# Patient Record
Sex: MALE | Race: BLACK OR AFRICAN AMERICAN | Employment: OTHER | URBAN - METROPOLITAN AREA
[De-identification: names, ages, dates, MRNs, and addresses within clinical notes are randomized per-mention and may not be internally consistent; named-entity substitution may affect disease eponyms.]

---

## 2020-10-30 ENCOUNTER — HOSPITAL ENCOUNTER (EMERGENCY)
Age: 60
Discharge: HOME | End: 2020-10-30
Attending: EMERGENCY MEDICINE

## 2020-10-30 ENCOUNTER — APPOINTMENT (OUTPATIENT)
Dept: CT IMAGING | Age: 60
End: 2020-10-30
Attending: EMERGENCY MEDICINE

## 2020-10-30 VITALS
OXYGEN SATURATION: 98 % | DIASTOLIC BLOOD PRESSURE: 98 MMHG | SYSTOLIC BLOOD PRESSURE: 206 MMHG | BODY MASS INDEX: 35.84 KG/M2 | HEIGHT: 66 IN | WEIGHT: 223 LBS | HEART RATE: 74 BPM | RESPIRATION RATE: 18 BRPM | TEMPERATURE: 98 F

## 2020-10-30 DIAGNOSIS — R10.32 ABDOMINAL PAIN, LLQ (LEFT LOWER QUADRANT): Primary | ICD-10-CM

## 2020-10-30 PROCEDURE — 99283 EMERGENCY DEPT VISIT LOW MDM: CPT

## 2020-10-30 PROCEDURE — 74011250636 HC RX REV CODE- 250/636: Performed by: EMERGENCY MEDICINE

## 2020-10-30 PROCEDURE — 74176 CT ABD & PELVIS W/O CONTRAST: CPT

## 2020-10-30 PROCEDURE — 96374 THER/PROPH/DIAG INJ IV PUSH: CPT

## 2020-10-30 RX ORDER — NAPROXEN 500 MG/1
500 TABLET ORAL
Qty: 20 TAB | Refills: 0 | Status: SHIPPED | OUTPATIENT
Start: 2020-10-30 | End: 2021-07-09

## 2020-10-30 RX ORDER — KETOROLAC TROMETHAMINE 30 MG/ML
30 INJECTION, SOLUTION INTRAMUSCULAR; INTRAVENOUS
Status: COMPLETED | OUTPATIENT
Start: 2020-10-30 | End: 2020-10-30

## 2020-10-30 RX ADMIN — KETOROLAC TROMETHAMINE 30 MG: 30 INJECTION, SOLUTION INTRAMUSCULAR at 23:00

## 2020-10-31 NOTE — ED PROVIDER NOTES
EMERGENCY DEPARTMENT HISTORY AND PHYSICAL EXAM      Date: 10/30/2020  Patient Name: Annmarie Echevarria      History of Presenting Illness     Chief Complaint   Patient presents with    Abdominal Pain       History Provided By: Patient    HPI: Annmarie Echevarria, 61 y.o. male with a past medical history significant Kidney stones presents to the ED with cc of left flank pain that wraps around to the left side of his abdomen. Patient says the pain has been going on for about 24 hours without exacerbating relieving factors. He is treated with over-the-counter remedies without complete relief of his symptoms. He describes the pain is sharp mild to moderate in nature. [facility] and subsequent sent to the emergency room. There are no other complaints, changes, or physical findings at this time. PCP: Luis Romero MD        Past History     Past Medical History:  No past medical history on file. Past Surgical History:  No past surgical history on file. Family History:  No family history on file. Social History:  Social History     Tobacco Use    Smoking status: Current Every Day Smoker    Smokeless tobacco: Never Used   Substance Use Topics    Alcohol use: Not on file    Drug use: Not on file       Allergies:  No Known Allergies      Review of Systems     Review of Systems   Constitutional: Negative. Negative for activity change, appetite change, chills, fatigue, fever and unexpected weight change. HENT: Negative. Negative for congestion, hearing loss, rhinorrhea, sneezing and voice change. Eyes: Negative. Negative for pain and visual disturbance. Respiratory: Negative. Negative for apnea, cough, choking, chest tightness and shortness of breath. Cardiovascular: Negative. Negative for chest pain and palpitations. Gastrointestinal: Positive for abdominal pain.  Negative for abdominal distention, blood in stool, diarrhea, nausea and vomiting. Genitourinary: Positive for flank pain. Negative for difficulty urinating, frequency and urgency. No discharge   Musculoskeletal: Negative for arthralgias, back pain, myalgias and neck stiffness. Skin: Negative. Negative for color change and rash. Allergic/Immunologic: Negative for immunocompromised state. Neurological: Negative. Negative for dizziness, seizures, syncope, speech difficulty, weakness, numbness and headaches. Hematological: Negative for adenopathy. Psychiatric/Behavioral: Negative. Negative for agitation, behavioral problems, dysphoric mood and suicidal ideas. The patient is not nervous/anxious. Physical Exam     Physical Exam  Vitals signs and nursing note reviewed. Constitutional:       General: He is not in acute distress. Appearance: He is well-developed. HENT:      Head: Normocephalic and atraumatic. Nose: Nose normal.      Mouth/Throat:      Mouth: Mucous membranes are moist.      Pharynx: Oropharynx is clear. No oropharyngeal exudate. Eyes:      General:         Right eye: No discharge. Left eye: No discharge. Conjunctiva/sclera: Conjunctivae normal.      Pupils: Pupils are equal, round, and reactive to light. Neck:      Musculoskeletal: Normal range of motion and neck supple. Cardiovascular:      Rate and Rhythm: Normal rate and regular rhythm. Chest Wall: PMI is not displaced. No thrill. Heart sounds: Normal heart sounds. No murmur. No friction rub. No gallop. Pulmonary:      Effort: Pulmonary effort is normal. No respiratory distress. Breath sounds: Normal breath sounds. No wheezing or rales. Chest:      Chest wall: No tenderness. Abdominal:      General: Bowel sounds are normal. There is no distension. Palpations: Abdomen is soft. There is no mass. Tenderness: There is no abdominal tenderness. There is no guarding or rebound. Musculoskeletal: Normal range of motion.    Lymphadenopathy: Cervical: No cervical adenopathy. Skin:     General: Skin is warm and dry. Capillary Refill: Capillary refill takes less than 2 seconds. Findings: No erythema or rash. Neurological:      Mental Status: He is alert and oriented to person, place, and time. Cranial Nerves: No cranial nerve deficit. Coordination: Coordination normal.   Psychiatric:         Mood and Affect: Mood normal.         Behavior: Behavior normal.         Lab and Diagnostic Study Results     Labs -   No results found for this or any previous visit (from the past 12 hour(s)). Radiologic Studies -   [unfilled]  CT Results  (Last 48 hours)               10/30/20 2232  CT ABD PELV WO CONT Final result    Impression:  IMPRESSION: No acute abdominopelvic abnormality. Degenerative changes of the   spine. Neurostimulator device noted. Narrative:  HISTORY:   Abd/flank pain     Dose reduction technique: All CT scans at this facility are performed using dose reduction optimization   technique as appropriate on the exam including the following: Automated exposure   control, adjustment of the MA and/or KV according to patient size of use of   iterative reconstructive technique. .       TECHNIQUE: CT of the abdomen and pelvis without contrast   COMPARISON: None   LIMITATIONS: None       CHEST: No acute airspace process or pleural effusion seen at the lung bases. LIVER: Normal.        GALLBLADDER: Normal.        BILIARY TREE: Normal.           PANCREAS: Normal.            SPLEEN: Normal.           ADRENAL GLANDS: Normal.       KIDNEYS/URETERS/BLADDER: Normal.           RETROPERITONEUM/AORTA: Normal.      BOWEL/MESENTERY: Normal.         APPENDIX: The appendix is not identified with certainty; there are no secondary   changes of inflammation in the right lower quadrant to suggest acute   appendicitis.    PERITONEAL CAVITY: Normal.          REPRODUCTIVE ORGANS: Normal.           BONE/TISSUES: No acute abnormality. Degenerative changes of the spine with facet   arthropathy particularly at L3-S1 levels. Grade 1 anterolisthesis of L4 on L5. Areas of endplate changes and osteophytes throughout the spine also noted. .   Subcutaneous neurostimulator with leads in the posterior thecal space entering   at the T12-L1 interspace       OTHER: None. CXR Results  (Last 48 hours)    None          Medical Decision Making and ED Course   - I am the first and primary provider for this patient. - I reviewed the vital signs, available nursing notes, past medical history, past surgical history, family history and social history. Initial assessment performed. The patients presenting problems have been discussed, and they are in agreement with the care plan formulated and outlined with them. I have encouraged them to ask questions as they arise throughout their visit. Vital Signs-Reviewed the patient's vital signs. Patient Vitals for the past 12 hrs:   Temp Pulse Resp BP SpO2   10/30/20 2126 98 °F (36.7 °C) 72 18 (!) 192/100 100 %     Records Reviewed: Nursing Notes    The patient presents with abdominal pain with a differential diagnosis of abdominal pain, diverticulitis, gastroenteritis and renal Colic    ED Course:       ED Course as of Oct 30 2306   Fri Oct 30, 2020   2203 Reviewed the patient's labs from outside facility. Will treat with analgesic IV fluid and CT scan of the abdomen for diagnostic purposes. [CS]   4396 His pain is improved. [CS]      ED Course User Index  [CS] Toby Bazzi MD         Provider Notes (Medical Decision Making):   Patient is a 80-year-old male comes in with some left-sided abdominal pain that is not palpable on exam and has a negative CT. I have reviewed the labs from outside facility which are none remarkable for the patient being toxic at this point time. Will treat with analgesic, follow-up with his PCP.   Is noted that he did not have blood in any of his urine. [facility]           Consultations:       Consultations: -   NONE      Procedures and [address] Michelle  Procedures         HYPERTENSION COUNSELING: Education was provided to the patient today regarding their hypertension. Patient is made aware of their elevated blood pressure and is instructed to follow up this week with their Primary Care for a recheck. Patient is counseled regarding consequences of chronic, uncontrolled hypertension including kidney disease, heart disease, stroke or even death. Patient states their understanding and agrees to follow up this week. Additionally, during their visit, I discussed sodium restriction, maintaining ideal body weight and regular exercise program as physiologic means to achieve blood pressure control. The patient will strive towards this. Disposition     Disposition: Home Nonsteroidals  Condition improved        Remove if not discharged  DISCHARGE PLAN:  1. There are no discharge medications for this patient. 2.   Follow-up Information     Follow up With Specialties Details Why Contact Info    Francisco Gutierrez MD Family Medicine Call in 2 days  [address] [facility]. [address] [place] [address]  [phone]          3. Return to ED if worse   4. Current Discharge Medication List      START taking these medications    Details   naproxen (NAPROSYN) 500 mg tablet Take 1 Tab by mouth every twelve (12) hours as needed for Pain. Qty: 20 Tab, Refills: 0             Diagnosis     Clinical Impression:   1. Abdominal pain, LLQ (left lower quadrant)        Attestations:    Berna Arango MD    Please note that this dictation was completed with [employer], the computer voice recognition software. Quite often unanticipated grammatical, syntax, homophones, and other interpretive errors are inadvertently transcribed by the computer software. Please disregard these errors. Please excuse any errors that have escaped final proofreading. Thank you.

## 2021-07-09 ENCOUNTER — HOSPITAL ENCOUNTER (EMERGENCY)
Age: 61
Discharge: HOME | End: 2021-07-09
Attending: FAMILY MEDICINE
Payer: COMMERCIAL

## 2021-07-09 VITALS
WEIGHT: 233 LBS | BODY MASS INDEX: 36.57 KG/M2 | TEMPERATURE: 98.8 F | HEART RATE: 100 BPM | RESPIRATION RATE: 18 BRPM | HEIGHT: 67 IN | DIASTOLIC BLOOD PRESSURE: 87 MMHG | SYSTOLIC BLOOD PRESSURE: 160 MMHG | OXYGEN SATURATION: 99 %

## 2021-07-09 DIAGNOSIS — L03.114 CELLULITIS OF LEFT HAND: Primary | ICD-10-CM

## 2021-07-09 PROCEDURE — 99282 EMERGENCY DEPT VISIT SF MDM: CPT

## 2021-07-09 RX ORDER — INDOMETHACIN 50 MG/1
50 CAPSULE ORAL 3 TIMES DAILY
Qty: 30 CAPSULE | Refills: 0 | Status: SHIPPED | OUTPATIENT
Start: 2021-07-09 | End: 2021-07-19

## 2021-07-09 RX ORDER — CEPHALEXIN 500 MG/1
500 CAPSULE ORAL 3 TIMES DAILY
Qty: 21 CAPSULE | Refills: 0 | Status: SHIPPED | OUTPATIENT
Start: 2021-07-09 | End: 2021-07-16

## 2021-07-09 RX ORDER — OXYCODONE AND ACETAMINOPHEN 5; 325 MG/1; MG/1
1 TABLET ORAL
Qty: 12 TABLET | Refills: 0 | Status: SHIPPED | OUTPATIENT
Start: 2021-07-09 | End: 2021-07-12

## 2021-07-09 RX ORDER — AMLODIPINE BESYLATE 5 MG/1
5 TABLET ORAL DAILY
COMMUNITY
Start: 2021-06-16 | End: 2021-11-30

## 2021-07-09 RX ORDER — METFORMIN HYDROCHLORIDE 500 MG/1
500 TABLET ORAL 2 TIMES DAILY
COMMUNITY
Start: 2021-06-17 | End: 2021-11-30

## 2021-07-09 NOTE — ED TRIAGE NOTES
Pt states he was working outside a couple of days ago, states he thinks he got bitten by something on the top of his left hand it has been swelling since. Pt c/o stinging on the top of his hand and tightness.

## 2021-07-09 NOTE — ED PROVIDER NOTES
EMERGENCY DEPARTMENT HISTORY AND PHYSICAL EXAM      Date: 7/9/2021  Patient Name: Gagan Kamara    History of Presenting Illness     Chief Complaint   Patient presents with    Hand Swelling       History Provided By: Patient    HPI: Gagan Kamara, 61 y.o. male with a past medical history significant diabetes and hypertension presents to the ED with cc of hand and wrist pain. Patient states this started Wednesday night after working on a shed. He thinks an insect bit him. Over the past 24 hours is gotten more red, warm, swollen, and painful. He has been taken over-the-counter meds without relief. He says the pain to 10 out of 10. There are no other complaints, changes, or physical findings at this time. PCP: Sophie Barry MD    No current facility-administered medications on file prior to encounter. Current Outpatient Medications on File Prior to Encounter   Medication Sig Dispense Refill    amLODIPine (NORVASC) 5 mg tablet Take 5 mg by mouth daily.  metFORMIN (GLUCOPHAGE) 500 mg tablet Take 500 mg by mouth two (2) times a day.  [DISCONTINUED] naproxen (NAPROSYN) 500 mg tablet Take 1 Tab by mouth every twelve (12) hours as needed for Pain. 20 Tab 0       Past History     Past Medical History:  Past Medical History:   Diagnosis Date    Diabetes ([facility] [address]     Hypertension        Past Surgical History:  History reviewed. No pertinent surgical history. Family History:  History reviewed. No pertinent family history. Social History:  Social History     Tobacco Use    Smoking status: Current Every Day Smoker     Packs/day: 0.25    Smokeless tobacco: Never Used   Substance Use Topics    Alcohol use: Not on file    Drug use: Not on file       Allergies:  No Known Allergies      Review of Systems     Review of Systems   Constitutional: Negative for fatigue and fever. HENT: Negative for rhinorrhea and sore throat. Respiratory: Negative for cough and shortness of breath. Cardiovascular: Negative for chest pain and palpitations. Gastrointestinal: Negative for abdominal pain, diarrhea, nausea and vomiting. Genitourinary: Negative for difficulty urinating and dysuria. Musculoskeletal: Positive for joint swelling. Negative for arthralgias and myalgias. Skin: Negative for color change and rash. Neurological: Negative for light-headedness and headaches. Physical Exam     Physical Exam  Vitals and nursing note reviewed. Constitutional:       General: He is awake. He is not in acute distress. Appearance: Normal appearance. He is well-developed and normal weight. He is not ill-appearing, toxic-appearing or diaphoretic. Interventions: Face mask in place. HENT:      Head: Normocephalic and atraumatic. Eyes:      Conjunctiva/sclera: Conjunctivae normal.      Pupils: Pupils are equal, round, and reactive to light. Cardiovascular:      Rate and Rhythm: Normal rate and regular rhythm. Pulses: Normal pulses. Heart sounds: Normal heart sounds. Pulmonary:      Effort: Pulmonary effort is normal.      Breath sounds: Normal breath sounds. Abdominal:      General: Abdomen is flat. Palpations: Abdomen is soft. Tenderness: There is no abdominal tenderness. Musculoskeletal:      Cervical back: Normal range of motion and neck supple. Comments: Left hand is red warm and swollen. He has some tenderness in the wrist as well. He has some decreased range of motion due to pain. Skin:     General: Skin is warm and dry. Neurological:      Mental Status: He is alert and oriented to person, place, and time. GCS: GCS eye subscore is 4. GCS verbal subscore is 5. GCS motor subscore is 6. Psychiatric:         Mood and Affect: Mood and affect normal.         Behavior: Behavior normal. Behavior is cooperative. Thought Content:  Thought content normal.         Lab and Diagnostic Study Results     Labs -   No results found for this or any previous visit (from the past 12 hour(s)). Radiologic Studies -   @lastxrresult@  CT Results  (Last 48 hours)    None        CXR Results  (Last 48 hours)    None            Medical Decision Making   - I am the first provider for this patient. - I reviewed the vital signs, available nursing notes, past medical history, past surgical history, family history and social history. - Initial assessment performed. The patients presenting problems have been discussed, and they are in agreement with the care plan formulated and outlined with them. I have encouraged them to ask questions as they arise throughout their visit. Vital Signs-Reviewed the patient's vital signs. Patient Vitals for the past 12 hrs:   Temp Pulse Resp BP SpO2   07/09/21 1527 98.8 °F (37.1 °C) 100 18 (!) 160/87 99 %       Records Reviewed: Nursing Notes          ED Course:          Provider Notes (Medical Decision Making): MDM       Procedures   Medical Decision Makingedical Decision Making  Performed by: Jaylin Arroyo MD  PROCEDURES:  Procedures       Disposition   Disposition:    Discharged    DISCHARGE PLAN:  1. Current Discharge Medication List      START taking these medications    Details   cephALEXin (Keflex) 500 mg capsule Take 1 Capsule by mouth three (3) times daily for 7 days. Qty: 21 Capsule, Refills: 0      indomethacin (INDOCIN) 50 mg capsule Take 1 Capsule by mouth three (3) times daily for 10 days. With food  Qty: 30 Capsule, Refills: 0      oxyCODONE-acetaminophen (Percocet) 5-325 mg per tablet Take 1 Tablet by mouth every six (6) hours as needed for Pain for up to 3 days. Max Daily Amount: 4 Tablets. Qty: 12 Tablet, Refills: 0    Associated Diagnoses: Cellulitis of left hand         CONTINUE these medications which have NOT CHANGED    Details   amLODIPine (NORVASC) 5 mg tablet Take 5 mg by mouth daily. metFORMIN (GLUCOPHAGE) 500 mg tablet Take 500 mg by mouth two (2) times a day.            2.   Follow-up Information    None       3. Return to ED if worse   4. Current Discharge Medication List      START taking these medications    Details   cephALEXin (Keflex) 500 mg capsule Take 1 Capsule by mouth three (3) times daily for 7 days. Qty: 21 Capsule, Refills: 0  Start date: 7/9/2021, End date: 7/16/2021      indomethacin (INDOCIN) 50 mg capsule Take 1 Capsule by mouth three (3) times daily for 10 days. With food  Qty: 30 Capsule, Refills: 0  Start date: 7/9/2021, End date: 7/19/2021      oxyCODONE-acetaminophen (Percocet) 5-325 mg per tablet Take 1 Tablet by mouth every six (6) hours as needed for Pain for up to 3 days. Max Daily Amount: 4 Tablets. Qty: 12 Tablet, Refills: 0  Start date: 7/9/2021, End date: 7/12/2021    Associated Diagnoses: Cellulitis of left hand               Diagnosis     Clinical Impression:   1. Cellulitis of left hand        Attestations:    Forest Ferreira MD    Please note that this dictation was completed with [employer], the [employer] voice recognition software. Quite often unanticipated grammatical, syntax, homophones, and other interpretive errors are inadvertently transcribed by the computer software. Please disregard these errors. Please excuse any errors that have escaped final proofreading. Thank you.

## 2021-11-30 ENCOUNTER — HOSPITAL ENCOUNTER (EMERGENCY)
Age: 61
Discharge: HOME | End: 2021-11-30
Attending: EMERGENCY MEDICINE

## 2021-11-30 ENCOUNTER — APPOINTMENT (OUTPATIENT)
Dept: CT IMAGING | Age: 61
End: 2021-11-30
Attending: EMERGENCY MEDICINE

## 2021-11-30 VITALS
SYSTOLIC BLOOD PRESSURE: 175 MMHG | HEIGHT: 67 IN | DIASTOLIC BLOOD PRESSURE: 87 MMHG | BODY MASS INDEX: 36.57 KG/M2 | TEMPERATURE: 98.5 F | WEIGHT: 233 LBS | OXYGEN SATURATION: 96 % | RESPIRATION RATE: 18 BRPM | HEART RATE: 72 BPM

## 2021-11-30 DIAGNOSIS — G89.29 CHRONIC ABDOMINAL PAIN: Primary | ICD-10-CM

## 2021-11-30 DIAGNOSIS — R10.9 CHRONIC ABDOMINAL PAIN: Primary | ICD-10-CM

## 2021-11-30 DIAGNOSIS — I10 HYPERTENSION, UNSPECIFIED TYPE: ICD-10-CM

## 2021-11-30 DIAGNOSIS — R73.9 HYPERGLYCEMIA: ICD-10-CM

## 2021-11-30 DIAGNOSIS — D17.5 LIPOMA OF COLON: ICD-10-CM

## 2021-11-30 LAB
ALBUMIN SERPL-MCNC: 3.9 G/DL (ref 3.5–4.7)
ALBUMIN/GLOB SERPL: 0.9 {RATIO}
ALP SERPL-CCNC: 95 U/L (ref 38–126)
ALT SERPL-CCNC: 16 U/L (ref 3–72)
ANION GAP SERPL CALC-SCNC: 8 MMOL/L
APPEARANCE UR: CLEAR
AST SERPL W P-5'-P-CCNC: 21 U/L (ref 17–74)
BACTERIA URNS QL MICRO: ABNORMAL /HPF
BASOPHILS # BLD: 0.1 K/UL (ref 0–0.1)
BASOPHILS NFR BLD: 1 % (ref 0–2)
BILIRUB SERPL-MCNC: 0.2 MG/DL (ref 0.2–1)
BILIRUB UR QL: NEGATIVE
BUN SERPL-MCNC: 12 MG/DL (ref 9–21)
BUN/CREAT SERPL: 17
CA-I BLD-MCNC: 9.6 MG/DL (ref 8.5–10.5)
CHLORIDE SERPL-SCNC: 100 MMOL/L (ref 94–111)
CO2 SERPL-SCNC: 28 MMOL/L (ref 21–33)
COLOR UR: YELLOW
CREAT SERPL-MCNC: 0.7 MG/DL (ref 0.8–1.5)
DIFFERENTIAL METHOD BLD: ABNORMAL
EOSINOPHIL # BLD: 0.1 K/UL (ref 0–0.4)
EOSINOPHIL NFR BLD: 1 % (ref 0–5)
EPITH CASTS URNS QL MICRO: ABNORMAL /LPF (ref 0–20)
ERYTHROCYTE [DISTWIDTH] IN BLOOD BY AUTOMATED COUNT: 15.2 % (ref 11.6–14.5)
GLOBULIN SER CALC-MCNC: 4.3 G/DL
GLUCOSE SERPL-MCNC: 178 MG/DL (ref 70–110)
GLUCOSE UR STRIP.AUTO-MCNC: NEGATIVE MG/DL
HCT VFR BLD AUTO: 46.1 % (ref 36–48)
HGB BLD-MCNC: 14.3 G/DL (ref 13–16)
HGB UR QL STRIP: NEGATIVE
IMM GRANULOCYTES # BLD AUTO: 0 K/UL (ref 0–0.04)
IMM GRANULOCYTES NFR BLD AUTO: 0 % (ref 0–0.5)
KETONES UR QL STRIP.AUTO: NEGATIVE MG/DL
LEUKOCYTE ESTERASE UR QL STRIP.AUTO: NEGATIVE
LIPASE SERPL-CCNC: 31 U/L (ref 10–57)
LYMPHOCYTES # BLD: 2.9 K/UL (ref 0.9–3.6)
LYMPHOCYTES NFR BLD: 28 % (ref 21–52)
MCH RBC QN AUTO: 26.8 PG (ref 24–34)
MCHC RBC AUTO-ENTMCNC: 31 G/DL (ref 31–37)
MCV RBC AUTO: 86.3 FL (ref 78–100)
MONOCYTES # BLD: 0.9 K/UL (ref 0.05–1.2)
MONOCYTES NFR BLD: 9 % (ref 3–10)
MUCOUS THREADS URNS QL MICRO: ABNORMAL /LPF
NEUTS SEG # BLD: 6.6 K/UL (ref 1.8–8)
NEUTS SEG NFR BLD: 61 % (ref 40–73)
NITRITE UR QL STRIP.AUTO: NEGATIVE
NRBC # BLD: 0 K/UL (ref 0–0.01)
NRBC BLD-RTO: 0 PER 100 WBC
PH UR STRIP: 6 [PH] (ref 5–8)
PLATELET # BLD AUTO: 365 K/UL (ref 135–420)
PMV BLD AUTO: 10.1 FL (ref 9.2–11.8)
POTASSIUM SERPL-SCNC: 3.5 MMOL/L (ref 3.2–5.1)
PROT SERPL-MCNC: 8.2 G/DL (ref 6.1–8.4)
PROT UR STRIP-MCNC: 300 MG/DL
RBC # BLD AUTO: 5.34 M/UL (ref 4.35–5.65)
RBC #/AREA URNS HPF: ABNORMAL /HPF (ref 0–2)
SODIUM SERPL-SCNC: 136 MMOL/L (ref 135–145)
SP GR UR REFRACTOMETRY: 1.02 (ref 1–1.03)
UROBILINOGEN UR QL STRIP.AUTO: 1 EU/DL (ref 0.2–1)
WBC # BLD AUTO: 10.5 K/UL (ref 4.6–13.2)
WBC URNS QL MICRO: ABNORMAL /HPF (ref 0–4)

## 2021-11-30 PROCEDURE — 80053 COMPREHEN METABOLIC PANEL: CPT

## 2021-11-30 PROCEDURE — 74011250636 HC RX REV CODE- 250/636: Performed by: EMERGENCY MEDICINE

## 2021-11-30 PROCEDURE — 85025 COMPLETE CBC W/AUTO DIFF WBC: CPT

## 2021-11-30 PROCEDURE — 81001 URINALYSIS AUTO W/SCOPE: CPT

## 2021-11-30 PROCEDURE — 74011000636 HC RX REV CODE- 636: Performed by: EMERGENCY MEDICINE

## 2021-11-30 PROCEDURE — 96375 TX/PRO/DX INJ NEW DRUG ADDON: CPT

## 2021-11-30 PROCEDURE — 96374 THER/PROPH/DIAG INJ IV PUSH: CPT

## 2021-11-30 PROCEDURE — 83690 ASSAY OF LIPASE: CPT

## 2021-11-30 PROCEDURE — 99283 EMERGENCY DEPT VISIT LOW MDM: CPT

## 2021-11-30 PROCEDURE — 74177 CT ABD & PELVIS W/CONTRAST: CPT

## 2021-11-30 RX ORDER — AMLODIPINE BESYLATE 10 MG/1
10 TABLET ORAL DAILY
Qty: 15 TABLET | Refills: 0 | Status: SHIPPED | OUTPATIENT
Start: 2021-11-30

## 2021-11-30 RX ORDER — KETOROLAC TROMETHAMINE 30 MG/ML
30 INJECTION, SOLUTION INTRAMUSCULAR; INTRAVENOUS
Status: COMPLETED | OUTPATIENT
Start: 2021-11-30 | End: 2021-11-30

## 2021-11-30 RX ORDER — LABETALOL HCL 20 MG/4 ML
20 SYRINGE (ML) INTRAVENOUS
Status: DISCONTINUED | OUTPATIENT
Start: 2021-11-30 | End: 2021-11-30 | Stop reason: HOSPADM

## 2021-11-30 RX ORDER — ONDANSETRON 2 MG/ML
4 INJECTION INTRAMUSCULAR; INTRAVENOUS
Status: COMPLETED | OUTPATIENT
Start: 2021-11-30 | End: 2021-11-30

## 2021-11-30 RX ORDER — SODIUM CHLORIDE 0.9 % (FLUSH) 0.9 %
5-10 SYRINGE (ML) INJECTION
Status: COMPLETED | OUTPATIENT
Start: 2021-11-30 | End: 2021-11-30

## 2021-11-30 RX ORDER — OMEPRAZOLE 40 MG/1
40 CAPSULE, DELAYED RELEASE ORAL DAILY
Qty: 20 CAPSULE | Refills: 0 | Status: ON HOLD | OUTPATIENT
Start: 2021-11-30 | End: 2022-10-25

## 2021-11-30 RX ORDER — ONDANSETRON 4 MG/1
4 TABLET, ORALLY DISINTEGRATING ORAL
Qty: 10 TABLET | Refills: 0 | Status: ON HOLD | OUTPATIENT
Start: 2021-11-30 | End: 2022-10-25

## 2021-11-30 RX ADMIN — IOPAMIDOL 100 ML: 755 INJECTION, SOLUTION INTRAVENOUS at 13:45

## 2021-11-30 RX ADMIN — SODIUM CHLORIDE 1000 ML: 9 INJECTION, SOLUTION INTRAVENOUS at 12:43

## 2021-11-30 RX ADMIN — Medication 10 ML: at 13:35

## 2021-11-30 RX ADMIN — ONDANSETRON 4 MG: 2 INJECTION INTRAMUSCULAR; INTRAVENOUS at 12:43

## 2021-11-30 RX ADMIN — KETOROLAC TROMETHAMINE 30 MG: 30 INJECTION, SOLUTION INTRAMUSCULAR at 12:43

## 2021-11-30 NOTE — ED NOTES
Pt states that he has taken himself off of his BP and his type 2 DM medications . His PCP is not aware - states that he has not been eating salt or drinking soda.

## 2021-12-08 NOTE — ED PROVIDER NOTES
EMERGENCY DEPARTMENT HISTORY AND PHYSICAL EXAM      Date: 11/30/2021  Patient Name: Akbar Royal      History of Presenting Illness     Chief Complaint   Patient presents with    Abdominal Pain       History Provided By: Patient    HPI: Akbar Royal, 64 y.o. male with a past medical history significant diabetes and hypertension presents to the ED with cc of abdomen pain. Patient states began about 5 days ago. Is mostly in the left upper quadrant and radiates to the back and suprapubic area. The pain is intermittent, is severe when present, it is sharp, he rates it a 9 out of 10. Pain also associated with painful urination with dark urine. He has no nausea, vomiting or diarrhea. He denies constipation. He states that he has had bouts of this pain in the past, was given no specific diagnosis. He denies alcohol but smokes. [place] any drugs. He admits he has been referred to PCP and also GI but has been unable to see either. He denies any history of pancreatitis, kidney stones, stones, or any surgery to his abdomen. There are no other complaints, changes, or physical findings at this time. PCP: Bro Rodriguez MD    Current Outpatient Medications   Medication Sig Dispense Refill    ondansetron (Zofran ODT) 4 mg disintegrating tablet 1 Tablet by SubLINGual route every eight (8) hours as needed for Nausea or Vomiting. 10 Tablet 0    amLODIPine (NORVASC) 10 mg tablet Take 1 Tablet by mouth daily. 15 Tablet 0    omeprazole (PRILOSEC) 40 mg capsule Take 1 Capsule by mouth daily. 20 Capsule 0       Past History     Past Medical History:  Past Medical History:   Diagnosis Date    Diabetes ([address]     Hypertension        Past Surgical History:  No past surgical history on file. Family History:  History reviewed. No pertinent family history.     Social History:  Social History     Tobacco Use    Smoking status: Current Every Day Smoker     Packs/day: 0.25    Smokeless tobacco: Never Used Substance Use Topics    Alcohol use: Not on file    Drug use: Not on file       Allergies:  No Known Allergies      Review of Systems     Review of Systems   Constitutional: Negative for chills and fever. HENT: Negative for congestion and sore throat. Respiratory: Negative for cough and shortness of breath. Cardiovascular: Negative for chest pain and palpitations. Gastrointestinal: Positive for abdominal pain. Negative for nausea and vomiting. Genitourinary: Positive for dysuria. Negative for flank pain and frequency. Musculoskeletal: Negative for arthralgias, joint swelling and myalgias. Skin: Negative for color change and wound. Neurological: Negative for dizziness, weakness, light-headedness and headaches. Hematological: Negative for adenopathy. Physical Exam     Physical Exam  Vitals and nursing note reviewed. Constitutional:       General: He is not in acute distress. Appearance: He is well-developed. He is obese. He is not diaphoretic. Comments: He appears in no acute distress. HENT:      Head: Normocephalic and atraumatic. No right periorbital erythema or left periorbital erythema. Jaw: No trismus. Right Ear: External ear normal. No drainage or swelling. Tympanic membrane is not perforated, erythematous or bulging. Left Ear: External ear normal. No drainage or swelling. Tympanic membrane is not perforated, erythematous or bulging. Nose: Nose normal. No mucosal edema or rhinorrhea. Right Sinus: No maxillary sinus tenderness or frontal sinus tenderness. Left Sinus: No maxillary sinus tenderness or frontal sinus tenderness. Mouth/Throat:      Mouth: No oral lesions. Dentition: No dental abscesses. Pharynx: Uvula midline. No oropharyngeal exudate, posterior oropharyngeal erythema or uvula swelling. Tonsils: No tonsillar abscesses. Eyes:      General: No scleral icterus. Right eye: No discharge.          Left eye: No discharge. Conjunctiva/sclera: Conjunctivae normal.   Cardiovascular:      Rate and Rhythm: Normal rate and regular rhythm. Heart sounds: Normal heart sounds. No murmur heard. No friction rub. No gallop. Pulmonary:      Effort: Pulmonary effort is normal. No tachypnea, accessory muscle usage or respiratory distress. Breath sounds: Normal breath sounds. No decreased breath sounds, wheezing, rhonchi or rales. Abdominal:      General: Bowel sounds are normal. There is no distension. Palpations: Abdomen is soft. Tenderness: There is no abdominal tenderness. Comments: Abdomen is obese, soft, there is tenderness worse in the left upper quadrant. There is no guarding or rebound. Musculoskeletal:         General: No tenderness. Normal range of motion. Cervical back: Normal range of motion and neck supple. Lymphadenopathy:      Cervical: No cervical adenopathy. Skin:     General: Skin is warm and dry. Neurological:      Mental Status: He is alert and oriented to person, place, and time. Psychiatric:         Judgment: Judgment normal.         Lab and Diagnostic Study Results     Labs -   No results found for this or any previous visit (from the past 12 hour(s)). Radiologic Studies -   [unfilled]  CT Results  (Last 48 hours)    None        CXR Results  (Last 48 hours)    None          Medical Decision Making and ED Course   - I am the first and primary provider for this patient AND AM THE PRIMARY PROVIDER OF RECORD. - I reviewed the vital signs, available nursing notes, past medical history, past surgical history, family history and social history. - Initial assessment performed. The patients presenting problems have been discussed, and the staff are in agreement with the care plan formulated and outlined with them. I have encouraged them to ask questions as they arise throughout their visit. Vital Signs-Reviewed the patient's vital signs.     No data found. Records Reviewed: Nursing Notes, Old Medical Records, Previous Radiology Studies and Previous Laboratory Studies    The patient presents with abdominal pain with a differential diagnosis of AAA, appendicitis, biliary colic, cholecystitis, diverticulitis, GERD, pancreatitis, PUD, renal Colic and UTI    ED Course:   19-year-old male scribing left upper quadrant pain which he has had on and off for several months. Was seen here about a year ago with left lower quadrant pain and evaluation at that time was unremarkable. He was then seen at an outside facility describing approximately 8 months of similar pain. Valuation again at that time was unremarkable. He was diagnosed with hypoglycemia in the ED and put on Metformin but he has been unable to follow-up with PCP. He presents today with similar pain, he has no fever, again CT with no acute findings except for a lipoma  ascending  Colon. Do not think this is related to this pain. He is hypoglycemic again and also hypertensive. His pain resolved in ED. He is describing difficulties in finding a physician. Referred him to our case management, not sure if he can be helped. Also gave him a local PCP referral and GI. Also refilled his BP medications. Since pain is chronic, may need some scoping, also his dysuria seems chronic certainly needs prostate checked. Provider Notes (Medical Decision Making):               Consultations:       Consultations:         Procedures and Critical Care             HYPERTENSION COUNSELING: Education was provided to the patient today regarding their hypertension. Patient is made aware of their elevated blood pressure and is instructed to follow up this week with their Primary Care for a recheck. Patient is counseled regarding consequences of chronic, uncontrolled hypertension including kidney disease, heart disease, stroke or even death. Patient states their understanding and agrees to follow up this week. Additionally, during their visit, I discussed sodium restriction, maintaining ideal body weight and regular exercise program as physiologic means to achieve blood pressure control. The patient will strive towards this. CRITICAL CARE NOTE :  5:13 PM      Disposition     Disposition: Condition stable and improved  DC- Adult Discharges: All of the diagnostic tests were reviewed and questions answered. Diagnosis, care plan and treatment options were discussed. The patient understands the instructions and will follow up as directed. The patients results have been reviewed with them. They have been counseled regarding their diagnosis. The patient verbally convey understanding and agreement of the signs, symptoms, diagnosis, treatment and prognosis and additionally agrees to follow up as recommended with their PCP in 24 - 48 hours. They also agree with the care-plan and convey that all of their questions have been answered. I have also put together some discharge instructions for them that include: 1) educational information regarding their diagnosis, 2) how to care for their diagnosis at home, as well a 3) list of reasons why they would want to return to the ED prior to their follow-up appointment, should their condition change. Diagnosis:   1. Chronic abdominal pain    2. Lipoma of colon    3. Hyperglycemia    4. Hypertension, unspecified type          Disposition:     Follow-up Information     Follow up With Specialties Details Why Contact Robin Fink, [address]   [address] [facility] [address]  [address] [place] [address]  [phone]            Discharge Medication List as of 11/30/2021  4:32 PM      START taking these medications    Details   ondansetron (Zofran ODT) 4 mg disintegrating tablet 1 Tablet by SubLINGual route every eight (8) hours as needed for Nausea or Vomiting., Normal, Disp-10 Tablet, R-0      amLODIPine (NORVASC) 10 mg tablet Take 1 Tablet by mouth daily. , Normal, Disp-15 Tablet, R-0      omeprazole (PRILOSEC) 40 mg capsule Take 1 Capsule by mouth daily. , Normal, Disp-20 Capsule, R-0             Remove if not discharged  DISCHARGE PLAN:  1. Cannot display discharge medications since this patient is not currently admitted. 2.   Follow-up Information     Follow up With Specialties Details Why Contact Robin Hill, [address]   [address]  [address] [address]  [phone]          3. Return to ED if worse   4. Discharge Medication List as of 11/30/2021  4:32 PM      START taking these medications    Details   ondansetron (Zofran ODT) 4 mg disintegrating tablet 1 Tablet by SubLINGual route every eight (8) hours as needed for Nausea or Vomiting., Normal, Disp-10 Tablet, R-0      amLODIPine (NORVASC) 10 mg tablet Take 1 Tablet by mouth daily. , Normal, Disp-15 Tablet, R-0      omeprazole (PRILOSEC) 40 mg capsule Take 1 Capsule by mouth daily. , Normal, Disp-20 Capsule, R-0             Diagnosis     Clinical Impression:   1. Chronic abdominal pain    2. Lipoma of colon    3. Hyperglycemia    4. Hypertension, unspecified type        Attestations:    Kaz Harris MD    Please note that this dictation was completed with [employer], the computer voice recognition software. Quite often unanticipated grammatical, syntax, homophones, and other interpretive errors are inadvertently transcribed by the computer software. Please disregard these errors. Please excuse any errors that have escaped final proofreading. Thank you.

## 2022-10-24 ENCOUNTER — HOSPITAL ENCOUNTER (INPATIENT)
Age: 62
LOS: 5 days | Discharge: ANOTHER HOSPITAL | DRG: 871 | End: 2022-10-29
Attending: EMERGENCY MEDICINE | Admitting: INTERNAL MEDICINE
Payer: MEDICARE

## 2022-10-24 ENCOUNTER — APPOINTMENT (OUTPATIENT)
Dept: CT IMAGING | Age: 62
DRG: 871 | End: 2022-10-24
Attending: EMERGENCY MEDICINE
Payer: MEDICARE

## 2022-10-24 ENCOUNTER — APPOINTMENT (OUTPATIENT)
Dept: GENERAL RADIOLOGY | Age: 62
DRG: 871 | End: 2022-10-24
Attending: EMERGENCY MEDICINE
Payer: MEDICARE

## 2022-10-24 DIAGNOSIS — J96.01 SEPSIS WITH ACUTE HYPOXIC RESPIRATORY FAILURE WITHOUT SEPTIC SHOCK, DUE TO UNSPECIFIED ORGANISM (HCC): ICD-10-CM

## 2022-10-24 DIAGNOSIS — J18.9 PNEUMONIA OF BOTH LUNGS DUE TO INFECTIOUS ORGANISM, UNSPECIFIED PART OF LUNG: Primary | ICD-10-CM

## 2022-10-24 DIAGNOSIS — I16.0 HYPERTENSIVE URGENCY: ICD-10-CM

## 2022-10-24 DIAGNOSIS — J96.01 ACUTE RESPIRATORY FAILURE WITH HYPOXIA (HCC): ICD-10-CM

## 2022-10-24 DIAGNOSIS — R65.20 SEPSIS WITH ACUTE HYPOXIC RESPIRATORY FAILURE WITHOUT SEPTIC SHOCK, DUE TO UNSPECIFIED ORGANISM (HCC): ICD-10-CM

## 2022-10-24 DIAGNOSIS — A41.9 SEPSIS WITH ACUTE HYPOXIC RESPIRATORY FAILURE WITHOUT SEPTIC SHOCK, DUE TO UNSPECIFIED ORGANISM (HCC): ICD-10-CM

## 2022-10-24 PROBLEM — I10 PRIMARY HYPERTENSION: Status: ACTIVE | Noted: 2022-10-24

## 2022-10-24 PROBLEM — E87.6 HYPOKALEMIA: Status: ACTIVE | Noted: 2022-10-24

## 2022-10-24 PROBLEM — J96.90 RESPIRATORY FAILURE (HCC): Status: ACTIVE | Noted: 2022-10-24

## 2022-10-24 PROBLEM — J96.90 RESPIRATORY FAILURE (HCC): Status: RESOLVED | Noted: 2022-10-24 | Resolved: 2022-10-24

## 2022-10-24 LAB
ALBUMIN SERPL-MCNC: 2.3 G/DL (ref 3.4–5)
ALBUMIN/GLOB SERPL: 0.5 {RATIO} (ref 0.8–1.7)
ALP SERPL-CCNC: 141 U/L (ref 45–117)
ALT SERPL-CCNC: 34 U/L (ref 16–61)
ANION GAP SERPL CALC-SCNC: 9 MMOL/L (ref 3–18)
APPEARANCE UR: CLEAR
ARTERIAL PATENCY WRIST A: YES
AST SERPL W P-5'-P-CCNC: 46 U/L (ref 10–38)
BASE EXCESS BLDA CALC-SCNC: 5.5 MMOL/L (ref 0–3)
BASOPHILS # BLD: 0 K/UL (ref 0–0.1)
BASOPHILS NFR BLD: 0 % (ref 0–2)
BDY SITE: ABNORMAL
BILIRUB SERPL-MCNC: 0.6 MG/DL (ref 0.2–1)
BILIRUB UR QL: NEGATIVE
BREATHS.SPONTANEOUS ON VENT: 18
BUN SERPL-MCNC: 8 MG/DL (ref 7–18)
BUN/CREAT SERPL: 9 (ref 12–20)
CA-I BLD-MCNC: 8.8 MG/DL (ref 8.5–10.1)
CHLORIDE SERPL-SCNC: 100 MMOL/L (ref 100–111)
CO2 SERPL-SCNC: 28 MMOL/L (ref 21–32)
COHGB MFR BLD: 0.9 % (ref 1–2)
COLOR UR: YELLOW
COVID-19 RAPID TEST, COVR: NOT DETECTED
CREAT SERPL-MCNC: 0.86 MG/DL (ref 0.6–1.3)
D DIMER PPP FEU-MCNC: 4.49 UG/ML(FEU)
DIFFERENTIAL METHOD BLD: ABNORMAL
EOSINOPHIL # BLD: 0 K/UL (ref 0–0.4)
EOSINOPHIL NFR BLD: 0 % (ref 0–5)
ERYTHROCYTE [DISTWIDTH] IN BLOOD BY AUTOMATED COUNT: 14.9 % (ref 11.6–14.5)
ETHANOL SERPL-MCNC: <3 MG/DL (ref 0–3)
FIO2 ON VENT: 21 %
FLUAV AG NPH QL IA: NEGATIVE
FLUBV AG NOSE QL IA: NEGATIVE
GLOBULIN SER CALC-MCNC: 4.7 G/DL (ref 2–4)
GLUCOSE BLD STRIP.AUTO-MCNC: 188 MG/DL (ref 70–110)
GLUCOSE SERPL-MCNC: 234 MG/DL (ref 74–99)
GLUCOSE UR STRIP.AUTO-MCNC: 100 MG/DL
HCO3 BLDA-SCNC: 30 MMOL/L (ref 22–26)
HCT VFR BLD AUTO: 34.8 % (ref 36–48)
HGB BLD-MCNC: 11 G/DL (ref 13–16)
HGB UR QL STRIP: ABNORMAL
IMM GRANULOCYTES # BLD AUTO: 0 K/UL
IMM GRANULOCYTES NFR BLD AUTO: 0 %
KETONES UR QL STRIP.AUTO: NEGATIVE MG/DL
LACTATE SERPL-SCNC: 1.6 MMOL/L (ref 0.4–2)
LEUKOCYTE ESTERASE UR QL STRIP.AUTO: NEGATIVE
LYMPHOCYTES # BLD: 1.1 K/UL (ref 0.9–3.6)
LYMPHOCYTES NFR BLD: 4 % (ref 21–52)
MAGNESIUM SERPL-MCNC: 1.8 MG/DL (ref 1.6–2.6)
MCH RBC QN AUTO: 25.8 PG (ref 24–34)
MCHC RBC AUTO-ENTMCNC: 31.6 G/DL (ref 31–37)
MCV RBC AUTO: 81.7 FL (ref 78–100)
METHGB MFR BLD: 0.3 % (ref 0–1.4)
MONOCYTES # BLD: 4.8 K/UL (ref 0.05–1.2)
MONOCYTES NFR BLD: 17 % (ref 3–10)
NEUTS BAND NFR BLD MANUAL: 2 % (ref 0–5)
NEUTS SEG # BLD: 22.5 K/UL (ref 1.8–8)
NEUTS SEG NFR BLD: 77 % (ref 40–73)
NITRITE UR QL STRIP.AUTO: NEGATIVE
NRBC # BLD: 0 K/UL (ref 0–0.01)
NRBC BLD-RTO: 0 PER 100 WBC
OXYHGB MFR BLD: 90.5 % (ref 95–99)
PCO2 BLDA: 43 MMHG (ref 35–45)
PERFORMED BY, TECHID: ABNORMAL
PERFORMED BY, TECHID: ABNORMAL
PH BLDA: 7.46 [PH] (ref 7.35–7.45)
PH UR STRIP: 6.5 [PH] (ref 5–8)
PLATELET # BLD AUTO: 281 K/UL (ref 135–420)
PLATELET COMMENTS,PCOM: ABNORMAL
PMV BLD AUTO: 10.3 FL (ref 9.2–11.8)
PO2 BLDA: 56 MMHG (ref 80–100)
POTASSIUM SERPL-SCNC: 3 MMOL/L (ref 3.5–5.5)
PROCALCITONIN SERPL-MCNC: 1.63 NG/ML
PROT SERPL-MCNC: 7 G/DL (ref 6.4–8.2)
PROT UR STRIP-MCNC: >300 MG/DL
RBC # BLD AUTO: 4.26 M/UL (ref 4.35–5.65)
RBC MORPH BLD: ABNORMAL
SAO2 % BLD: 92 % (ref 95–99)
SAO2% DEVICE SAO2% SENSOR NAME: ABNORMAL
SERVICE CMNT-IMP: ABNORMAL
SODIUM SERPL-SCNC: 137 MMOL/L (ref 136–145)
SP GR UR REFRACTOMETRY: >1.03 (ref 1–1.03)
SPECIMEN SITE: ABNORMAL
TROPONIN-HIGH SENSITIVITY: 40 NG/L (ref 0–78)
UROBILINOGEN UR QL STRIP.AUTO: 2 EU/DL (ref 0.2–1)
WBC # BLD AUTO: 28.4 K/UL (ref 4.6–13.2)

## 2022-10-24 PROCEDURE — 71275 CT ANGIOGRAPHY CHEST: CPT

## 2022-10-24 PROCEDURE — 65270000029 HC RM PRIVATE

## 2022-10-24 PROCEDURE — 96365 THER/PROPH/DIAG IV INF INIT: CPT

## 2022-10-24 PROCEDURE — 82803 BLOOD GASES ANY COMBINATION: CPT

## 2022-10-24 PROCEDURE — 87040 BLOOD CULTURE FOR BACTERIA: CPT

## 2022-10-24 PROCEDURE — 99285 EMERGENCY DEPT VISIT HI MDM: CPT

## 2022-10-24 PROCEDURE — 74011250636 HC RX REV CODE- 250/636: Performed by: EMERGENCY MEDICINE

## 2022-10-24 PROCEDURE — 74011000250 HC RX REV CODE- 250: Performed by: NURSE PRACTITIONER

## 2022-10-24 PROCEDURE — 86480 TB TEST CELL IMMUN MEASURE: CPT

## 2022-10-24 PROCEDURE — 74011000258 HC RX REV CODE- 258: Performed by: NURSE PRACTITIONER

## 2022-10-24 PROCEDURE — 87635 SARS-COV-2 COVID-19 AMP PRB: CPT

## 2022-10-24 PROCEDURE — 71045 X-RAY EXAM CHEST 1 VIEW: CPT

## 2022-10-24 PROCEDURE — 82962 GLUCOSE BLOOD TEST: CPT

## 2022-10-24 PROCEDURE — 96366 THER/PROPH/DIAG IV INF ADDON: CPT

## 2022-10-24 PROCEDURE — 81001 URINALYSIS AUTO W/SCOPE: CPT

## 2022-10-24 PROCEDURE — 74011636637 HC RX REV CODE- 636/637: Performed by: NURSE PRACTITIONER

## 2022-10-24 PROCEDURE — 85025 COMPLETE CBC W/AUTO DIFF WBC: CPT

## 2022-10-24 PROCEDURE — 74011000258 HC RX REV CODE- 258: Performed by: EMERGENCY MEDICINE

## 2022-10-24 PROCEDURE — 80053 COMPREHEN METABOLIC PANEL: CPT

## 2022-10-24 PROCEDURE — 83735 ASSAY OF MAGNESIUM: CPT

## 2022-10-24 PROCEDURE — 85379 FIBRIN DEGRADATION QUANT: CPT

## 2022-10-24 PROCEDURE — 96367 TX/PROPH/DG ADDL SEQ IV INF: CPT

## 2022-10-24 PROCEDURE — 80307 DRUG TEST PRSMV CHEM ANLYZR: CPT

## 2022-10-24 PROCEDURE — 84145 PROCALCITONIN (PCT): CPT

## 2022-10-24 PROCEDURE — 83605 ASSAY OF LACTIC ACID: CPT

## 2022-10-24 PROCEDURE — 84484 ASSAY OF TROPONIN QUANT: CPT

## 2022-10-24 PROCEDURE — 74011250637 HC RX REV CODE- 250/637: Performed by: EMERGENCY MEDICINE

## 2022-10-24 PROCEDURE — 87804 INFLUENZA ASSAY W/OPTIC: CPT

## 2022-10-24 PROCEDURE — 74011250637 HC RX REV CODE- 250/637: Performed by: NURSE PRACTITIONER

## 2022-10-24 PROCEDURE — 74011000636 HC RX REV CODE- 636: Performed by: EMERGENCY MEDICINE

## 2022-10-24 PROCEDURE — 36600 WITHDRAWAL OF ARTERIAL BLOOD: CPT

## 2022-10-24 PROCEDURE — 93005 ELECTROCARDIOGRAM TRACING: CPT

## 2022-10-24 PROCEDURE — 82077 ASSAY SPEC XCP UR&BREATH IA: CPT

## 2022-10-24 PROCEDURE — 74011250636 HC RX REV CODE- 250/636: Performed by: NURSE PRACTITIONER

## 2022-10-24 RX ORDER — LEVOFLOXACIN 5 MG/ML
750 INJECTION, SOLUTION INTRAVENOUS ONCE
Status: COMPLETED | OUTPATIENT
Start: 2022-10-24 | End: 2022-10-24

## 2022-10-24 RX ORDER — CLONIDINE HYDROCHLORIDE 0.1 MG/1
0.2 TABLET ORAL
Status: COMPLETED | OUTPATIENT
Start: 2022-10-24 | End: 2022-10-24

## 2022-10-24 RX ORDER — MAGNESIUM SULFATE 100 %
4 CRYSTALS MISCELLANEOUS AS NEEDED
Status: DISCONTINUED | OUTPATIENT
Start: 2022-10-24 | End: 2022-10-29 | Stop reason: HOSPADM

## 2022-10-24 RX ORDER — HYDRALAZINE HYDROCHLORIDE 20 MG/ML
10 INJECTION INTRAMUSCULAR; INTRAVENOUS
Status: DISCONTINUED | OUTPATIENT
Start: 2022-10-24 | End: 2022-10-29 | Stop reason: HOSPADM

## 2022-10-24 RX ORDER — ENOXAPARIN SODIUM 100 MG/ML
40 INJECTION SUBCUTANEOUS DAILY
Status: DISCONTINUED | OUTPATIENT
Start: 2022-10-25 | End: 2022-10-29 | Stop reason: HOSPADM

## 2022-10-24 RX ORDER — AMLODIPINE BESYLATE 5 MG/1
10 TABLET ORAL DAILY
Status: DISCONTINUED | OUTPATIENT
Start: 2022-10-25 | End: 2022-10-29 | Stop reason: HOSPADM

## 2022-10-24 RX ORDER — ACETAMINOPHEN 500 MG
1000 TABLET ORAL
Status: COMPLETED | OUTPATIENT
Start: 2022-10-24 | End: 2022-10-24

## 2022-10-24 RX ORDER — SODIUM CHLORIDE 0.9 % (FLUSH) 0.9 %
5-40 SYRINGE (ML) INJECTION EVERY 8 HOURS
Status: DISCONTINUED | OUTPATIENT
Start: 2022-10-24 | End: 2022-10-29 | Stop reason: HOSPADM

## 2022-10-24 RX ORDER — GUAIFENESIN 600 MG/1
600 TABLET, EXTENDED RELEASE ORAL EVERY 12 HOURS
Status: DISCONTINUED | OUTPATIENT
Start: 2022-10-24 | End: 2022-10-29 | Stop reason: HOSPADM

## 2022-10-24 RX ORDER — ACETAMINOPHEN 325 MG/1
650 TABLET ORAL
Status: DISCONTINUED | OUTPATIENT
Start: 2022-10-24 | End: 2022-10-29 | Stop reason: HOSPADM

## 2022-10-24 RX ORDER — SODIUM CHLORIDE 0.9 % (FLUSH) 0.9 %
5-40 SYRINGE (ML) INJECTION AS NEEDED
Status: DISCONTINUED | OUTPATIENT
Start: 2022-10-24 | End: 2022-10-29 | Stop reason: HOSPADM

## 2022-10-24 RX ORDER — ONDANSETRON 4 MG/1
4 TABLET, ORALLY DISINTEGRATING ORAL
Status: DISCONTINUED | OUTPATIENT
Start: 2022-10-24 | End: 2022-10-29 | Stop reason: HOSPADM

## 2022-10-24 RX ORDER — ACETAMINOPHEN 650 MG/1
650 SUPPOSITORY RECTAL
Status: DISCONTINUED | OUTPATIENT
Start: 2022-10-24 | End: 2022-10-29 | Stop reason: HOSPADM

## 2022-10-24 RX ORDER — ALBUTEROL SULFATE 90 UG/1
2 AEROSOL, METERED RESPIRATORY (INHALATION)
Status: DISCONTINUED | OUTPATIENT
Start: 2022-10-24 | End: 2022-10-29 | Stop reason: HOSPADM

## 2022-10-24 RX ORDER — OMEPRAZOLE 40 MG/1
40 CAPSULE, DELAYED RELEASE ORAL DAILY
Status: DISCONTINUED | OUTPATIENT
Start: 2022-10-25 | End: 2022-10-24

## 2022-10-24 RX ORDER — POLYETHYLENE GLYCOL 3350 17 G/17G
17 POWDER, FOR SOLUTION ORAL DAILY PRN
Status: DISCONTINUED | OUTPATIENT
Start: 2022-10-24 | End: 2022-10-29 | Stop reason: HOSPADM

## 2022-10-24 RX ORDER — POTASSIUM CHLORIDE 750 MG/1
40 TABLET, EXTENDED RELEASE ORAL
Status: COMPLETED | OUTPATIENT
Start: 2022-10-24 | End: 2022-10-24

## 2022-10-24 RX ORDER — INSULIN LISPRO 100 [IU]/ML
INJECTION, SOLUTION INTRAVENOUS; SUBCUTANEOUS
Status: DISCONTINUED | OUTPATIENT
Start: 2022-10-24 | End: 2022-10-29 | Stop reason: HOSPADM

## 2022-10-24 RX ORDER — IBUPROFEN 200 MG
1 TABLET ORAL DAILY
Status: DISCONTINUED | OUTPATIENT
Start: 2022-10-25 | End: 2022-10-24

## 2022-10-24 RX ORDER — LEVOFLOXACIN 5 MG/ML
500 INJECTION, SOLUTION INTRAVENOUS EVERY 24 HOURS
Status: DISCONTINUED | OUTPATIENT
Start: 2022-10-25 | End: 2022-10-24

## 2022-10-24 RX ORDER — ONDANSETRON 2 MG/ML
4 INJECTION INTRAMUSCULAR; INTRAVENOUS
Status: DISCONTINUED | OUTPATIENT
Start: 2022-10-24 | End: 2022-10-29 | Stop reason: HOSPADM

## 2022-10-24 RX ORDER — TAMSULOSIN HYDROCHLORIDE 0.4 MG/1
0.4 CAPSULE ORAL EVERY EVENING
Status: DISCONTINUED | OUTPATIENT
Start: 2022-10-24 | End: 2022-10-29 | Stop reason: HOSPADM

## 2022-10-24 RX ADMIN — IOPAMIDOL 100 ML: 755 INJECTION, SOLUTION INTRAVENOUS at 16:10

## 2022-10-24 RX ADMIN — CLONIDINE HYDROCHLORIDE 0.2 MG: 0.1 TABLET ORAL at 15:57

## 2022-10-24 RX ADMIN — INSULIN LISPRO 2 UNITS: 100 INJECTION, SOLUTION INTRAVENOUS; SUBCUTANEOUS at 21:47

## 2022-10-24 RX ADMIN — SODIUM CHLORIDE, PRESERVATIVE FREE 10 ML: 5 INJECTION INTRAVENOUS at 21:47

## 2022-10-24 RX ADMIN — VANCOMYCIN HYDROCHLORIDE 2000 MG: 1 INJECTION, POWDER, LYOPHILIZED, FOR SOLUTION INTRAVENOUS at 17:01

## 2022-10-24 RX ADMIN — GUAIFENESIN 600 MG: 600 TABLET ORAL at 21:46

## 2022-10-24 RX ADMIN — SODIUM CHLORIDE 500 ML: 9 INJECTION, SOLUTION INTRAVENOUS at 16:20

## 2022-10-24 RX ADMIN — ACETAMINOPHEN 1000 MG: 500 TABLET ORAL at 15:57

## 2022-10-24 RX ADMIN — PIPERACILLIN AND TAZOBACTAM 3.38 G: 3; .375 INJECTION, POWDER, FOR SOLUTION INTRAVENOUS at 16:20

## 2022-10-24 RX ADMIN — LEVOFLOXACIN 750 MG: 5 INJECTION, SOLUTION INTRAVENOUS at 21:01

## 2022-10-24 RX ADMIN — CEFTRIAXONE 1 G: 1 INJECTION, POWDER, FOR SOLUTION INTRAMUSCULAR; INTRAVENOUS at 22:53

## 2022-10-24 RX ADMIN — POTASSIUM CHLORIDE 40 MEQ: 750 TABLET, EXTENDED RELEASE ORAL at 15:57

## 2022-10-24 RX ADMIN — TAMSULOSIN HYDROCHLORIDE 0.4 MG: 0.4 CAPSULE ORAL at 21:46

## 2022-10-24 NOTE — ED TRIAGE NOTES
Pt states he has had a cough x about 2 weeks, states he started with fever about a week ago. Pt has taken multiple OTC meds with no relief. Pt states he is weak and does not have any appetite. Pt states he had COVID about 3 weeks ago, he quarantined 10 days, and felt a little better, but he is feeling bad again.   Pt retested for COVID 4 days ago, it was negative

## 2022-10-24 NOTE — H&P
History and Physical    Subjective:     Soheila Marsh is a 58 y.o. male with a past medical history of Hypertension and BPH, patient presents to this facility with a chief complaint of weakness, fever, chills, cough, and shortness of breath. Patient reports that he was diagnosed with COVID 3 weeks ago, but began to feel better, but about a week and a half ago is when the shortness of breath, cough, fever, and chills started. Patient denies chest pain, palpitations, nausea, vomiting, diarrhea, and abdominal pain. Upon arrival to the ED patient was found to be tachycardic, febrile, with a WBC of 28.4, at that time chest x-ray shown nodular bilateral parenchymal infiltrates  and CT chest shown Interval development of numerous bilateral lung parenchymal nodules, with largest nodule right lung apex maximally 2.8 cm with air bronchograms. Patient reports that he was told about the pulmonary noduleswas present in July of this year, he was supposed to had a follow-up with the PCP, but patient reports that he never received a follow-up appointment. Patient recommended to make sure he follow-up with his PCP to discuss the findings on the CT scan. Hospital medicine consulted for admission. Patient assessed at the bedside, he is alert and oriented x 4, patient is stable on room air, there is no noted distress. Patient agrees for a diagnosis of sepsis likely secondary to pneumonia, treatment to include IV antibiotics. Admit to ICU. Past Medical History:   Diagnosis Date    Diabetes ([facility] [address]     Hypertension       History reviewed. No pertinent surgical history. History reviewed. No pertinent family history. Social History     Tobacco Use    Smoking status: Every Day     Packs/day: 0.25     Types: Cigarettes    Smokeless tobacco: Never   Substance Use Topics    Alcohol use: Not on file       Prior to Admission medications    Medication Sig Start Date End Date Taking?  Authorizing Provider   ondansetron (Zofran ODT) 4 mg disintegrating tablet 1 Tablet by SubLINGual route every eight (8) hours as needed for Nausea or Vomiting. 11/30/21  Yes Bernard Ozuna MD   amLODIPine (NORVASC) 10 mg tablet Take 1 Tablet by mouth daily. 11/30/21  Yes Bernard Ozuna MD   omeprazole (PRILOSEC) 40 mg capsule Take 1 Capsule by mouth daily. 11/30/21  Yes Bernard Ozuna MD     No Known Allergies       REVIEW OF SYSTEMS:       Total of 12 systems reviewed as follows:    Positive = Red  Constitutional: Positive for malaise/fatigue and weakness and fever and chills, and decreased appetite   HENT: Negative for ear pain, headaches, negative for loss of sense of taste and smell   Eyes: Negative for blurred vision and double vision   Skin: Negative for itching, negative for open areas   Cardiovascular: Negative for chest pain, palpitations, negative for swelling   Respiratory: Positive for shortness or breath, cough, and sputum production   Gastrointestinal: Negative for abdominal pain, constipation, nausea, vomiting, and diarrhea   Genitourinary: Negative for dysuria, frequency, and hematuria   Musculoskeletal: Negative for joint pain and myalgias   Neurological: Negative for dizziness, seizures, and headaches   Psychiatric: Negative for depression and anxiousness        Objective:   VITALS:    Visit Vitals  BP (!) 179/87   Pulse 99   Temp 99.1 °F (37.3 °C)   Resp 18   Ht 5' 7\" (1.702 m)   Wt 98.9 kg (218 lb)   SpO2 98%   BMI 34.14 kg/m²       PHYSICAL EXAM:  Positive = Red  Constitutional: Alert and oriented x 3 and no noted acute distress appears to be stated age.    HENT: Atraumatic, nose midline, oropharynx clear ad moist, trachea midline, no supraclavicular   Eyes: Conjunctiva normal and pupils equal   Skin: Dry, intact, warm, and dry   Cardiovascular: Tachycardia, normal heart sounds, no murmurs, pulses palpable, no noted edema   Respiratory: Rhonchi heard throughout, no wheezes, rales, effort normal   Gastrointestinal: Appearance normal, bowel sounds are normal, bowl soft and non-tender   Genitourinary: Deferred   Musculoskeletal: Normal ROM   Neurological: Alert and oriented x 3, awake. No facial droop. No slurred speech. Hand grasps equal. Strength 5/5 in all extremities. Intact sensations   Psychiatric: Affect normal, Answers questions appropriately     __________________________________________________  Care Plan discussed with:    Comments   Patient X    Family      RN     Care Manager                    Consultant:      _______________________________________________________________________  Expected  Disposition:   Home with Family X   HH/PT/OT/RN    SNF/LTC    ESTRELLITA    ________________________________________________________________________    Labs:  Recent Results (from the past 24 hour(s))   CBC WITH AUTOMATED DIFF    Collection Time: 10/24/22  1:55 PM   Result Value Ref Range    WBC 28.4 (H) 4.6 - 13.2 K/uL    RBC 4.26 (L) 4.35 - 5.65 M/uL    HGB 11.0 (L) 13.0 - 16.0 g/dL    HCT 34.8 (L) 36.0 - 48.0 %    MCV 81.7 78.0 - 100.0 FL    MCH 25.8 24.0 - 34.0 PG    MCHC 31.6 31.0 - 37.0 g/dL    RDW 14.9 (H) 11.6 - 14.5 %    PLATELET 789 782 - 004 K/uL    MPV 10.3 9.2 - 11.8 FL    NRBC 0.0 0.0  WBC    ABSOLUTE NRBC 0.00 0.00 - 0.01 K/uL    NEUTROPHILS 77 (H) 40 - 73 %    BAND NEUTROPHILS 2 0 - 5 %    LYMPHOCYTES 4 (L) 21 - 52 %    MONOCYTES 17 (H) 3 - 10 %    EOSINOPHILS 0 0 - 5 %    BASOPHILS 0 0 - 2 %    IMMATURE GRANULOCYTES 0 %    ABS. NEUTROPHILS 22.5 (H) 1.8 - 8.0 K/UL    ABS. LYMPHOCYTES 1.1 0.9 - 3.6 K/UL    ABS. MONOCYTES 4.8 (H) 0.05 - 1.2 K/UL    ABS. EOSINOPHILS 0.0 0.0 - 0.4 K/UL    ABS. BASOPHILS 0.0 0.0 - 0.1 K/UL    ABS. IMM.  GRANS. 0.0 K/UL    DF Manual      PLATELET COMMENTS Large Platelets      RBC COMMENTS Normocytic, Normochromic     LACTIC ACID    Collection Time: 10/24/22  1:55 PM   Result Value Ref Range    Lactic acid 1.6 0.4 - 2.0 mmol/L   D DIMER    Collection Time: 10/24/22  1:55 PM   Result Value Ref Range    D DIMER 4.49 (H) <0.46 ug/ml(FEU)   METABOLIC PANEL, COMPREHENSIVE    Collection Time: 10/24/22  1:55 PM   Result Value Ref Range    Sodium 137 136 - 145 mmol/L    Potassium 3.0 (L) 3.5 - 5.5 mmol/L    Chloride 100 100 - 111 mmol/L    CO2 28 21 - 32 mmol/L    Anion gap 9 3.0 - 18.0 mmol/L    Glucose 234 (H) 74 - 99 mg/dL    BUN 8 7 - 18 mg/dL    Creatinine 0.86 0.60 - 1.30 mg/dL    BUN/Creatinine ratio 9 (L) 12 - 20      eGFR >60 >60 ml/min/1.73m2    Calcium 8.8 8.5 - 10.1 mg/dL    Bilirubin, total 0.6 0.2 - 1.0 mg/dL    AST (SGOT) 46 (H) 10 - 38 U/L    ALT (SGPT) 34 16 - 61 U/L    Alk.  phosphatase 141 (H) 45 - 117 U/L    Protein, total 7.0 6.4 - 8.2 g/dL    Albumin 2.3 (L) 3.4 - 5.0 g/dL    Globulin 4.7 (H) 2.0 - 4.0 g/dL    A-G Ratio 0.5 (L) 0.8 - 1.7     TROPONIN-HIGH SENSITIVITY    Collection Time: 10/24/22  1:55 PM   Result Value Ref Range    Troponin-High Sensitivity 40 0 - 78 ng/L   MAGNESIUM    Collection Time: 10/24/22  1:55 PM   Result Value Ref Range    Magnesium 1.8 1.6 - 2.6 mg/dL   ETHYL ALCOHOL    Collection Time: 10/24/22  1:55 PM   Result Value Ref Range    ALCOHOL(ETHYL),SERUM <3 0 - 3 mg/dL   COVID-19 RAPID TEST    Collection Time: 10/24/22  1:55 PM   Result Value Ref Range    COVID-19 rapid test Not Detected Not Detected     INFLUENZA A & B AG (RAPID TEST)    Collection Time: 10/24/22  1:55 PM   Result Value Ref Range    Influenza A Antigen Negative Negative      Influenza B Antigen Negative Negative     BLOOD GAS, ARTERIAL    Collection Time: 10/24/22  4:22 PM   Result Value Ref Range    pH 7.46 (H) 7.35 - 7.45      PCO2 43 35 - 45 mmHg    PO2 56 (L) 80 - 100 mmHg    O2 SATURATION 92 (L) 95 - 99 %    BICARBONATE 30 (H) 22 - 26 mmol/L    BASE EXCESS 5.5 (H) 0 - 3 mmol/L    O2 METHOD Room air      FIO2 21 %    SPONTANEOUS RATE 18      Sample source Arterial      SITE Left Radial      KLAUS'S TEST YES      Carboxy-Hgb 0.9 (L) 1 - 2 %    Methemoglobin 0.3 0 - 1.4 % Oxyhemoglobin 90.5 (L) 95 - 99 %    Performed by Fahad Romero     Critical value read back Called to [facility] on 10/24/2022 at 16:25        Imaging:  CTA CHEST W OR W WO CONT    Result Date: 10/24/2022  EXAM: CTA Chest CLINICAL INDICATION/HISTORY: Shortness of breath, cough and fever. Possible PE. COMPARISON: Plain films same day, prior CTA chest 8/28/2008 TECHNIQUE: Axial CT imaging from the thoracic inlet through the diaphragm with intravenous contrast utilizing CTA study for pulmonary artery evaluation. Coronal and sagittal MIP reformations were generated at a separate workstation. One or more dose reduction techniques were used on this CT: automated exposure control, adjustment of the mAs and/or kVp according to patient's size, and iterative reconstruction techniques. The specific techniques utilized on this CT exam have been documented in the patient's electronic medical record. Digital imaging and communications and medicine (DICOM) format image data are available to nonaffiliated external healthcare facilities or entities on a secure, media free, reciprocally searchable basis with patient authorization for at least a 12 month period after this study. _______________ FINDINGS: EXAM QUALITY: Overall exam quality is adequate. Pulmonary arterial enhancement is adequate. The breath hold is satisfactory. PULMONARY ARTERIES: No convincing evidence of pulmonary embolism. MEDIASTINUM: Normal heart size. No evidence of right heart strain. Aorta is unremarkable. No pericardial effusion. LYMPH NODES: Interval development of multiple mildly enlarged mediastinal and bilateral hilar lymph nodes including subcarinal lymph node. AIRWAY: Unremarkable. LUNGS: Interval development of numerous bilateral small lung parenchymal nodules, some of which are smoothly marginated but some of which have irregular margins.  The largest of these areas of masslike nodular density is within the anterior right lung apex which has some associated internal air bronchogram but measures 2.8 x 1.8 x 1.5 cm in greatest cephalocaudad, AP, and transverse dimensions. There is also small internally cavitated nodule left midlung. PLEURA: No pleural effusion or pneumothorax. UPPER ABDOMEN: Visualized upper abdomen is unremarkable. . OTHER: No acute or aggressive osseous abnormalities identified. Dorsal cord stimulator in place. _______________     1. No evidence of pulmonary embolism. 2.  Interval development of numerous bilateral lung parenchymal nodules, with largest nodule right lung apex maximally 2.8 cm with air bronchograms. Additional several nodules have irregular margins rather than typical smoothly marginated nodules as seen in metastatic disease. Small cavitated nodule left lung. Differential diagnosis includes metastatic disease as well as infectious/inflammatory etiology including septic emboli and can be correlated clinically. 3. Interval development of mediastinal and bilateral hilar adenopathy, which may either be neoplastic or inflammatory/infectious in etiology as above. XR CHEST PORT    Result Date: 10/24/2022  HISTORY: -Provided with order: Cough -Additional: None Technique : AP PORTABLE CHEST Comparison : 10/06/14 FINDINGS: HEART AND MEDIASTINUM: Borderline cardiopericardial silhouette. LUNGS AND PLEURAL SPACES: Ill-defined opacities in the right more than left mid and lower lung zones, somewhat nodular in appearance. No pleural effusion or pneumothorax. BONY THORAX AND SOFT TISSUES: Spinal stimulator wires again project over the midline lower thorax. Somewhat nodular bilateral parenchymal infiltrates are present, which can reflect infectious/inflammatory etiologies. If there is a history of malignancy, metastatic disease is not excluded.        Assessment & Plan:     Acute respiratory failure with hypoxia (HCC)  -Clinically stable on 2 L NC (continue supplemental O2)  -Covid negative  -CXR:nodular bilateral parenchymal infiltrates are present  -CT Chest: Interval development of numerous bilateral lung parenchymal nodules, with largest nodule right lung apex maximally 2.8 cm with air bronchograms, patient reports that he was told he had lung nodules about 3 months ago, but never had a follow-up, patient advised he need to have his follow-up with his PCP   -keep O2 saturation greater than 92%    Sepsis ([facility] [address]  -SIRS criteria met: patient febrile, tachycardic, and elevated WBC 28.4  -likely secondary pneumonia  -pracalcitonin: ordered  -PRN O2 to keep saturation greater than 92%  -1 IV fluids in the ED  -Broad Spectrum IV antibiotics started in the ED and will continue with Rocephin and Azithromycin  -keep MAP > than 65  -blood cultures pending  -CBC in am     Hypokalemia  -potassium 3.0, replaced with oral replacement  -continue cardiac monitoring  -repeat BMP in the am     Hypertension Urgency  -chronic, uncontrolled, likely due to patient not taking his medications,   -continue Norvasc, PRN Hydralazine for systolic greater than 784  -monitor BP closely    Diabetes Mellitus Type 2  -per patient diet control  -will implement sliding scale and accu-checks prior to meals and bedtime    BPH  -chronic, continue Flomax    Tobacco Abuse  -nicotine patch refused  -smoking cessation education     TOTAL TIME:  45 Minutes    Code Status: Full    Prophylaxis:  Lovenox    Electronically Signed : LAMONTE Lugo 25    Please note that this dictation was completed with [employer], the computer voice recognition software. Quite often unanticipated grammatical, syntax, homophones, and other interpretive errors are inadvertently transcribed by the computer software. Please disregard these errors. Please excuse any errors that have escaped final proofreading. Thank you.

## 2022-10-24 NOTE — Clinical Note
Status[de-identified] INPATIENT [101]   Type of Bed: Intensive Care [6]   Cardiac Monitoring Required?: No   Inpatient Hospitalization Certified Necessary for the Following Reasons: 3.  Patient receiving treatment that can only be provided in an inpatient setting (further clarification in H&P documentation)   Admitting Diagnosis: Pneumonia [797482]   Admitting Diagnosis: Respiratory failure [facility] [[id number]]   Admitting Physician: Evelia Goff [[id number]]   Attending Physician: Evelia Goff [[id number]]   Estimated Length of Stay: 5-7 Midnights   Discharge Plan[de-identified] Home with Office Follow-up

## 2022-10-24 NOTE — ASSESSMENT & PLAN NOTE
-Clinically stable on 2 L NC (continue supplemental O2)  -Covid negative  -CXR:nodular bilateral parenchymal infiltrates are present  -CT Chest:   Interval development of numerous bilateral lung parenchymal nodules, with largest nodule right lung apex maximally 2.8 cm with air bronchograms  -keep O2 saturation greater than 92%

## 2022-10-24 NOTE — ASSESSMENT & PLAN NOTE
-SIRS criteria met: patient febrile, tachycardic, and elevated WBC 28.4  -pracalcitonin: ordered  -PRN O2 to keep saturation greater than 92%  -1 IV fluids in the ED  -Broad Spectrum IV antibiotics started in the ED and will continue Zosyn and Levaquin  -keep MAP > than 65  -blood cultures pending  -CBC in am

## 2022-10-24 NOTE — ED NOTES
Attempted to call report, ICU was unaware they are getting a pt and had not gotten shift change report yet.

## 2022-10-24 NOTE — ED PROVIDER NOTES
EMERGENCY DEPARTMENT HISTORY AND PHYSICAL EXAM      Date: 10/24/2022  Patient Name: Joslyn Solis    History of Presenting Illness     Chief Complaint   Patient presents with    Cough    Fever       History Provided By: Patient    HPI: Joslyn Solis, 58 y.o. male PMHx HTN, DM presents with cough 2 weeks and fever which started 5 days ago. States he tested positive for CoVID 3 weeks ago at which time he had mild URI symptoms. He self quarantined at home and seemed to be doing better. He started coughing persistently 2 weeks ago and cough is productive of whitish phlegm. He has no NVD. No ansmia or ageusia. He has developed progressively worsening SOB in last 5 days. Nothing seems to help but he has used no medications and has not seen his PCP. There are no other complaints, changes, or physical findings at this time.     PCP: None    Current Facility-Administered Medications   Medication Dose Route Frequency Provider Last Rate Last Admin    sodium chloride (NS) flush 5-40 mL  5-40 mL IntraVENous Q8H Idania Chandra ACNP   10 mL at 10/24/22 2147    sodium chloride (NS) flush 5-40 mL  5-40 mL IntraVENous PRN Areli Everpatricia, ACNP        acetaminophen (TYLENOL) tablet 650 mg  650 mg Oral Q6H PRN Areli Reina, ACNP   650 mg at 10/25/22 9917    Or    acetaminophen (TYLENOL) suppository 650 mg  650 mg Rectal Q6H PRN Mekhi Chandra ACNP        polyethylene glycol (MIRALAX) packet 17 g  17 g Oral DAILY PRN Idania Chandra ACNP        ondansetron (ZOFRAN ODT) tablet 4 mg  4 mg Oral Q8H PRN Idania Chandra ACNP        Or    ondansetron (ZOFRAN) injection 4 mg  4 mg IntraVENous Q6H PRN Idania Chandra ACNP        enoxaparin (LOVENOX) injection 40 mg  40 mg SubCUTAneous DAILY Idania Chandra ACNP        amLODIPine (NORVASC) tablet 10 mg  10 mg Oral DAILY Idania Chandra ACNP        tamsulosin (FLOMAX) capsule 0.4 mg  0.4 mg Oral QPM Idania Chandra ACNP   0.4 mg at 10/24/22 2146    hydrALAZINE (APRESOLINE) 20 mg/mL injection 10 mg  10 mg IntraVENous Q6H PRN Judith Lucas, ACNP   10 mg at 10/25/22 0525    guaiFENesin ER (MUCINEX) tablet 600 mg  600 mg Oral Q12H Judith Lucas, ACNP   600 mg at 10/24/22 2146    albuterol (PROVENTIL HFA, VENTOLIN HFA, PROAIR HFA) inhaler 2 Puff  2 Puff Inhalation Q4H PRN [place] santamaria S, ACNP        glucose chewable tablet 16 g  4 Tablet Oral PRN CorazonOsacryl S, ACNP        glucagon (GLUCAGEN) injection 1 mg  1 mg IntraMUSCular PRN [place]Yvonne santamaria S, ACNP        insulin lispro (HUMALOG) injection   SubCUTAneous AC&HS Judith Lucas, ACNP   2 Units at 10/24/22 2147    cefTRIAXone (ROCEPHIN) 1 g in 0.9% sodium chloride (MBP/ADV) 50 mL MBP  1 g IntraVENous Q24H Vasrajeshe Najjar S, ACNP   IV Completed at 10/24/22 2320    azithromycin (ZITHROMAX) 500 mg in 0.9% sodium chloride 250 mL ([id number])  500 mg IntraVENous Q24H Judith Lucas, ACNP   IV Completed at 10/25/22 0702       Past History   Past Medical History:  Past Medical History:   Diagnosis Date    Diabetes ([facility] [address]     Hypertension        Past Surgical History:  History reviewed. No pertinent surgical history. Family History:  History reviewed. No pertinent family history. Social History:  Social History     Tobacco Use    Smoking status: Every Day     Packs/day: 0.25     Types: Cigarettes    Smokeless tobacco: Never       Allergies:  No Known Allergies  Review of Systems   Review of Systems   Constitutional:  Positive for chills, fatigue and fever. HENT: Negative. Respiratory:  Positive for cough and shortness of breath. Cardiovascular: Negative. Gastrointestinal: Negative. Genitourinary: Negative. Neurological: Negative. All other systems reviewed and are negative. Physical Exam   Physical Exam  Vitals and nursing note reviewed. Constitutional:       Appearance: Normal appearance. HENT:      Head: Normocephalic.    Eyes: Extraocular Movements: Extraocular movements intact. Pupils: Pupils are equal, round, and reactive to light. Cardiovascular:      Rate and Rhythm: Regular rhythm. Tachycardia present. Pulses: Normal pulses. Heart sounds: Normal heart sounds. Pulmonary:      Breath sounds: Rhonchi present. Abdominal:      Palpations: Abdomen is soft. Tenderness: There is no abdominal tenderness. Musculoskeletal:         General: Normal range of motion. Cervical back: Normal range of motion and neck supple. Skin:     General: Skin is warm. Neurological:      General: No focal deficit present. Mental Status: He is alert and oriented to person, place, and time.        Lab and Diagnostic Study Results   Labs -     Recent Results (from the past 12 hour(s))   GLUCOSE, POC    Collection Time: 10/24/22  9:01 PM   Result Value Ref Range    Glucose (POC) 188 (H) 70 - 110 mg/dL    Performed by Gilbert Dubon    URINALYSIS W/ RFLX MICROSCOPIC    Collection Time: 10/24/22  9:52 PM   Result Value Ref Range    Color Yellow      Appearance Clear      Specific gravity >1.030 (H) 1.005 - 1.030    pH (UA) 6.5 5.0 - 8.0      Protein >300 (A) Negative mg/dL    Glucose 100 (A) Negative mg/dL    Ketone Negative Negative mg/dL    Bilirubin Negative Negative      Blood Small (A) Negative      Urobilinogen 2.0 (H) 0.2 - 1.0 EU/dL    Nitrites Negative Negative      Leukocyte Esterase Negative Negative     DRUG SCREEN, URINE    Collection Time: 10/24/22  9:52 PM   Result Value Ref Range    AMPHETAMINES Negative Negative      BARBITURATES Negative Negative      BENZODIAZEPINES Negative Negative      COCAINE Negative Negative      METHADONE Negative Negative      OPIATES Positive (A) Negative      OXYCODONE SCREEN Negative Negative      PCP(PHENCYCLIDINE) Negative Negative      PROPOXYPHENE Negative Negative      THC (TH-CANNABINOL) Positive (A) Negative      TRICYCLICS Negative Negative      Fentanyl Negative Negative Drug screen comment       URINE MICROSCOPIC    Collection Time: 10/24/22  9:52 PM   Result Value Ref Range    WBC 0-4 0 - 4 /hpf    RBC 0-5 0 - 2 /hpf    Epithelial cells Few 0 - 20 /lpf    Bacteria Negative (A) None /hpf   METABOLIC PANEL, BASIC    Collection Time: 10/25/22  5:15 AM   Result Value Ref Range    Sodium 138 136 - 145 mmol/L    Potassium 3.3 (L) 3.5 - 5.5 mmol/L    Chloride 102 100 - 111 mmol/L    CO2 29 21 - 32 mmol/L    Anion gap 7 3.0 - 18.0 mmol/L    Glucose 155 (H) 74 - 99 mg/dL    BUN 7 7 - 18 mg/dL    Creatinine 0.82 0.60 - 1.30 mg/dL    BUN/Creatinine ratio 9 (L) 12 - 20      eGFR >60 >60 ml/min/1.73m2    Calcium 9.4 8.5 - 10.1 mg/dL   MAGNESIUM    Collection Time: 10/25/22  5:15 AM   Result Value Ref Range    Magnesium 1.8 1.6 - 2.6 mg/dL   CBC W/O DIFF    Collection Time: 10/25/22  5:15 AM   Result Value Ref Range    WBC 26.7 (H) 4.6 - 13.2 K/uL    RBC 4.17 (L) 4.35 - 5.65 M/uL    HGB 10.8 (L) 13.0 - 16.0 g/dL    HCT 34.1 (L) 36.0 - 48.0 %    MCV 81.8 78.0 - 100.0 FL    MCH 25.9 24.0 - 34.0 PG    MCHC 31.7 31.0 - 37.0 g/dL    RDW 15.0 (H) 11.6 - 14.5 %    PLATELET 198 726 - 705 K/uL    MPV 10.1 9.2 - 11.8 FL    NRBC 0.0 0.0  WBC    ABSOLUTE NRBC 0.00 0.00 - 0.01 K/uL   TROPONIN-HIGH SENSITIVITY    Collection Time: 10/25/22  5:15 AM   Result Value Ref Range    Troponin-High Sensitivity 39 0 - 78 ng/L   EKG, 12 LEAD, SUBSEQUENT    Collection Time: 10/25/22  5:20 AM   Result Value Ref Range    Ventricular Rate 129 BPM    Atrial Rate 129 BPM    P-R Interval 124 ms    QRS Duration 77 ms    Q-T Interval 328 ms    QTC Calculation (Bezet) 481 ms    Calculated P Axis 89 degrees    Calculated R Axis 54 degrees    Calculated T Axis 83 degrees    Diagnosis       Sinus tachycardia  Borderline prolonged QT interval     GLUCOSE, POC    Collection Time: 10/25/22  7:33 AM   Result Value Ref Range    Glucose (POC) 161 (H) 70 - 110 mg/dL    Performed by Livier Patient        Radiologic Studies - [unfilled]  CT Results  (Last 48 hours)                 10/24/22 1617  CTA CHEST W OR W WO CONT Final result    Impression:      1. No evidence of pulmonary embolism. 2.  Interval development of numerous bilateral lung parenchymal nodules, with   largest nodule right lung apex maximally 2.8 cm with air bronchograms. Additional several nodules have irregular margins rather than typical smoothly   marginated nodules as seen in metastatic disease. Small cavitated nodule left   lung. Differential diagnosis includes metastatic disease as well as   infectious/inflammatory etiology including septic emboli and can be correlated   clinically. 3. Interval development of mediastinal and bilateral hilar adenopathy, which may   either be neoplastic or inflammatory/infectious in etiology as above. Narrative:  EXAM: CTA Chest       CLINICAL INDICATION/HISTORY: Shortness of breath, cough and fever. Possible PE.       COMPARISON: Plain films same day, prior CTA chest 8/28/2008       TECHNIQUE: Axial CT imaging from the thoracic inlet through the diaphragm with   intravenous contrast utilizing CTA study for pulmonary artery evaluation. Coronal and sagittal MIP reformations were generated at a separate workstation. One or more dose reduction techniques were used on this CT: automated exposure   control, adjustment of the mAs and/or kVp according to patient's size, and   iterative reconstruction techniques. The specific techniques utilized on this CT   exam have been documented in the patient's electronic medical record. Digital   imaging and communications and medicine (DICOM) format image data are available   to nonaffiliated external healthcare facilities or entities on a secure, media   free, reciprocally searchable basis with patient authorization for at least a 12   month period after this study. _______________       FINDINGS:       EXAM QUALITY: Overall exam quality is adequate. Pulmonary arterial enhancement   is adequate. The breath hold is satisfactory. PULMONARY ARTERIES: No convincing evidence of pulmonary embolism. MEDIASTINUM: Normal heart size. No evidence of right heart strain. Aorta is   unremarkable. No pericardial effusion. LYMPH NODES: Interval development of multiple mildly enlarged mediastinal and   bilateral hilar lymph nodes including subcarinal lymph node. AIRWAY: Unremarkable. LUNGS: Interval development of numerous bilateral small lung parenchymal   nodules, some of which are smoothly marginated but some of which have irregular   margins. The largest of these areas of masslike nodular density is within the   anterior right lung apex which has some associated internal air bronchogram but   measures 2.8 x 1.8 x 1.5 cm in greatest cephalocaudad, AP, and transverse   dimensions. There is also small internally cavitated nodule left midlung. PLEURA: No pleural effusion or pneumothorax. UPPER ABDOMEN: Visualized upper abdomen is unremarkable. .       OTHER: No acute or aggressive osseous abnormalities identified. Dorsal cord   stimulator in place.       _______________                 CXR Results  (Last 48 hours)                 10/24/22 1424  XR CHEST PORT Final result    Impression:      Somewhat nodular bilateral parenchymal infiltrates are present, which can   reflect infectious/inflammatory etiologies. If there is a history of malignancy,   metastatic disease is not excluded. Narrative:  HISTORY:    -Provided with order: Cough   -Additional: None       Technique : AP PORTABLE CHEST       Comparison : 10/06/14        FINDINGS:       HEART AND MEDIASTINUM: Borderline cardiopericardial silhouette. LUNGS AND PLEURAL SPACES: Ill-defined opacities in the right more than left mid   and lower lung zones, somewhat nodular in appearance. No pleural effusion or   pneumothorax.        BONY THORAX AND SOFT TISSUES: Spinal stimulator wires again project over the   midline lower thorax. Medical Decision Making and ED Course   Differential Diagnosis & Medical Decision Making Provider Note:   Post viral nodular pneumonia with cavitation. Considering metastatic lung disease, inflammatory disease, Sarcoidosis and septic emboli amongst others. Will try to get IGRA, ACE and commence broad spectrum antibiotics. PaO2/FiO2 ratio of 262 compatible with early or mild ARDS. Will cover for Staph, commence supplemental O2 due to hypoxia on ABG, increased work of breathing with exertion noted and admit.     - I am the first and primary provider for this patient. I reviewed the vital signs, available nursing notes, past medical history, past surgical history, family history and social history. The patient's presenting problems have been discussed, and the staff are in agreement with the care plan formulated and outlined with them. I have encouraged them to ask questions as they arise throughout their visit. Vital Signs-Reviewed the patient's vital signs. Patient Vitals for the past 12 hrs:   Temp Pulse Resp BP SpO2   10/25/22 0702 98.7 °F (37.1 °C) (!) 121 20 (!) 156/82 100 %   10/25/22 0608 (!) 102.5 °F (39.2 °C) (!) 121 20 (!) 158/65 98 %   10/25/22 0508 (!) 103 °F (39.4 °C) (!) 131 24 (!) 202/150 97 %   10/25/22 0000 -- 94 -- -- --   10/24/22 2300 (!) 96.6 °F (35.9 °C) 96 16 (!) 155/68 100 %   10/24/22 2037 96.9 °F (36.1 °C) 91 20 (!) 177/87 98 %   10/24/22 2004 98.3 °F (36.8 °C) 91 18 (!) 184/90 98 %       EKG interpretation: (Preliminary): EKG Interpreted by me. Shows Sinus tach 114, NSSTT changes    ED Course:        HYPERTENSION COUNSELING: Education was provided to the patient today regarding their hypertension. Patient is made aware of their elevated blood pressure and is instructed to follow up this week with their Primary Care for a recheck.  Patient is counseled regarding consequences of chronic, uncontrolled hypertension including kidney disease, heart disease, stroke or even death. Patient states their understanding and agrees to follow up this week. Additionally, during their visit, I discussed sodium restriction, maintaining ideal body weight and regular exercise program as physiologic means to achieve blood pressure control. The patient will strive towards this. Procedures and Critical Care     Performed by: Parvin Muhammad MD  Procedures      CRITICAL CARE NOTE :    1:57 PM    IMPENDING DETERIORATION -Respiratory, Cardiovascular, and Metabolic  ASSOCIATED RISK FACTORS - Hypoxia, Dysrhythmia, and Metabolic changes  MANAGEMENT- Bedside Assessment  INTERPRETATION -  Xrays, Blood Gases, and ECG  INTERVENTIONS - hemodynamic mngmt and Metobolic interventions  CASE REVIEW - Hospitalist/Intensivist and Nursing  TREATMENT RESPONSE -Improved  PERFORMED BY - Self    NOTES   :  I have spent 90 minutes of critical care time involved in lab review, consultations with specialist, family decision- making, bedside attention and documentation. This time excludes time spent in any separate billed procedures. During this entire length of time I was immediately available to the patient . Parvin Muhammad MD    Disposition   Disposition: Admitted to ICU Medical ICU the case was discussed with the admitting physician Idania/ Lucero Spencer MD    DISCHARGE PLAN:  1. Current Discharge Medication List        CONTINUE these medications which have NOT CHANGED    Details   ondansetron (Zofran ODT) 4 mg disintegrating tablet 1 Tablet by SubLINGual route every eight (8) hours as needed for Nausea or Vomiting. Qty: 10 Tablet, Refills: 0      amLODIPine (NORVASC) 10 mg tablet Take 1 Tablet by mouth daily. Qty: 15 Tablet, Refills: 0      omeprazole (PRILOSEC) 40 mg capsule Take 1 Capsule by mouth daily. Qty: 20 Capsule, Refills: 0           2. Follow-up Information    None       3. Return to ED if worse   4.    Current Discharge Medication List        Remove if admitted/discharged    Diagnosis/Clinical Impression     Clinical Impression:   1. Pneumonia of both lungs due to infectious organism, unspecified part of lung    2. Acute respiratory failure with hypoxia (HCC)    3. Hypertensive urgency    4. Sepsis with acute hypoxic respiratory failure without septic shock, due to unspecified organism [facility])        Attestations: Emily Navarro MD, am the primary clinician of record. Please note that this dictation was completed with [employer], the computer voice recognition software. Quite often unanticipated grammatical, syntax, homophones, and other interpretive errors are inadvertently transcribed by the computer software. Please disregard these errors. Please excuse any errors that have escaped final proofreading. Thank you.

## 2022-10-24 NOTE — ASSESSMENT & PLAN NOTE
-chronic, uncontrolled  -continue Norvasc, PRN Hydralazine for systolic greater than 899  -monitor BP closely

## 2022-10-25 LAB
AMPHET UR QL SCN: NEGATIVE
ANION GAP SERPL CALC-SCNC: 7 MMOL/L (ref 3–18)
ATRIAL RATE: 115 BPM
ATRIAL RATE: 129 BPM
BACTERIA URNS QL MICRO: NEGATIVE /HPF
BARBITURATES UR QL SCN: NEGATIVE
BENZODIAZ UR QL: NEGATIVE
BUN SERPL-MCNC: 7 MG/DL (ref 7–18)
BUN/CREAT SERPL: 9 (ref 12–20)
CA-I BLD-MCNC: 9.4 MG/DL (ref 8.5–10.1)
CALCULATED P AXIS, ECG09: 89 DEGREES
CALCULATED P AXIS, ECG09: 91 DEGREES
CALCULATED R AXIS, ECG10: 54 DEGREES
CALCULATED R AXIS, ECG10: 57 DEGREES
CALCULATED T AXIS, ECG11: 77 DEGREES
CALCULATED T AXIS, ECG11: 83 DEGREES
CANNABINOIDS UR QL SCN: POSITIVE
CHLORIDE SERPL-SCNC: 102 MMOL/L (ref 100–111)
CO2 SERPL-SCNC: 29 MMOL/L (ref 21–32)
COCAINE UR QL SCN: NEGATIVE
CREAT SERPL-MCNC: 0.82 MG/DL (ref 0.6–1.3)
DIAGNOSIS, 93000: NORMAL
DIAGNOSIS, 93000: NORMAL
DRUG SCRN COMMENT,DRGCM: ABNORMAL
EPITH CASTS URNS QL MICRO: ABNORMAL /LPF (ref 0–20)
ERYTHROCYTE [DISTWIDTH] IN BLOOD BY AUTOMATED COUNT: 15 % (ref 11.6–14.5)
FENTANYL UR QL SCN: NEGATIVE
GLUCOSE BLD STRIP.AUTO-MCNC: 161 MG/DL (ref 70–110)
GLUCOSE BLD STRIP.AUTO-MCNC: 163 MG/DL (ref 70–110)
GLUCOSE BLD STRIP.AUTO-MCNC: 201 MG/DL (ref 70–110)
GLUCOSE BLD STRIP.AUTO-MCNC: 249 MG/DL (ref 70–110)
GLUCOSE SERPL-MCNC: 155 MG/DL (ref 74–99)
HCT VFR BLD AUTO: 34.1 % (ref 36–48)
HGB BLD-MCNC: 10.8 G/DL (ref 13–16)
MAGNESIUM SERPL-MCNC: 1.8 MG/DL (ref 1.6–2.6)
MCH RBC QN AUTO: 25.9 PG (ref 24–34)
MCHC RBC AUTO-ENTMCNC: 31.7 G/DL (ref 31–37)
MCV RBC AUTO: 81.8 FL (ref 78–100)
METHADONE UR QL: NEGATIVE
NRBC # BLD: 0 K/UL (ref 0–0.01)
NRBC BLD-RTO: 0 PER 100 WBC
OPIATES UR QL: POSITIVE
OXYCODONE UR QL SCN: NEGATIVE
P-R INTERVAL, ECG05: 110 MS
P-R INTERVAL, ECG05: 124 MS
PCP UR QL: NEGATIVE
PERFORMED BY, TECHID: ABNORMAL
PLATELET # BLD AUTO: 277 K/UL (ref 135–420)
PMV BLD AUTO: 10.1 FL (ref 9.2–11.8)
POTASSIUM SERPL-SCNC: 3.3 MMOL/L (ref 3.5–5.5)
PROPOXYPH UR QL: NEGATIVE
Q-T INTERVAL, ECG07: 328 MS
Q-T INTERVAL, ECG07: 342 MS
QRS DURATION, ECG06: 77 MS
QRS DURATION, ECG06: 81 MS
QTC CALCULATION (BEZET), ECG08: 472 MS
QTC CALCULATION (BEZET), ECG08: 481 MS
RBC # BLD AUTO: 4.17 M/UL (ref 4.35–5.65)
RBC #/AREA URNS HPF: ABNORMAL /HPF (ref 0–2)
SODIUM SERPL-SCNC: 138 MMOL/L (ref 136–145)
TRICYCLICS UR QL: NEGATIVE
TROPONIN-HIGH SENSITIVITY: 39 NG/L (ref 0–78)
VENTRICULAR RATE, ECG03: 114 BPM
VENTRICULAR RATE, ECG03: 129 BPM
WBC # BLD AUTO: 26.7 K/UL (ref 4.6–13.2)
WBC URNS QL MICRO: ABNORMAL /HPF (ref 0–4)

## 2022-10-25 PROCEDURE — 77010033678 HC OXYGEN DAILY

## 2022-10-25 PROCEDURE — 74011000250 HC RX REV CODE- 250: Performed by: NURSE PRACTITIONER

## 2022-10-25 PROCEDURE — 93005 ELECTROCARDIOGRAM TRACING: CPT

## 2022-10-25 PROCEDURE — 74011000258 HC RX REV CODE- 258: Performed by: NURSE PRACTITIONER

## 2022-10-25 PROCEDURE — 84484 ASSAY OF TROPONIN QUANT: CPT

## 2022-10-25 PROCEDURE — 80048 BASIC METABOLIC PNL TOTAL CA: CPT

## 2022-10-25 PROCEDURE — 74011636637 HC RX REV CODE- 636/637: Performed by: NURSE PRACTITIONER

## 2022-10-25 PROCEDURE — 74011250636 HC RX REV CODE- 250/636: Performed by: NURSE PRACTITIONER

## 2022-10-25 PROCEDURE — 65270000029 HC RM PRIVATE

## 2022-10-25 PROCEDURE — 74011250637 HC RX REV CODE- 250/637: Performed by: INTERNAL MEDICINE

## 2022-10-25 PROCEDURE — 82962 GLUCOSE BLOOD TEST: CPT

## 2022-10-25 PROCEDURE — 85027 COMPLETE CBC AUTOMATED: CPT

## 2022-10-25 PROCEDURE — 36415 COLL VENOUS BLD VENIPUNCTURE: CPT

## 2022-10-25 PROCEDURE — 74011250637 HC RX REV CODE- 250/637: Performed by: NURSE PRACTITIONER

## 2022-10-25 PROCEDURE — 94761 N-INVAS EAR/PLS OXIMETRY MLT: CPT

## 2022-10-25 PROCEDURE — 83735 ASSAY OF MAGNESIUM: CPT

## 2022-10-25 RX ORDER — GUAIFENESIN 100 MG/5ML
100 SOLUTION ORAL
Status: DISCONTINUED | OUTPATIENT
Start: 2022-10-25 | End: 2022-10-29 | Stop reason: HOSPADM

## 2022-10-25 RX ADMIN — HYDRALAZINE HYDROCHLORIDE 10 MG: 20 INJECTION INTRAMUSCULAR; INTRAVENOUS at 05:25

## 2022-10-25 RX ADMIN — CEFTRIAXONE 1 G: 1 INJECTION, POWDER, FOR SOLUTION INTRAMUSCULAR; INTRAVENOUS at 21:37

## 2022-10-25 RX ADMIN — ACETAMINOPHEN 650 MG: 325 TABLET ORAL at 05:08

## 2022-10-25 RX ADMIN — SODIUM CHLORIDE 500 ML: 9 INJECTION, SOLUTION INTRAVENOUS at 05:25

## 2022-10-25 RX ADMIN — INSULIN LISPRO 4 UNITS: 100 INJECTION, SOLUTION INTRAVENOUS; SUBCUTANEOUS at 12:54

## 2022-10-25 RX ADMIN — ONDANSETRON 4 MG: 2 INJECTION INTRAMUSCULAR; INTRAVENOUS at 17:28

## 2022-10-25 RX ADMIN — INSULIN LISPRO 4 UNITS: 100 INJECTION, SOLUTION INTRAVENOUS; SUBCUTANEOUS at 21:36

## 2022-10-25 RX ADMIN — AMLODIPINE BESYLATE 10 MG: 5 TABLET ORAL at 09:42

## 2022-10-25 RX ADMIN — SODIUM CHLORIDE, PRESERVATIVE FREE 10 ML: 5 INJECTION INTRAVENOUS at 21:36

## 2022-10-25 RX ADMIN — GUAIFENESIN 600 MG: 600 TABLET ORAL at 21:36

## 2022-10-25 RX ADMIN — ACETAMINOPHEN 650 MG: 325 TABLET ORAL at 16:56

## 2022-10-25 RX ADMIN — AZITHROMYCIN DIHYDRATE 500 MG: 500 INJECTION, POWDER, LYOPHILIZED, FOR SOLUTION INTRAVENOUS at 05:26

## 2022-10-25 RX ADMIN — TAMSULOSIN HYDROCHLORIDE 0.4 MG: 0.4 CAPSULE ORAL at 17:01

## 2022-10-25 RX ADMIN — ENOXAPARIN SODIUM 40 MG: 100 INJECTION SUBCUTANEOUS at 09:42

## 2022-10-25 RX ADMIN — GUAIFENESIN 600 MG: 600 TABLET ORAL at 09:42

## 2022-10-25 RX ADMIN — GUAIFENESIN 100 MG: 100 SOLUTION ORAL at 15:07

## 2022-10-25 NOTE — PROGRESS NOTES
2042 - Pt admitted to room 251 via stretcher and was able to ambulate from stretcher to bed with steady gait. Telemetry box 2e #5 placed on pt and is showing NSR. Pt has some SOB with excretion and coarse/diminished LS present. Oriented to room, call light and bed controls. 2101 - Levaquin given per orders. . HS snack given. 2152 - HS medication given, pt tolerated well. 2U SSI given. Urine specimen collected and sent to lab.     2300 - Levaquin completed, rocephin hung per orders. VSS.     2320 - Rocephin completed, pt resting in bed comfortably, denies any needs at this time. CBWR     0200 - Pt resting comfortably in bed with eyes closed, NAD noted. CBWR     3099-5261 - Pt sitting on side of bed gasping for air and c/o CP. Temp. 103  /150 spo2 89% RA. Placed pt on 2L NC. Notified hospitalist of pt's condition. STAT ekg and trop. Ordered. 500 ml bolus ordered. PRN tylenol, hydralazine, scheduled bolus and scheduled antibiotic given per orders. 3030 - VS obtained. Temp 102. 5. . /65. Pt states he is no longer having CP.     0702 - VS obtained. Temp. 98.7  /82. Bedside shift report given to oncoming nurse. Pt denies any pain at this time.

## 2022-10-25 NOTE — PROGRESS NOTES
HOSPITALIST PROGRESS NOTE  Henry Hollis MD, [address]          Daily Progress Note: 10/25/2022      Subjective:     Patient is alert and oriented x4. Significantly improved breathing and shortness of breath however fever of 102.5 early this a.m. Continues to have mild cough and counseled on continue treatment for pneumonia with new cavitary pulmonary nodules noted on repeat CT. Patient denies any weight loss, hemoptysis or exposure to any family with tuberculosis. However, ED physician has ordered IGRA and ACE inhibitor test for possible sarcoidosis.       Medications reviewed  Current Facility-Administered Medications   Medication Dose Route Frequency    guaiFENesin (ROBITUSSIN) 100 mg/5 mL oral liquid 100 mg  100 mg Oral Q4H PRN    sodium chloride (NS) flush 5-40 mL  5-40 mL IntraVENous Q8H    sodium chloride (NS) flush 5-40 mL  5-40 mL IntraVENous PRN    acetaminophen (TYLENOL) tablet 650 mg  650 mg Oral Q6H PRN    Or    acetaminophen (TYLENOL) suppository 650 mg  650 mg Rectal Q6H PRN    polyethylene glycol (MIRALAX) packet 17 g  17 g Oral DAILY PRN    ondansetron (ZOFRAN ODT) tablet 4 mg  4 mg Oral Q8H PRN    Or    ondansetron (ZOFRAN) injection 4 mg  4 mg IntraVENous Q6H PRN    enoxaparin (LOVENOX) injection 40 mg  40 mg SubCUTAneous DAILY    amLODIPine (NORVASC) tablet 10 mg  10 mg Oral DAILY    tamsulosin (FLOMAX) capsule 0.4 mg  0.4 mg Oral QPM    hydrALAZINE (APRESOLINE) 20 mg/mL injection 10 mg  10 mg IntraVENous Q6H PRN    guaiFENesin ER (MUCINEX) tablet 600 mg  600 mg Oral Q12H    albuterol (PROVENTIL HFA, VENTOLIN HFA, PROAIR HFA) inhaler 2 Puff  2 Puff Inhalation Q4H PRN    glucose chewable tablet 16 g  4 Tablet Oral PRN    glucagon (GLUCAGEN) injection 1 mg  1 mg IntraMUSCular PRN    insulin lispro (HUMALOG) injection   SubCUTAneous AC&HS    cefTRIAXone (ROCEPHIN) 1 g in 0.9% sodium chloride (MBP/ADV) 50 mL MBP  1 g IntraVENous Q24H    azithromycin (ZITHROMAX) 500 mg in 0.9% sodium chloride 250 mL ([id number])  500 mg IntraVENous Q24H       Review of Systems:   A comprehensive review of systems was negative except for that written in the HPI. Objective:   Physical Exam:     Visit Vitals  BP (!) 179/87 (BP 1 Location: Left upper arm, BP Patient Position: At rest)   Pulse (!) 104   Temp 98 °F (36.7 °C)   Resp 20   Ht 5' 7\" (1.702 m)   Wt 95.8 kg (211 lb 1.6 oz)   SpO2 99%   BMI 33.06 kg/m²    O2 Flow Rate (L/min): (S) 1 l/min (titrated by sats) O2 Device: Nasal cannula (1L)  Patient Vitals for the past 8 hrs:   Temp Pulse BP SpO2   10/25/22 1202 98 °F (36.7 °C) (!) 104 (!) 179/87 99 %   10/25/22 1200 -- (!) 112 -- --   10/25/22 0814 -- -- -- 99 %   10/25/22 0800 -- (!) 120 -- --          Temp (24hrs), Av.1 °F (37.3 °C), Min:96.6 °F (35.9 °C), Max:103 °F (39.4 °C)    No intake/output data recorded. 10/23 1901 - 10/25 0700  In: 1214 [P.O.:462; I.V.:800]  Out: 1000 [Urine:1000]    General:  Alert, cooperative, no distress, appears stated age. Lungs:   Diminished breath sounds throughout with mild bibasilar without any rhonchi or wheezing noted. Chest wall:  No tenderness or deformity. Heart:  Regular rate and rhythm, S1, S2 normal, no murmur, click, rub or gallop. Abdomen:   Soft, non-tender. Bowel sounds normal. No masses,  No organomegaly. Extremities: Extremities normal, atraumatic, no cyanosis or edema. Pulses: 2+ and symmetric all extremities.    Skin: Skin color, texture, turgor normal. No rashes or lesions   Neurologic: No gross sensory or motor deficits     Data Review:       Recent Days:  Recent Labs     10/25/22  0515 10/24/22  1355   WBC 26.7* 28.4*   HGB 10.8* 11.0*   HCT 34.1* 34.8*    281     Recent Labs     10/25/22  0515 10/24/22  1355    137   K 3.3* 3.0*    100   CO2 29 28   * 234*   BUN 7 8   CREA 0.82 0.86   CA 9.4 8.8   MG 1.8 1.8   ALB  --  2.3*   TBILI  --  0.6   ALT --  34     Recent Labs     10/24/22  1622   PH 7.46*   PCO2 43   PO2 56*   HCO3 30*   FIO2 21       24 Hour Results:  Recent Results (from the past 24 hour(s))   BLOOD GAS, ARTERIAL    Collection Time: 10/24/22  4:22 PM   Result Value Ref Range    pH 7.46 (H) 7.35 - 7.45      PCO2 43 35 - 45 mmHg    PO2 56 (L) 80 - 100 mmHg    O2 SATURATION 92 (L) 95 - 99 %    BICARBONATE 30 (H) 22 - 26 mmol/L    BASE EXCESS 5.5 (H) 0 - 3 mmol/L    O2 METHOD Room air      FIO2 21 %    SPONTANEOUS RATE 18      Sample source Arterial      SITE Left Radial      KLAUS'S TEST YES      Carboxy-Hgb 0.9 (L) 1 - 2 %    Methemoglobin 0.3 0 - 1.4 %    Oxyhemoglobin 90.5 (L) 95 - 99 %    Performed by Kb Kline     Critical value read back Called to [facility] on 10/24/2022 at 16:25    GLUCOSE, POC    Collection Time: 10/24/22  9:01 PM   Result Value Ref Range    Glucose (POC) 188 (H) 70 - 110 mg/dL    Performed by Judd Hardy    URINALYSIS W/ RFLX MICROSCOPIC    Collection Time: 10/24/22  9:52 PM   Result Value Ref Range    Color Yellow      Appearance Clear      Specific gravity >1.030 (H) 1.005 - 1.030    pH (UA) 6.5 5.0 - 8.0      Protein >300 (A) Negative mg/dL    Glucose 100 (A) Negative mg/dL    Ketone Negative Negative mg/dL    Bilirubin Negative Negative      Blood Small (A) Negative      Urobilinogen 2.0 (H) 0.2 - 1.0 EU/dL    Nitrites Negative Negative      Leukocyte Esterase Negative Negative     DRUG SCREEN, URINE    Collection Time: 10/24/22  9:52 PM   Result Value Ref Range    AMPHETAMINES Negative Negative      BARBITURATES Negative Negative      BENZODIAZEPINES Negative Negative      COCAINE Negative Negative      METHADONE Negative Negative      OPIATES Positive (A) Negative      OXYCODONE SCREEN Negative Negative      PCP(PHENCYCLIDINE) Negative Negative      PROPOXYPHENE Negative Negative      THC (TH-CANNABINOL) Positive (A) Negative      TRICYCLICS Negative Negative      Fentanyl Negative Negative      Drug screen comment URINE MICROSCOPIC    Collection Time: 10/24/22  9:52 PM   Result Value Ref Range    WBC 0-4 0 - 4 /hpf    RBC 0-5 0 - 2 /hpf    Epithelial cells Few 0 - 20 /lpf    Bacteria Negative (A) None /hpf   METABOLIC PANEL, BASIC    Collection Time: 10/25/22  5:15 AM   Result Value Ref Range    Sodium 138 136 - 145 mmol/L    Potassium 3.3 (L) 3.5 - 5.5 mmol/L    Chloride 102 100 - 111 mmol/L    CO2 29 21 - 32 mmol/L    Anion gap 7 3.0 - 18.0 mmol/L    Glucose 155 (H) 74 - 99 mg/dL    BUN 7 7 - 18 mg/dL    Creatinine 0.82 0.60 - 1.30 mg/dL    BUN/Creatinine ratio 9 (L) 12 - 20      eGFR >60 >60 ml/min/1.73m2    Calcium 9.4 8.5 - 10.1 mg/dL   MAGNESIUM    Collection Time: 10/25/22  5:15 AM   Result Value Ref Range    Magnesium 1.8 1.6 - 2.6 mg/dL   CBC W/O DIFF    Collection Time: 10/25/22  5:15 AM   Result Value Ref Range    WBC 26.7 (H) 4.6 - 13.2 K/uL    RBC 4.17 (L) 4.35 - 5.65 M/uL    HGB 10.8 (L) 13.0 - 16.0 g/dL    HCT 34.1 (L) 36.0 - 48.0 %    MCV 81.8 78.0 - 100.0 FL    MCH 25.9 24.0 - 34.0 PG    MCHC 31.7 31.0 - 37.0 g/dL    RDW 15.0 (H) 11.6 - 14.5 %    PLATELET 068 570 - 727 K/uL    MPV 10.1 9.2 - 11.8 FL    NRBC 0.0 0.0  WBC    ABSOLUTE NRBC 0.00 0.00 - 0.01 K/uL   TROPONIN-HIGH SENSITIVITY    Collection Time: 10/25/22  5:15 AM   Result Value Ref Range    Troponin-High Sensitivity 39 0 - 78 ng/L   EKG, 12 LEAD, SUBSEQUENT    Collection Time: 10/25/22  5:20 AM   Result Value Ref Range    Ventricular Rate 129 BPM    Atrial Rate 129 BPM    P-R Interval 124 ms    QRS Duration 77 ms    Q-T Interval 328 ms    QTC Calculation (Bezet) 481 ms    Calculated P Axis 89 degrees    Calculated R Axis 54 degrees    Calculated T Axis 83 degrees    Diagnosis       Sinus tachycardia  Borderline prolonged QT interval    Confirmed by LARA Moss ([id number]) on 10/25/2022 1:15:15 PM     GLUCOSE, POC    Collection Time: 10/25/22  7:33 AM   Result Value Ref Range    Glucose (POC) 161 (H) 70 - 110 mg/dL    Performed by [employer]ley    GLUCOSE, POC    Collection Time: 10/25/22 10:53 AM   Result Value Ref Range    Glucose (POC) 249 (H) 70 - 110 mg/dL    Performed by Carina Hieu    GLUCOSE, POC    Collection Time: 10/25/22  3:50 PM   Result Value Ref Range    Glucose (POC) 163 (H) 70 - 110 mg/dL    Performed by Carina Lima            Assessment/Plan:     Acute respiratory failure with hypoxia ([address]  -Clinically stable on 1 L NC (continue supplemental O2)  -Covid negative  -CXR:nodular bilateral parenchymal infiltrates are present  -CT Chest: Interval development of numerous bilateral lung parenchymal nodules, with largest nodule right lung apex maximally 2.8 cm with air bronchograms, patient reports that he was told he had lung nodules about 3 months ago, but never had a follow-up, patient advised he need to have his follow-up with his PCP   IGRA and ACE inhibitor test ordered by ED physician, for possible sarcoidosis. SIRS/pneumonia  -SIRS criteria met: patient febrile, tachycardic, and elevated WBC 28.4  Secondary pneumonia  Continue with IV Rocephin and azithromycin. Blood cultures pending. Hypokalemia  Improving with oral replacement. Hypertension Urgency  Continue Norvasc, PRN Hydralazine for systolic greater than 887     Diabetes Mellitus Type 2  Per patient diet control  Continue sliding scale and accu-checks prior to meals and bedtime     BPH  Continue Flomax     Tobacco Abuse  Nicotine patch refused  smoking cessation education     Code Status: Full     Prophylaxis:  Lovenox      Care Plan discussed with: Patient/Family and Nurse    Total time spent with patient: 30 minutes. With greater than 50% spent in coordination of care and counseling.     Vernon Garcia MD

## 2022-10-25 NOTE — PROGRESS NOTES
Problem: Falls - Risk of  Goal: *Absence of Falls  Description: Document Kim Dougherty Fall Risk and appropriate interventions in the flowsheet. Outcome: Progressing Towards Goal  Note: Fall Risk Interventions:            Medication Interventions: Teach patient to arise slowly                   Problem: Patient Education: Go to Patient Education Activity  Goal: Patient/Family Education  Outcome: Progressing Towards Goal     Problem:  Activity Intolerance  Goal: *Oxygen saturation during activity within specified parameters  Outcome: Progressing Towards Goal  Goal: *Able to remain out of bed as prescribed  Outcome: Progressing Towards Goal     Problem: Patient Education: Go to Patient Education Activity  Goal: Patient/Family Education  Outcome: Progressing Towards Goal     Problem: Diabetes Maintenance:Admission  Goal: Activity/Safety  Outcome: Progressing Towards Goal  Goal: Diagnostic Tests/Procedures  Outcome: Progressing Towards Goal  Goal: Nutrition  Outcome: Progressing Towards Goal  Goal: Medications  Outcome: Progressing Towards Goal  Goal: Treatments/Interventions/Procedures  Outcome: Progressing Towards Goal     Problem: Diabetes Maintenance:Ongoing  Goal: Activity/Safety  Outcome: Progressing Towards Goal  Goal: Nutrition  Outcome: Progressing Towards Goal  Goal: Medications  Outcome: Progressing Towards Goal  Goal: Treatments/Interventsions/Procedures  Outcome: Progressing Towards Goal  Goal: *Blood Glucose 80 to 180 md/dl  Outcome: Progressing Towards Goal     Problem: Diabetes Maintenance:Discharge Outcomes  Goal: *Describes follow-up/return visits to physicians  Outcome: Progressing Towards Goal  Goal: *Blood glucose at patient's target range or approaching  Outcome: Progressing Towards Goal  Goal: *Aware of nutrition guidelines  Outcome: Progressing Towards Goal  Goal: *Verbalizes information about medication  Description: Verbalizes name, dosage, time, side effects, and number of days to  continue medications.   Outcome: Progressing Towards Goal  Goal: *Describes goals, rules, symptoms, and treatments  Description: Describes blood glucose goals, monitoring, sick day rules,  hypo/hyperglycemia prevention, symptoms, and treatment  Outcome: Progressing Towards Goal  Goal: *Describes available outpatient diabetes resources and support systems  Outcome: Progressing Towards Goal     Problem: Airway Clearance - Ineffective  Goal: *Patent airway  Outcome: Progressing Towards Goal  Goal: *Absence of airway secretions  Outcome: Progressing Towards Goal  Goal: *Able to cough effectively  Outcome: Progressing Towards Goal     Problem: Patient Education: Go to Patient Education Activity  Goal: Patient/Family Education  Outcome: Progressing Towards Goal

## 2022-10-25 NOTE — PROGRESS NOTES
Problem: Falls - Risk of  Goal: *Absence of Falls  Description: Document Sridhar Counts Fall Risk and appropriate interventions in the flowsheet. Outcome: Progressing Towards Goal  Note: Fall Risk Interventions:            Medication Interventions: Teach patient to arise slowly                   Problem:  Activity Intolerance  Goal: *Oxygen saturation during activity within specified parameters  Outcome: Progressing Towards Goal     Problem: Diabetes Maintenance:Admission  Goal: Activity/Safety  Outcome: Progressing Towards Goal  Goal: Nutrition  Outcome: Progressing Towards Goal  Goal: Medications  Outcome: Progressing Towards Goal     Problem: Airway Clearance - Ineffective  Goal: *Able to cough effectively  Outcome: Progressing Towards Goal

## 2022-10-25 NOTE — PROGRESS NOTES
0700- bedside shift report completed and care of the patient, vss, patient states \"I feel much better than before\"    0942- am medications administered     1507- cough syrup administered, placed on precautions for TB due to having quantiferon blood test ordered by ER provided last night, nursing supervisor and Dr. Una Lima aware as well as patient and wife    [id number]- on side of bed SOB, given tylenol and ice water, temp 100.1 orally    1733- zofran administered, saltine crackers provided

## 2022-10-25 NOTE — PROGRESS NOTES
Care Management Interventions  PCP Verified by CM: No  Palliative Care Criteria Met (RRAT>21 & CHF Dx)?: No  Transition of Care Consult (CM Consult): Discharge Planning  Physical Therapy Consult: No  Occupational Therapy Consult: No  Speech Therapy Consult: No  Support Systems: Spouse/Significant Other, Child(brandy)  Confirm Follow Up Transport: Family  The Plan for Transition of Care is Related to the Following Treatment Goals : Patient centered discharge planning to ensure smooth transition to community and PLOF. Discharge Location  Patient Expects to be Discharged to[de-identified] Home      Reason for Admission:   Chart reviewed and patient interviewed. Pt presented to ED with complaints of weakness, fever, chills cough and SOB. Pt had COVID diagnosis 3 weeks prior. Pt found to be tachycardic, febrile, WBCs 28.4, CXR shows infiltrates with nodules present. Hospitalist consulted for admission. RUR Score:    10%                 Plan for utilizing home health:   Denies need        PCP: First and Last name:  None     Name of Practice:    Are you a current patient: Yes/No:    Approximate date of last visit:  None to review, needs new PCP at [place]. Can you participate in a virtual visit with your PCP:                     Current Advanced Directive/Advance Care Plan: Full Code      Healthcare Decision Maker:   Click here to complete [address] including selection of the Healthcare Decision Maker Relationship (ie \"Primary\")                             Transition of Care Plan:    Plan of care includes medication reconciliation to be complete, education will be given with teach back method, and will identify need for post-acute care follow up. Patient centered discharge planning to ensure smooth transition to community and PLOF. Patient lives home with wife and is independent of ADLs. POC is to return home with wife at [place]. CM following for DC needs.

## 2022-10-25 NOTE — ROUTINE PROCESS
TRANSFER - OUT REPORT:    Verbal report given to maylin on Joslyn Solis  being transferred to  for routine progression of care       Report consisted of patients Situation, Background, Assessment and   Recommendations(SBAR). Information from the following report(s) ED Summary was reviewed with the receiving nurse. Lines:   Peripheral IV 10/24/22 Right Forearm (Active)        Opportunity for questions and clarification was provided.       Patient transported with:   Monitor  Registered Nurse

## 2022-10-26 LAB
ANION GAP SERPL CALC-SCNC: 8 MMOL/L (ref 3–18)
BUN SERPL-MCNC: 8 MG/DL (ref 7–18)
BUN/CREAT SERPL: 10 (ref 12–20)
CA-I BLD-MCNC: 9.3 MG/DL (ref 8.5–10.1)
CHLORIDE SERPL-SCNC: 98 MMOL/L (ref 100–111)
CO2 SERPL-SCNC: 27 MMOL/L (ref 21–32)
CREAT SERPL-MCNC: 0.79 MG/DL (ref 0.6–1.3)
ERYTHROCYTE [DISTWIDTH] IN BLOOD BY AUTOMATED COUNT: 15.1 % (ref 11.6–14.5)
GLUCOSE BLD STRIP.AUTO-MCNC: 179 MG/DL (ref 70–110)
GLUCOSE BLD STRIP.AUTO-MCNC: 191 MG/DL (ref 70–110)
GLUCOSE BLD STRIP.AUTO-MCNC: 194 MG/DL (ref 70–110)
GLUCOSE BLD STRIP.AUTO-MCNC: 198 MG/DL (ref 70–110)
GLUCOSE BLD STRIP.AUTO-MCNC: 233 MG/DL (ref 70–110)
GLUCOSE SERPL-MCNC: 150 MG/DL (ref 74–99)
HCT VFR BLD AUTO: 32.3 % (ref 36–48)
HGB BLD-MCNC: 10.3 G/DL (ref 13–16)
MAGNESIUM SERPL-MCNC: 1.8 MG/DL (ref 1.6–2.6)
MCH RBC QN AUTO: 26.1 PG (ref 24–34)
MCHC RBC AUTO-ENTMCNC: 31.9 G/DL (ref 31–37)
MCV RBC AUTO: 82 FL (ref 78–100)
NRBC # BLD: 0 K/UL (ref 0–0.01)
NRBC BLD-RTO: 0 PER 100 WBC
PERFORMED BY, TECHID: ABNORMAL
PLATELET # BLD AUTO: 253 K/UL (ref 135–420)
PMV BLD AUTO: 10.5 FL (ref 9.2–11.8)
POTASSIUM SERPL-SCNC: 4.9 MMOL/L (ref 3.5–5.5)
RBC # BLD AUTO: 3.94 M/UL (ref 4.35–5.65)
SODIUM SERPL-SCNC: 133 MMOL/L (ref 136–145)
WBC # BLD AUTO: 26.1 K/UL (ref 4.6–13.2)

## 2022-10-26 PROCEDURE — 74011250637 HC RX REV CODE- 250/637: Performed by: NURSE PRACTITIONER

## 2022-10-26 PROCEDURE — 36415 COLL VENOUS BLD VENIPUNCTURE: CPT

## 2022-10-26 PROCEDURE — 74011250636 HC RX REV CODE- 250/636: Performed by: NURSE PRACTITIONER

## 2022-10-26 PROCEDURE — 82962 GLUCOSE BLOOD TEST: CPT

## 2022-10-26 PROCEDURE — 85027 COMPLETE CBC AUTOMATED: CPT

## 2022-10-26 PROCEDURE — 74011250637 HC RX REV CODE- 250/637: Performed by: INTERNAL MEDICINE

## 2022-10-26 PROCEDURE — 74011636637 HC RX REV CODE- 636/637: Performed by: NURSE PRACTITIONER

## 2022-10-26 PROCEDURE — 65270000029 HC RM PRIVATE

## 2022-10-26 PROCEDURE — 80048 BASIC METABOLIC PNL TOTAL CA: CPT

## 2022-10-26 PROCEDURE — 94761 N-INVAS EAR/PLS OXIMETRY MLT: CPT

## 2022-10-26 PROCEDURE — 77010033678 HC OXYGEN DAILY

## 2022-10-26 PROCEDURE — 74011000258 HC RX REV CODE- 258: Performed by: NURSE PRACTITIONER

## 2022-10-26 PROCEDURE — 74011000250 HC RX REV CODE- 250: Performed by: NURSE PRACTITIONER

## 2022-10-26 PROCEDURE — 83735 ASSAY OF MAGNESIUM: CPT

## 2022-10-26 RX ORDER — PREDNISONE 10 MG/1
10 TABLET ORAL
Status: DISCONTINUED | OUTPATIENT
Start: 2022-10-27 | End: 2022-10-26

## 2022-10-26 RX ORDER — SODIUM CHLORIDE 9 MG/ML
75 INJECTION, SOLUTION INTRAVENOUS CONTINUOUS
Status: DISCONTINUED | OUTPATIENT
Start: 2022-10-26 | End: 2022-10-29

## 2022-10-26 RX ORDER — CARVEDILOL 6.25 MG/1
6.25 TABLET ORAL 2 TIMES DAILY WITH MEALS
Status: DISCONTINUED | OUTPATIENT
Start: 2022-10-26 | End: 2022-10-29 | Stop reason: HOSPADM

## 2022-10-26 RX ORDER — LISINOPRIL 5 MG/1
10 TABLET ORAL DAILY
Status: DISCONTINUED | OUTPATIENT
Start: 2022-10-26 | End: 2022-10-29 | Stop reason: HOSPADM

## 2022-10-26 RX ADMIN — AMLODIPINE BESYLATE 10 MG: 5 TABLET ORAL at 08:27

## 2022-10-26 RX ADMIN — LISINOPRIL 10 MG: 5 TABLET ORAL at 16:02

## 2022-10-26 RX ADMIN — INSULIN LISPRO 2 UNITS: 100 INJECTION, SOLUTION INTRAVENOUS; SUBCUTANEOUS at 12:10

## 2022-10-26 RX ADMIN — SODIUM CHLORIDE 75 ML/HR: 9 INJECTION, SOLUTION INTRAVENOUS at 14:05

## 2022-10-26 RX ADMIN — INSULIN LISPRO 4 UNITS: 100 INJECTION, SOLUTION INTRAVENOUS; SUBCUTANEOUS at 16:02

## 2022-10-26 RX ADMIN — SODIUM CHLORIDE, PRESERVATIVE FREE 10 ML: 5 INJECTION INTRAVENOUS at 21:32

## 2022-10-26 RX ADMIN — HYDRALAZINE HYDROCHLORIDE 10 MG: 20 INJECTION INTRAMUSCULAR; INTRAVENOUS at 14:06

## 2022-10-26 RX ADMIN — GUAIFENESIN 600 MG: 600 TABLET ORAL at 21:00

## 2022-10-26 RX ADMIN — GUAIFENESIN 100 MG: 100 SOLUTION ORAL at 20:06

## 2022-10-26 RX ADMIN — GUAIFENESIN 100 MG: 100 SOLUTION ORAL at 03:44

## 2022-10-26 RX ADMIN — SODIUM CHLORIDE, PRESERVATIVE FREE 10 ML: 5 INJECTION INTRAVENOUS at 16:07

## 2022-10-26 RX ADMIN — ENOXAPARIN SODIUM 40 MG: 100 INJECTION SUBCUTANEOUS at 08:27

## 2022-10-26 RX ADMIN — GUAIFENESIN 100 MG: 100 SOLUTION ORAL at 16:02

## 2022-10-26 RX ADMIN — PIPERACILLIN AND TAZOBACTAM 4.5 G: 4; .5 INJECTION, POWDER, FOR SOLUTION INTRAVENOUS; PARENTERAL at 16:02

## 2022-10-26 RX ADMIN — AZITHROMYCIN DIHYDRATE 500 MG: 500 INJECTION, POWDER, LYOPHILIZED, FOR SOLUTION INTRAVENOUS at 05:09

## 2022-10-26 RX ADMIN — ACETAMINOPHEN 650 MG: 325 TABLET ORAL at 14:06

## 2022-10-26 RX ADMIN — GUAIFENESIN 100 MG: 100 SOLUTION ORAL at 12:13

## 2022-10-26 RX ADMIN — SODIUM CHLORIDE, PRESERVATIVE FREE 10 ML: 5 INJECTION INTRAVENOUS at 05:09

## 2022-10-26 RX ADMIN — INSULIN LISPRO 2 UNITS: 100 INJECTION, SOLUTION INTRAVENOUS; SUBCUTANEOUS at 08:27

## 2022-10-26 RX ADMIN — ACETAMINOPHEN 650 MG: 325 TABLET ORAL at 20:05

## 2022-10-26 RX ADMIN — PIPERACILLIN AND TAZOBACTAM 3.38 G: 3; .375 INJECTION, POWDER, FOR SOLUTION INTRAVENOUS at 21:31

## 2022-10-26 RX ADMIN — VANCOMYCIN HYDROCHLORIDE 2000 MG: 1 INJECTION, POWDER, LYOPHILIZED, FOR SOLUTION INTRAVENOUS at 16:21

## 2022-10-26 RX ADMIN — ACETAMINOPHEN 650 MG: 325 TABLET ORAL at 04:39

## 2022-10-26 RX ADMIN — TAMSULOSIN HYDROCHLORIDE 0.4 MG: 0.4 CAPSULE ORAL at 18:32

## 2022-10-26 RX ADMIN — INSULIN LISPRO 2 UNITS: 100 INJECTION, SOLUTION INTRAVENOUS; SUBCUTANEOUS at 21:31

## 2022-10-26 RX ADMIN — GUAIFENESIN 600 MG: 600 TABLET ORAL at 08:27

## 2022-10-26 RX ADMIN — CARVEDILOL 6.25 MG: 6.25 TABLET, FILM COATED ORAL at 16:02

## 2022-10-26 NOTE — PROGRESS NOTES
0700- shift report received, care of the patient assumed    0745- breakfast provided, vitals obtained    0831- am medications administered, ice water providied    1213- ssi and prn cough medication administered    1400- patient complaining of chest pain, temp 100.8, bp elevated to 191/81, , hydralazine adminsitered, tylenol administrered, patinet in pleasant spirits, joking with family in room    1445- vitals obtained, improvement noted, still in good spirits, NP hawa on the way to see the patient and the family.     1602- scheduled medication administered, linens changed, patient up in chair    1837- scheduled medication administered

## 2022-10-26 NOTE — PROGRESS NOTES
1900 - Assumed care of pt, shift report given    2000 - VSS. Assessment completed. LS diminished with scattered wheezing. Pt currently on 1L NC. Denies any pain at this time. CBWR    2140 - HS medication and snack given. 4U SSI given for blood glucose 201. Fresh ice water given    2235 - Urinal emptied dark yellow urine. Antibiotic completed, PIV flushed, locked and capped. 0006 - VSS. Pt sitting on side of bed. Spo2 97% RA. Pt denies any pain, discomfort or needs at this time. CBWR     0200 - Rounded on pt, resting with eyes closed, appears to be asleep. NAD noted. CBWR     0443- VS obtained. Oral tempt 101. /87. PRN tylenol given for temp, will recheck BP in 30 minutes. Urinal emptied yellow urine. Weight obtained. Lab in for blood draw. CBWR     0517 - Oral temp 101.8, cool wash cloths applied to head and back of neck. /80, will recheck again before giving prn as it is coming down on it's own. Scheduled antibiotic hung per orders. 9001 - Oral temp 99.5 /72 spo2 95% RA. Pt sitting up on side of bed, snack given per orders. No further needs voiced at this time.  CBWR

## 2022-10-26 NOTE — PROGRESS NOTES
Problem: Falls - Risk of  Goal: *Absence of Falls  Description: Document [place] Fail Fall Risk and appropriate interventions in the flowsheet. Outcome: Progressing Towards Goal  Note: Fall Risk Interventions:            Medication Interventions: Teach patient to arise slowly                   Problem: Patient Education: Go to Patient Education Activity  Goal: Patient/Family Education  Outcome: Progressing Towards Goal     Problem:  Activity Intolerance  Goal: *Oxygen saturation during activity within specified parameters  Outcome: Progressing Towards Goal  Goal: *Able to remain out of bed as prescribed  Outcome: Progressing Towards Goal     Problem: Patient Education: Go to Patient Education Activity  Goal: Patient/Family Education  Outcome: Progressing Towards Goal     Problem: Diabetes Maintenance:Admission  Goal: Activity/Safety  Outcome: Progressing Towards Goal  Goal: Diagnostic Tests/Procedures  Outcome: Progressing Towards Goal  Goal: Nutrition  Outcome: Progressing Towards Goal  Goal: Medications  Outcome: Progressing Towards Goal  Goal: Treatments/Interventions/Procedures  Outcome: Progressing Towards Goal     Problem: Diabetes Maintenance:Ongoing  Goal: Activity/Safety  Outcome: Progressing Towards Goal  Goal: Nutrition  Outcome: Progressing Towards Goal  Goal: Medications  Outcome: Progressing Towards Goal  Goal: Treatments/Interventsions/Procedures  Outcome: Progressing Towards Goal  Goal: *Blood Glucose 80 to 180 md/dl  Outcome: Progressing Towards Goal     Problem: Diabetes Maintenance:Discharge Outcomes  Goal: *Describes follow-up/return visits to physicians  Outcome: Progressing Towards Goal  Goal: *Blood glucose at patient's target range or approaching  Outcome: Progressing Towards Goal  Goal: *Aware of nutrition guidelines  Outcome: Progressing Towards Goal  Goal: *Verbalizes information about medication  Description: Verbalizes name, dosage, time, side effects, and number of days to  continue medications. Outcome: Progressing Towards Goal  Goal: *Describes goals, rules, symptoms, and treatments  Description: Describes blood glucose goals, monitoring, sick day rules,  hypo/hyperglycemia prevention, symptoms, and treatment  Outcome: Progressing Towards Goal  Goal: *Describes available outpatient diabetes resources and support systems  Outcome: Progressing Towards Goal     Problem: Airway Clearance - Ineffective  Goal: *Patent airway  Outcome: Progressing Towards Goal  Goal: *Absence of airway secretions  Outcome: Progressing Towards Goal  Goal: *Able to cough effectively  Outcome: Progressing Towards Goal     Problem: Patient Education: Go to Patient Education Activity  Goal: Patient/Family Education  Outcome: Progressing Towards Goal     Problem: Risk for Spread of Infection  Goal: Prevent transmission of infectious organism to others  Description: Prevent the transmission of infectious organisms to other patients, staff members, and visitors.   Outcome: Progressing Towards Goal     Problem: Patient Education:  Go to Education Activity  Goal: Patient/Family Education  Outcome: Progressing Towards Goal

## 2022-10-26 NOTE — PROGRESS NOTES
Progress Note    Patient: Cipriano Nichols MRN: [id number]  SSN: [id number]    YOB: 1960  Age: 58 y.o. Sex: male      Admit Date: 10/24/2022    LOS: 2 days     Subjective:   -Seen for f/up on sepsis/PNA/Pulmonary lung nodules.  -Noted sitting up in bed, on room air in no acute distress.  -Pt endorsed +ve productive cough with clear phlegm, chest discomfort \"from coughing\" and headaches \"from coughing\". No worsening shortness of breath. No fevers or chills. No nausea, vomit, abdominal pain or discomfort. No other complaints verbalized. States \"I feel a lot better\". Objective report:  Tmax-last 24hr 101F, still tachycardic with wbc 26. Objective:     Vitals:    10/26/22 0758 10/26/22 0812 10/26/22 1145 10/26/22 1200   BP:  (!) 156/77 (!) 157/81    Pulse: (!) 114 (!) 113 (!) 114 (!) 115   Resp:  18 17    Temp:  98.4 °F (36.9 °C) 97.5 °F (36.4 °C)    SpO2:  100% 91%    Weight:       Height:            Intake and Output:  Current Shift: No intake/output data recorded. Last three shifts: 10/24 1901 - 10/26 0700  In: 1912 [P.O.:1662; I.V.:250]  Out: 2225 [Urine:2225]    Physical Exam:   Physical Exam  Vitals and nursing note reviewed. Constitutional:       Appearance: Normal appearance. He is normal weight. Comments: Not ill or toxic appearing, noted sitting up in bed, coughing, on room air. HENT:      Head: Normocephalic and atraumatic. Nose: Nose normal.      Mouth/Throat:      Mouth: Mucous membranes are moist.   Eyes:      Extraocular Movements: Extraocular movements intact. Pupils: Pupils are equal, round, and reactive to light. Cardiovascular:      Rate and Rhythm: Regular rhythm. Tachycardia present. Pulses: Normal pulses. Heart sounds: Normal heart sounds. Pulmonary:      Effort: Pulmonary effort is normal.      Breath sounds: Wheezing present. Comments: Diminished breath sounds with Mild expiratory wheeze. No crackles.    Abdominal:      General: Bowel sounds are normal.      Palpations: Abdomen is soft. Musculoskeletal:         General: Normal range of motion. Cervical back: Normal range of motion and neck supple. Skin:     General: Skin is warm and dry. Capillary Refill: Capillary refill takes less than 2 seconds. Neurological:      General: No focal deficit present. Mental Status: He is alert and oriented to person, place, and time. Mental status is at baseline.    Psychiatric:         Mood and Affect: Mood normal.         Behavior: Behavior normal.        Lab/Data Review:  Recent Results (from the past 24 hour(s))   GLUCOSE, POC    Collection Time: 10/25/22  3:50 PM   Result Value Ref Range    Glucose (POC) 163 (H) 70 - 110 mg/dL    Performed by Symone Cabrera    GLUCOSE, POC    Collection Time: 10/25/22  9:14 PM   Result Value Ref Range    Glucose (POC) 201 (H) 70 - 110 mg/dL    Performed by Britney Morse    METABOLIC PANEL, BASIC    Collection Time: 10/26/22  4:52 AM   Result Value Ref Range    Sodium 133 (L) 136 - 145 mmol/L    Potassium 4.9 3.5 - 5.5 mmol/L    Chloride 98 (L) 100 - 111 mmol/L    CO2 27 21 - 32 mmol/L    Anion gap 8 3.0 - 18.0 mmol/L    Glucose 150 (H) 74 - 99 mg/dL    BUN 8 7 - 18 mg/dL    Creatinine 0.79 0.60 - 1.30 mg/dL    BUN/Creatinine ratio 10 (L) 12 - 20      eGFR >60 >60 ml/min/1.73m2    Calcium 9.3 8.5 - 10.1 mg/dL   MAGNESIUM    Collection Time: 10/26/22  4:52 AM   Result Value Ref Range    Magnesium 1.8 1.6 - 2.6 mg/dL   CBC W/O DIFF    Collection Time: 10/26/22  4:52 AM   Result Value Ref Range    WBC 26.1 (H) 4.6 - 13.2 K/uL    RBC 3.94 (L) 4.35 - 5.65 M/uL    HGB 10.3 (L) 13.0 - 16.0 g/dL    HCT 32.3 (L) 36.0 - 48.0 %    MCV 82.0 78.0 - 100.0 FL    MCH 26.1 24.0 - 34.0 PG    MCHC 31.9 31.0 - 37.0 g/dL    RDW 15.1 (H) 11.6 - 14.5 %    PLATELET 201 139 - 779 K/uL    MPV 10.5 9.2 - 11.8 FL    NRBC 0.0 0.0  WBC    ABSOLUTE NRBC 0.00 0.00 - 0.01 K/uL   GLUCOSE, POC    Collection Time: 10/26/22  7:33 AM   Result Value Ref Range    Glucose (POC) 198 (H) 70 - 110 mg/dL    Performed by [address] [place] [address], POC    Collection Time: 10/26/22  7:46 AM   Result Value Ref Range    Glucose (POC) 191 (H) 70 - 110 mg/dL    Performed by Mayelin Cart    GLUCOSE, POC    Collection Time: 10/26/22 11:16 AM   Result Value Ref Range    Glucose (POC) 194 (H) 70 - 110 mg/dL    Performed by Mayelin Cart          Assessment:     Principal Problem:    Sepsis ([address] (10/24/2022)    Active Problems:    Acute respiratory failure with hypoxia ([address] (10/24/2022)      Hypokalemia (10/24/2022)      Primary hypertension (10/24/2022)      Respiratory failure ([address] (10/24/2022)      Pneumonia (10/24/2022)      Plan:     #1: Acute respiratory failure with hypoxia ([address]  -unclear etiology,   -improved. Now on room air.  -rapid covid test 10/24/22--negative   -ddimer 6.4--Vq scan neg.   -admit CXR:nodular bilateral parenchymal infiltrates are present  -admit CTA Chest 10/24/22-Interval development of numerous bilateral lung parenchymal nodules, with largest nodule right lung apex maximally 2.8 cm with air bronchograms. Additional several nodules have irregular margins rather than typical smoothly marginated nodules as seen in metastatic disease. Small cavitated nodule left lung. Interval development of mediastinal and bilateral hilar adenopathy,   -IGRA and ACE inhibitor test ordered by ED physician, for possible sarcoidosis. TB spot test is pending.    -??sarcoidosis Vs TB vs Lung Ca Vs postobstructive pneumonia.  -call placed to transfer for pulmonology access, spoke with Dr. Augustine Hernandes, states clinical picture seems more like an infectious/inflammatory etiology, states he wouldn't do a bronchoscopy at this time, allow infection to resolve, treat with IV antibiotics, and recommend f/up CT scan in 3-6 months.   -per care everywhere, pt had a recent urogram for microscopic hematuria, on 5/17/22--where he was first noted to have New nodular densities in both lungs, potentially suggestive of metastatic lesions vs. nonspecific postinflammatory/postinfectious changes. #2: Sepsis:  -POA, suspect 2/2 pneumonia  -SIRS criteria met: patient febrile, tachycardic, and elevated WBC 28.4  -lactic was 1.6, procal 1.63 on admit  -Tmax 101 last 24 hrs, still tachycardic, wbc-26--<28.4  -switch antibiotics to vanc and zosyn for a much broader coverage.  -cont O2 supp prn to keep sats> 92%  -cont albuterol prn.  -start [email or web address]--  -follow blood cultures. -CBC, CMP daily    #3: Hypokalemia:  -resolved. #4: Hypertensive urgency:  -chronic issue, improving  -Bps not at goal. Cont norvasc.  -add lisinopril 10mg daily, add carvedilol 6.25mg BID  -Hydralazine prn. #5: Diabetes Mellitus Type 2:  -stable, continue sliding scale and accu-checks prior to meals and bedtime. #6: BPH;  -chronic issue, continue Flomax     #7: Tobacco Use:  -smoking cessation education              Code Status: Full  Prophylaxis:  Lovenox Sq  Total time spent: 35 minutes  Plan of care discussed with patient and nursing staff.           Signed By: Livier Mishra NP     October 26, 2022

## 2022-10-26 NOTE — PROGRESS NOTES
Day #1 of 5  Indication:    Pneumonia (CAP)    Sepsis of Unknown Etiology      Current regimen:  Zosyn 4.5 g x 1 followed by 3.375 g q 8 h (240 min infusion time)  Abx regimen: Prev. On Zithromax & Ceftriaxone, now d/c; Vanc. started  Recent Labs     10/26/22  0452 10/25/22  0515 10/24/22  1355   WBC 26.1* 26.7* 28.4*   CREA 0.79 0.82 0.86   BUN 8 7 8     Est CrCl: 108 ml/min; Temp (24hrs), Av.5 °F (37.5 °C), Min:97.5 °F (36.4 °C), Max:101.8 °F (38.8 °C)    Cultures: No growth after 16 hours     Plan: Per Sheldon Olmos NP:  SIRS criteria met: patient febrile, tachycardic, and elevated WBC 28.4  -lactic was 1.6, procal 1.63 on admit  -Tmax 101 last 24 hrs, still tachycardic, wbc-26--<28.4  -switch antibiotics to vanc and zosyn for a much broader coverage.

## 2022-10-26 NOTE — PROGRESS NOTES
Problem: Falls - Risk of  Goal: *Absence of Falls  Description: Document Rosemary Ramos Fall Risk and appropriate interventions in the flowsheet. Outcome: Progressing Towards Goal  Note: Fall Risk Interventions:            Medication Interventions: Teach patient to arise slowly                   Problem:  Activity Intolerance  Goal: *Oxygen saturation during activity within specified parameters  Outcome: Progressing Towards Goal     Problem: Diabetes Maintenance:Ongoing  Goal: Activity/Safety  Outcome: Progressing Towards Goal  Goal: Nutrition  Outcome: Progressing Towards Goal  Goal: Medications  Outcome: Progressing Towards Goal     Problem: Airway Clearance - Ineffective  Goal: *Patent airway  Outcome: Progressing Towards Goal  Goal: *Able to cough effectively  Outcome: Progressing Towards Goal

## 2022-10-26 NOTE — PROGRESS NOTES
Pharmacy Dosing Services: Vancomycin    Indication: Pneumonia (CAP)    Day of therapy:     Other Antimicrobials (Include dose, start day & day of therapy):  Piperacillin/Tazobactam (Zosyn)  Current Antimicrobial Therapy (168h ago, onward)       Ordered     Start Stop    10/26/22 1513  VANCOMYCIN TROUGH DUE ON 10/28/2022 AT 0400  Other,   ONCE        References:    Lexicomp    10/28/22 0000 10/28/22 1159    10/26/22 1513  vancomycin (VANCOCIN) 1,000 mg in 0.9% sodium chloride 250 mL ([id number])  1,000 mg,   IntraVENous,   EVERY 12 HOURS        References:    Lexicomp    10/27/22 0500 22 0459    10/26/22 1450  piperacillin-tazobactam (ZOSYN) 3.375 g in 0.9% sodium chloride (MBP/ADV) 100 mL MBP  3.375 g,   IntraVENous,   EVERY 8 HOURS        References:    Lexicomp    10/26/22 2200 10/31/22 2159    10/26/22 1450  vancomycin (VANCOCIN) 2,000 mg in 0.9% sodium chloride 500 mL IVPB  2,000 mg,   IntraVENous,   ONCE        References:    Lexicomp    10/26/22 1700 10/27/22 0459    10/26/22 1453  piperacillin-tazobactam (ZOSYN) 4.5 g in 0.9% sodium chloride (MBP/ADV) 100 mL MBP  4.5 g,   IntraVENous,   ONCE        References:    Lexicomp    10/26/22 1600 10/27/22 0359    10/26/22 1513  VANCOMYCIN INFORMATION NOTE 1 Each  1 Each,   Other,   RX DOSING/MONITORING        References:    Lexicomp    10/26/22 1512 --                    Loading dose (date given): 2000 mg  Current Maintenance dose: 1000 mg IV every 12 hours    Goal Vancomycin Level: AUC24 (range): 400-600 mg/L.hr    Vancomycin Level (if drawn): No results for input(s): [place] in the last 72 hours.       Pt Weight Weight: 97.8 kg (215 lb 8 oz)   Temperature Temp: 99.9 °F (37.7 °C)   Temp (72hrs), Av.5 °F (37.5 °C), Min:96.6 °F (35.9 °C), Max:103 °F (39.4 °C)     HR Pulse (Heart Rate): (!) 121     BP BP: (!) 160/69       Recent Labs     10/26/22  0452 10/25/22  0515 10/24/22  1355   CREA 0.79 0.82 0.86   BUN 8 7 8   WBC 26.1* 26.7* 28.4*     Estimated Creatinine Clearance Estimated Creatinine Clearance: 108.1 mL/min (based on SCr of 0.79 mg/dL). Estimated Creatinine Clearance (using IBW): 90.6 mL/min     CAPD, Hemodialysis or Renal Replacement Therapy: N/A  Renal function stable? (unstable defined as SCr increase of 0.5 mg/dL or > 50% increase from baseline, whichever is greater) (Y/N): YES     Significant Cultures:   Results       Procedure Component Value Units Date/Time    QUANTIFERON-TB GOLD PLUS [[id number]] Collected: 10/24/22 1505    Order Status: Sent Specimen: Blood from Serum Updated: 10/25/22 1511    CULTURE, BLOOD [[id number]] Collected: 10/24/22 1420    Order Status: Completed Specimen: Blood Updated: 10/25/22 0725     Special Requests: No Special Requests        Culture result: No growth after 16 hours       CULTURE, BLOOD [[id number]] Collected: 10/24/22 1413    Order Status: Completed Specimen: Blood Updated: 10/25/22 0725     Special Requests: No Special Requests        Culture result: No growth after 16 hours       COVID-19 WITH INFLUENZA A/B [[id number]]     Order Status: Canceled Specimen: Nasopharyngeal     COVID-19 RAPID TEST [[id number]] Collected: 10/24/22 1355    Order Status: Completed Specimen: Nasopharyngeal Updated: 10/24/22 1455     COVID-19 rapid test Not Detected        Comment:   Rapid NAAT:  The specimen is NEGATIVE for SARS-CoV-2, the novel coronavirus associated with COVID-19. Negative results should be treated as presumptive and, if inconsistent with clinical signs and symptoms or necessary for patient management, should be tested with an alternative molecular assay. Negative results do not preclude SARS-CoV-2 infection and should not be used as the sole basis for patient management decisions. This test has been authorized by the FDA under an Emergency Use   Authorization (EUA) for use by authorized laboratories.  Fact sheet for Healthcare Providers: [employer] Fact sheet for Patients: [facility]   Methodology: Isothermal Nucleic Acid Amplification         INFLUENZA A & B AG (RAPID TEST) [[id number]] Collected: 10/24/22 1355    Order Status: Completed Specimen: Nasopharyngeal from Nasal washing Updated: 10/24/22 1500     Influenza A Antigen Negative        Influenza B Antigen Negative                 Regimen assessment: CONSULTED FOR DOSING OF VANCOMYCIN FOR CAP FOR A TOTAL DURATION OF 5 DAYS  Maintenance dose: 1000 mg IV every 12 hours  Next scheduled level: 10/28/22 AT 0400           Pharmacy will follow daily and adjust medications as appropriate for renal function and/or serum levels.     Thank you,  Blair Garcia  Pharmacist  [phone]

## 2022-10-27 LAB
ANION GAP SERPL CALC-SCNC: 10 MMOL/L (ref 3–18)
BASOPHILS # BLD: 0 K/UL (ref 0–0.1)
BASOPHILS NFR BLD: 0 % (ref 0–2)
BUN SERPL-MCNC: 9 MG/DL (ref 7–18)
BUN/CREAT SERPL: 12 (ref 12–20)
CA-I BLD-MCNC: 8.8 MG/DL (ref 8.5–10.1)
CHLORIDE SERPL-SCNC: 102 MMOL/L (ref 100–111)
CO2 SERPL-SCNC: 27 MMOL/L (ref 21–32)
CREAT SERPL-MCNC: 0.75 MG/DL (ref 0.6–1.3)
DIFFERENTIAL METHOD BLD: ABNORMAL
EOSINOPHIL # BLD: 0 K/UL (ref 0–0.4)
EOSINOPHIL NFR BLD: 0 % (ref 0–5)
ERYTHROCYTE [DISTWIDTH] IN BLOOD BY AUTOMATED COUNT: 15.1 % (ref 11.6–14.5)
GLUCOSE BLD STRIP.AUTO-MCNC: 142 MG/DL (ref 70–110)
GLUCOSE BLD STRIP.AUTO-MCNC: 193 MG/DL (ref 70–110)
GLUCOSE BLD STRIP.AUTO-MCNC: 224 MG/DL (ref 70–110)
GLUCOSE BLD STRIP.AUTO-MCNC: 279 MG/DL (ref 70–110)
GLUCOSE SERPL-MCNC: 173 MG/DL (ref 74–99)
HCT VFR BLD AUTO: 27.7 % (ref 36–48)
HGB BLD-MCNC: 9 G/DL (ref 13–16)
IMM GRANULOCYTES # BLD AUTO: 0 K/UL
IMM GRANULOCYTES NFR BLD AUTO: 0 %
LYMPHOCYTES # BLD: 1.9 K/UL (ref 0.9–3.6)
LYMPHOCYTES NFR BLD: 8 % (ref 21–52)
MAGNESIUM SERPL-MCNC: 1.7 MG/DL (ref 1.6–2.6)
MCH RBC QN AUTO: 26.2 PG (ref 24–34)
MCHC RBC AUTO-ENTMCNC: 32.5 G/DL (ref 31–37)
MCV RBC AUTO: 80.5 FL (ref 78–100)
MONOCYTES # BLD: 2.6 K/UL (ref 0.05–1.2)
MONOCYTES NFR BLD: 11 % (ref 3–10)
NEUTS BAND NFR BLD MANUAL: 1 % (ref 0–5)
NEUTS SEG # BLD: 19.2 K/UL (ref 1.8–8)
NEUTS SEG NFR BLD: 80 % (ref 40–73)
NRBC # BLD: 0 K/UL (ref 0–0.01)
NRBC BLD-RTO: 0 PER 100 WBC
PERFORMED BY, TECHID: ABNORMAL
PLATELET # BLD AUTO: 262 K/UL (ref 135–420)
PMV BLD AUTO: 10.1 FL (ref 9.2–11.8)
POTASSIUM SERPL-SCNC: 2.8 MMOL/L (ref 3.5–5.5)
RBC # BLD AUTO: 3.44 M/UL (ref 4.35–5.65)
RBC MORPH BLD: ABNORMAL
SODIUM SERPL-SCNC: 139 MMOL/L (ref 136–145)
WBC # BLD AUTO: 23.7 K/UL (ref 4.6–13.2)

## 2022-10-27 PROCEDURE — 36415 COLL VENOUS BLD VENIPUNCTURE: CPT

## 2022-10-27 PROCEDURE — 83735 ASSAY OF MAGNESIUM: CPT

## 2022-10-27 PROCEDURE — 85025 COMPLETE CBC W/AUTO DIFF WBC: CPT

## 2022-10-27 PROCEDURE — 77010033678 HC OXYGEN DAILY

## 2022-10-27 PROCEDURE — 74011250636 HC RX REV CODE- 250/636: Performed by: NURSE PRACTITIONER

## 2022-10-27 PROCEDURE — 74011636637 HC RX REV CODE- 636/637: Performed by: NURSE PRACTITIONER

## 2022-10-27 PROCEDURE — 74011250637 HC RX REV CODE- 250/637: Performed by: NURSE PRACTITIONER

## 2022-10-27 PROCEDURE — 94761 N-INVAS EAR/PLS OXIMETRY MLT: CPT

## 2022-10-27 PROCEDURE — 74011000250 HC RX REV CODE- 250: Performed by: NURSE PRACTITIONER

## 2022-10-27 PROCEDURE — 87116 MYCOBACTERIA CULTURE: CPT

## 2022-10-27 PROCEDURE — 74011000258 HC RX REV CODE- 258: Performed by: NURSE PRACTITIONER

## 2022-10-27 PROCEDURE — 82962 GLUCOSE BLOOD TEST: CPT

## 2022-10-27 PROCEDURE — 80048 BASIC METABOLIC PNL TOTAL CA: CPT

## 2022-10-27 PROCEDURE — 65270000029 HC RM PRIVATE

## 2022-10-27 RX ORDER — POTASSIUM CHLORIDE 7.45 MG/ML
10 INJECTION INTRAVENOUS
Status: COMPLETED | OUTPATIENT
Start: 2022-10-27 | End: 2022-10-27

## 2022-10-27 RX ORDER — MAGNESIUM SULFATE HEPTAHYDRATE 40 MG/ML
2 INJECTION, SOLUTION INTRAVENOUS ONCE
Status: COMPLETED | OUTPATIENT
Start: 2022-10-27 | End: 2022-10-27

## 2022-10-27 RX ADMIN — GUAIFENESIN 600 MG: 600 TABLET ORAL at 22:06

## 2022-10-27 RX ADMIN — SODIUM CHLORIDE, PRESERVATIVE FREE 10 ML: 5 INJECTION INTRAVENOUS at 15:39

## 2022-10-27 RX ADMIN — INSULIN LISPRO 4 UNITS: 100 INJECTION, SOLUTION INTRAVENOUS; SUBCUTANEOUS at 09:04

## 2022-10-27 RX ADMIN — CARVEDILOL 6.25 MG: 6.25 TABLET, FILM COATED ORAL at 17:43

## 2022-10-27 RX ADMIN — SODIUM CHLORIDE 75 ML/HR: 9 INJECTION, SOLUTION INTRAVENOUS at 04:41

## 2022-10-27 RX ADMIN — ACETAMINOPHEN 650 MG: 325 TABLET ORAL at 04:35

## 2022-10-27 RX ADMIN — CARVEDILOL 6.25 MG: 6.25 TABLET, FILM COATED ORAL at 09:05

## 2022-10-27 RX ADMIN — ENOXAPARIN SODIUM 40 MG: 100 INJECTION SUBCUTANEOUS at 09:05

## 2022-10-27 RX ADMIN — MAGNESIUM SULFATE HEPTAHYDRATE 2 G: 40 INJECTION, SOLUTION INTRAVENOUS at 14:31

## 2022-10-27 RX ADMIN — LISINOPRIL 10 MG: 5 TABLET ORAL at 09:04

## 2022-10-27 RX ADMIN — PIPERACILLIN AND TAZOBACTAM 3.38 G: 3; .375 INJECTION, POWDER, FOR SOLUTION INTRAVENOUS at 14:12

## 2022-10-27 RX ADMIN — TAMSULOSIN HYDROCHLORIDE 0.4 MG: 0.4 CAPSULE ORAL at 17:44

## 2022-10-27 RX ADMIN — INSULIN LISPRO 2 UNITS: 100 INJECTION, SOLUTION INTRAVENOUS; SUBCUTANEOUS at 22:06

## 2022-10-27 RX ADMIN — POTASSIUM CHLORIDE 10 MEQ: 7.46 INJECTION, SOLUTION INTRAVENOUS at 14:13

## 2022-10-27 RX ADMIN — POTASSIUM CHLORIDE 10 MEQ: 7.46 INJECTION, SOLUTION INTRAVENOUS at 14:19

## 2022-10-27 RX ADMIN — AMLODIPINE BESYLATE 10 MG: 5 TABLET ORAL at 09:05

## 2022-10-27 RX ADMIN — INSULIN LISPRO 6 UNITS: 100 INJECTION, SOLUTION INTRAVENOUS; SUBCUTANEOUS at 17:42

## 2022-10-27 RX ADMIN — SODIUM CHLORIDE 75 ML/HR: 9 INJECTION, SOLUTION INTRAVENOUS at 14:30

## 2022-10-27 RX ADMIN — HYDRALAZINE HYDROCHLORIDE 10 MG: 20 INJECTION INTRAMUSCULAR; INTRAVENOUS at 04:41

## 2022-10-27 RX ADMIN — VANCOMYCIN HYDROCHLORIDE 1000 MG: 1 INJECTION, POWDER, LYOPHILIZED, FOR SOLUTION INTRAVENOUS at 04:25

## 2022-10-27 RX ADMIN — POTASSIUM CHLORIDE 10 MEQ: 7.46 INJECTION, SOLUTION INTRAVENOUS at 15:39

## 2022-10-27 RX ADMIN — GUAIFENESIN 600 MG: 600 TABLET ORAL at 09:05

## 2022-10-27 RX ADMIN — PIPERACILLIN AND TAZOBACTAM 3.38 G: 3; .375 INJECTION, POWDER, FOR SOLUTION INTRAVENOUS at 22:06

## 2022-10-27 RX ADMIN — SODIUM CHLORIDE, PRESERVATIVE FREE 10 ML: 5 INJECTION INTRAVENOUS at 22:06

## 2022-10-27 RX ADMIN — VANCOMYCIN HYDROCHLORIDE 1000 MG: 1 INJECTION, POWDER, LYOPHILIZED, FOR SOLUTION INTRAVENOUS at 17:44

## 2022-10-27 RX ADMIN — PIPERACILLIN AND TAZOBACTAM 3.38 G: 3; .375 INJECTION, POWDER, FOR SOLUTION INTRAVENOUS at 06:04

## 2022-10-27 NOTE — PROGRESS NOTES
10/27/22 0708   Critical Result Types   Type of Critical Result Laboratory   Critical Lab Result Types   Critical Lab Value Other  (K+ 2.8)   Notification Information   Notified By (Name) Mary Rockwell   Time of Critical Result Notification 0708   Verbal Readback Provided Yes   Provider Notification   Was Provider Notified Yes   Name of Provider JOE Jimenez NP   Provider Gave Verbal Readback Yes   Time Provider Notified 5310   Date Provider Notified 10/27/22   Were Orders Received No

## 2022-10-27 NOTE — PROGRESS NOTES
Hospitalist Progress Note    Daily Progress Note: 10/27/2022 12:56 PM      Chandu Hernandez                                            MRN: [id number]                                  :1960      Subjective:     Pt examined and seen at bedside. Patient alert and oriented x4, there are no acute signs and symptoms of noted distress. Patient reports that he is feeling better, he denies fever, chills, shortness of breath, but continues to have a productive cough producing a whitish colored sputum. No acute events reported overnight. Labs and Vitals: Continue to await Quantiferon results, added AFB smear and culture. CBC BMP daily  Today's Plan: We will continue broad-spectrum antibiotics, continue with Zosyn and vancomycin, patient WBC improving, but improving slowly. Objective:     Visit Vitals  BP (!) 152/73   Pulse (!) 104   Temp 99.5 °F (37.5 °C)   Resp 20   Ht 5' 7\" (1.702 m)   Wt 97.8 kg (215 lb 8 oz)   SpO2 93%   BMI 33.75 kg/m²    O2 Flow Rate (L/min): (S) 1 l/min (BACK TO 1 LITER PT HAD TAKEN OFF FOR PAST HOUR PER HIM) O2 Device: Nasal cannula (1L)    Temp (24hrs), Av.8 °F (37.7 °C), Min:97.4 °F (36.3 °C), Max:100.8 °F (38.2 °C)      No intake/output data recorded. 10/25 1901 - 10/27 0700  In: 3223 [P.O.:3020; I.V.:1400]  Out: 2775 [Urine:2775]    PHYSICAL EXAM:  Physical Exam  Vitals and nursing note reviewed. Constitutional:       Appearance: Normal appearance. She is normal weight. HENT:      Head: Normocephalic and atraumatic. Mouth/Throat:      Mouth: Mucous membranes are moist.   Eyes:      Extraocular Movements: Extraocular movements intact. Pupils: Pupils are equal, round, and reactive to light. Cardiovascular:      Rate and Rhythm: Normal rate and regular rhythm. Pulses: Normal pulses. Heart sounds: Normal heart sounds. Pulmonary:      Effort: Pulmonary effort is normal.      Breath sounds: Diminished lungs in bilateral bases. .   Abdominal: General: Abdomen is flat. Bowel sounds are normal.      Palpations: Abdomen is soft. Musculoskeletal:      Cervical back: Normal range of motion and neck supple. Skin:     General: Skin is warm and dry. Capillary Refill: Capillary refill takes less than 2 seconds. Neurological:      General: No focal deficit present. Mental Status: She is alert and oriented to person, place, and time.    Psychiatric:         Mood and Affect: Mood normal.     Current Facility-Administered Medications   Medication Dose Route Frequency    magnesium sulfate 2 g/50 ml IVPB (premix or compounded)  2 g IntraVENous ONCE    0.9% sodium chloride infusion  75 mL/hr IntraVENous CONTINUOUS    lisinopriL (PRINIVIL, ZESTRIL) tablet 10 mg  10 mg Oral DAILY    carvediloL (COREG) tablet 6.25 mg  6.25 mg Oral BID WITH MEALS    piperacillin-tazobactam (ZOSYN) 3.375 g in 0.9% sodium chloride (MBP/ADV) 100 mL MBP  3.375 g IntraVENous Q8H    vancomycin (VANCOCIN) 1,000 mg in 0.9% sodium chloride 250 mL ([id number])  1,000 mg IntraVENous Q12H    VANCOMYCIN INFORMATION NOTE 1 Each  1 Each Other Rx Dosing/Monitoring    [START ON 10/28/2022] VANCOMYCIN TROUGH DUE ON 10/28/2022 AT 0400   Other ONCE    guaiFENesin (ROBITUSSIN) 100 mg/5 mL oral liquid 100 mg  100 mg Oral Q4H PRN    sodium chloride (NS) flush 5-40 mL  5-40 mL IntraVENous Q8H    sodium chloride (NS) flush 5-40 mL  5-40 mL IntraVENous PRN    acetaminophen (TYLENOL) tablet 650 mg  650 mg Oral Q6H PRN    Or    acetaminophen (TYLENOL) suppository 650 mg  650 mg Rectal Q6H PRN    polyethylene glycol (MIRALAX) packet 17 g  17 g Oral DAILY PRN    ondansetron (ZOFRAN ODT) tablet 4 mg  4 mg Oral Q8H PRN    Or    ondansetron (ZOFRAN) injection 4 mg  4 mg IntraVENous Q6H PRN    enoxaparin (LOVENOX) injection 40 mg  40 mg SubCUTAneous DAILY    amLODIPine (NORVASC) tablet 10 mg  10 mg Oral DAILY    tamsulosin (FLOMAX) capsule 0.4 mg  0.4 mg Oral QPM    hydrALAZINE (APRESOLINE) 20 mg/mL injection 10 mg  10 mg IntraVENous Q6H PRN    guaiFENesin ER (MUCINEX) tablet 600 mg  600 mg Oral Q12H    albuterol (PROVENTIL HFA, VENTOLIN HFA, PROAIR HFA) inhaler 2 Puff  2 Puff Inhalation Q4H PRN    glucose chewable tablet 16 g  4 Tablet Oral PRN    glucagon (GLUCAGEN) injection 1 mg  1 mg IntraMUSCular PRN    insulin lispro (HUMALOG) injection   SubCUTAneous AC&HS        Assessment/Plan:     Acute respiratory failure with hypoxia (HCC)  -improving, patient stable on 1 LPM via NC  -Covid negative  -CXR:nodular bilateral parenchymal infiltrates are present  -CT Chest: Interval development of numerous bilateral lung parenchymal nodules, with largest nodule right lung apex maximally 2.8 cm with air bronchograms, patient reports that he was told he had lung nodules about 3 months ago, but never had a follow-up, patient advised he need to have his follow-up with his PCP   -keep O2 saturation greater than 92%  - attempted to transfer the patient for pulmonologist coverage for possible bronch, but pulmonologist states this related to inflammatory/infectious process and to continue antibiotics, which were broaden    Sepsis ([facility] [address]  -patient continues to be mildly tachycardic and WBC remains elevated, slightly improved from admission  -pracalcitonin: ordered  -patient continue to receive gentle hydration via IV   -antibiotics was broaden previous day, patient will continue on Vancomycin and Zosyn  -blood cultures no growth in 3 days  -CBC in am    -likely secondary pneumonia  -Quantiferon ordered by the ED provider and is in process, will collect x 3 AFB sputum  - attempted to transfer the patient for pulmonologist coverage for possible bronch, but pulmonologist states this related to inflammatory/infectious process and to continue antibiotics, which were broaden, patient currently on IV Vancomycin and Zosyn    Hypokalemia  -2.8, replaced with IV replacement, continue cardiac monitoring, repeat BMP in the am     Hypertension Urgency  -chronic, blood pressure has improved since starting Coreg and Lisinopril  -monitor BP closely     Diabetes Mellitus Type 2  -per patient diet control  -will implement sliding scale and accu-checks prior to meals and bedtime     BPH  -chronic, continue Flomax     Tobacco Abuse  -nicotine patch refused  -smoking cessation education     DVT Prophylaxis: Lovenox    Code Status: Full     Care Plan discussed with: Patient and nursing    Clinical time 25 minutes with >50% of visit spent in counseling and coordination of care    Signed by: LAMONTE Huerta 10/27/2022

## 2022-10-27 NOTE — PROGRESS NOTES
Problem: Falls - Risk of  Goal: *Absence of Falls  Description: Document Jeannine Ground Fall Risk and appropriate interventions in the flowsheet. Outcome: Progressing Towards Goal  Note: Fall Risk Interventions:            Medication Interventions: Teach patient to arise slowly                   Problem: Airway Clearance - Ineffective  Goal: *Patent airway  Outcome: Progressing Towards Goal  Goal: *Absence of airway secretions  Outcome: Progressing Towards Goal  Goal: *Able to cough effectively  Outcome: Progressing Towards Goal     Problem: Risk for Spread of Infection  Goal: Prevent transmission of infectious organism to others  Description: Prevent the transmission of infectious organisms to other patients, staff members, and visitors. Outcome: Progressing Towards Goal     Problem:  Activity Intolerance  Goal: *Oxygen saturation during activity within specified parameters  Outcome: Not Progressing Towards Goal  Goal: *Able to remain out of bed as prescribed  Outcome: Not Progressing Towards Goal

## 2022-10-27 NOTE — PROGRESS NOTES
Comprehensive Nutrition Assessment    Type and Reason for Visit: Initial    Nutrition Recommendations/Plan:   4CHO choice Low Na diet  Ensure Protein Max TID     Malnutrition Assessment:  Malnutrition Status:  Insufficient data (In isolation TB rule out) (10/27/22 1110)    Context:  Acute illness     Findings of the 6 clinical characteristics of malnutrition:   Energy Intake:  Mild decrease in energy intake (specify) (reduced appetite)  Weight Loss:  No significant weight loss     Body Fat Loss:  Unable to assess,     Muscle Mass Loss:  Unable to assess,    Fluid Accumulation:  Unable to assess,     Strength:  Not performed     Nutrition Assessment:    59 yo male PMH: HTN, BPH, DM    Nutrition Related Findings: Morbidly obese BMI 33.8 at 215 lbs. Pt with symptoms of weakness, fever, chills, cough, SOB. Tested positve for COVID 3 weeks ago but got better, but symptoms recently returned. Dx Sepsis 2/2 PNA started on IV Abx this is hospital day 3 pt is feeling better but still has cough continues on Abx. CXR also showed nodular bilateral parecnchnal infiltrates and CT showed numerous bilateral lung parenchneal Nodules. Pt was told about nodules back in July but never followed up. Currently poor intake 1-50% of meals Recommend add Ensure Protein max TID Wound Type: None  K+ 2.8 has not diarrhea being replaced IV.  Also starting ONS  BG elevated SSI and Diabetic diet    Recent Results (from the past 24 hour(s))   GLUCOSE, POC    Collection Time: 10/26/22  3:34 PM   Result Value Ref Range    Glucose (POC) 233 (H) 70 - 110 mg/dL    Performed by Adriel Acosta    GLUCOSE, POC    Collection Time: 10/26/22  9:20 PM   Result Value Ref Range    Glucose (POC) 179 (H) 70 - 110 mg/dL    Performed by Tolu Wiggins    CBC WITH AUTOMATED DIFF    Collection Time: 10/27/22  5:20 AM   Result Value Ref Range    WBC 23.7 (H) 4.6 - 13.2 K/uL    RBC 3.44 (L) 4.35 - 5.65 M/uL    HGB 9.0 (L) 13.0 - 16.0 g/dL    HCT 27.7 (L) 36.0 - 48.0 %    MCV 80.5 78.0 - 100.0 FL    MCH 26.2 24.0 - 34.0 PG    MCHC 32.5 31.0 - 37.0 g/dL    RDW 15.1 (H) 11.6 - 14.5 %    PLATELET 321 628 - 336 K/uL    MPV 10.1 9.2 - 11.8 FL    NRBC 0.0 0.0  WBC    ABSOLUTE NRBC 0.00 0.00 - 0.01 K/uL    NEUTROPHILS 80 (H) 40 - 73 %    BAND NEUTROPHILS 1 0 - 5 %    LYMPHOCYTES 8 (L) 21 - 52 %    MONOCYTES 11 (H) 3 - 10 %    EOSINOPHILS 0 0 - 5 %    BASOPHILS 0 0 - 2 %    IMMATURE GRANULOCYTES 0 %    ABS. NEUTROPHILS 19.2 (H) 1.8 - 8.0 K/UL    ABS. LYMPHOCYTES 1.9 0.9 - 3.6 K/UL    ABS. MONOCYTES 2.6 (H) 0.05 - 1.2 K/UL    ABS. EOSINOPHILS 0.0 0.0 - 0.4 K/UL    ABS. BASOPHILS 0.0 0.0 - 0.1 K/UL    ABS. IMM. GRANS. 0.0 K/UL    DF AUTOMATED      RBC COMMENTS Normocytic, Normochromic     METABOLIC PANEL, BASIC    Collection Time: 10/27/22  5:20 AM   Result Value Ref Range    Sodium 139 136 - 145 mmol/L    Potassium 2.8 (LL) 3.5 - 5.5 mmol/L    Chloride 102 100 - 111 mmol/L    CO2 27 21 - 32 mmol/L    Anion gap 10 3.0 - 18.0 mmol/L    Glucose 173 (H) 74 - 99 mg/dL    BUN 9 7 - 18 mg/dL    Creatinine 0.75 0.60 - 1.30 mg/dL    BUN/Creatinine ratio 12 12 - 20      eGFR >60 >60 ml/min/1.73m2    Calcium 8.8 8.5 - 10.1 mg/dL   MAGNESIUM    Collection Time: 10/27/22  5:20 AM   Result Value Ref Range    Magnesium 1.7 1.6 - 2.6 mg/dL   GLUCOSE, POC    Collection Time: 10/27/22  7:43 AM   Result Value Ref Range    Glucose (POC) 224 (H) 70 - 110 mg/dL    Performed by George Watson    GLUCOSE, POC    Collection Time: 10/27/22 12:07 PM   Result Value Ref Range    Glucose (POC) 142 (H) 70 - 110 mg/dL    Performed by Freeman Vaughan         Current Nutrition Intake & Therapies:  Average Meal Intake: 26-50%  Average Supplement Intake: None ordered  ADULT DIET Regular; 4 carb choices (60 gm/meal); Low Sodium (2 gm)    Anthropometric Measures:  Height: 5' 7\" (170.2 cm)  Ideal Body Weight (IBW): 148 lbs (67 kg)  Admission Body Weight: 215 lb  Current Body Wt:  97.5 kg (215 lb), 145.3 % IBW.  Bed scale  Current BMI (kg/m2): 33.7        Weight Adjustment: No adjustment                 BMI Category: Obese class 1 (BMI 30.0-34. 9)    Estimated Daily Nutrient Needs:  Energy Requirements Based On: Kcal/kg (20-25 kcal/kg)  Weight Used for Energy Requirements: Admission (98 kg)  Energy (kcal/day): 8150-9904 kcal/day  Weight Used for Protein Requirements: Admission (0.8-1 g/kg)  Protein (g/day): 78-98 g/day  Method Used for Fluid Requirements: 1 ml/kcal  Fluid (ml/day): 2266-1266 mL/day    Nutrition Diagnosis:   Inadequate oral intake related to early satiety as evidenced by intake 26-50%, intake 0-25%    Nutrition Interventions:   Food and/or Nutrient Delivery: Continue current diet, Start oral nutrition supplement  Nutrition Education/Counseling: Education not indicated  Coordination of Nutrition Care: Continue to monitor while inpatient       Goals:     Goals: PO intake 75% or greater       Nutrition Monitoring and Evaluation:      Food/Nutrient Intake Outcomes: Food and nutrient intake, Supplement intake  Physical Signs/Symptoms Outcomes: Biochemical data, Meal time behavior, Weight    Follow up 10/30/2022    Discharge Planning:    Continue current diet    [address]: [phone]

## 2022-10-27 NOTE — PROGRESS NOTES
@1412 Received care of pt from off going nurse. @6048 PM Assessment completed. A&OX4. Resp labored at present. Lung sounds diminished in all lung fields. IS given to pt. Pt encouraged to use. @2005 Temp 100.6. Tylenol 650 mg po given. @0030 Pt resting quietly in bed with eyes closed. @0435 Temp 100.6. Tylenol 650 mg po given. @0441 /135 Hydralazine 10 mg IV given. @0865 Bedside shift report given to oncoming nurse.

## 2022-10-27 NOTE — PROGRESS NOTES
Shift change report received from MARION Grider 19. Assumed care of patient. 1930 - Patient sitting up on side of bed. Vital signs assessed. 2206 - New 22 g PIV placed in left forearm. 2 units SSI administered for POC glucose of 193. HS medications administered. 0000 - Vital signs assessed. Patient c/o seeing people in room. Patient alone in negative pressure room. RN alerted NP to patient complaint and NP went to bedside to speak with patient. 5636 - Vital signs assessed. Antibiotics infusing. New bag continuous fluids infusing. Call light in reach.

## 2022-10-28 LAB
ANION GAP SERPL CALC-SCNC: 8 MMOL/L (ref 3–18)
BASOPHILS # BLD: 0 K/UL (ref 0–0.1)
BASOPHILS NFR BLD: 0 % (ref 0–2)
BUN SERPL-MCNC: 7 MG/DL (ref 7–18)
BUN/CREAT SERPL: 11 (ref 12–20)
CA-I BLD-MCNC: 8.7 MG/DL (ref 8.5–10.1)
CHLORIDE SERPL-SCNC: 103 MMOL/L (ref 100–111)
CO2 SERPL-SCNC: 28 MMOL/L (ref 21–32)
CREAT SERPL-MCNC: 0.66 MG/DL (ref 0.6–1.3)
DATE LAST DOSE: ABNORMAL
DIFFERENTIAL METHOD BLD: ABNORMAL
EOSINOPHIL # BLD: 0 K/UL (ref 0–0.4)
EOSINOPHIL NFR BLD: 0 % (ref 0–5)
ERYTHROCYTE [DISTWIDTH] IN BLOOD BY AUTOMATED COUNT: 15.2 % (ref 11.6–14.5)
GLUCOSE BLD STRIP.AUTO-MCNC: 180 MG/DL (ref 70–110)
GLUCOSE BLD STRIP.AUTO-MCNC: 207 MG/DL (ref 70–110)
GLUCOSE BLD STRIP.AUTO-MCNC: 225 MG/DL (ref 70–110)
GLUCOSE BLD STRIP.AUTO-MCNC: 248 MG/DL (ref 70–110)
GLUCOSE SERPL-MCNC: 149 MG/DL (ref 74–99)
HCT VFR BLD AUTO: 27.5 % (ref 36–48)
HGB BLD-MCNC: 8.7 G/DL (ref 13–16)
IMM GRANULOCYTES # BLD AUTO: 0 K/UL
IMM GRANULOCYTES NFR BLD AUTO: 0 %
LYMPHOCYTES # BLD: 0.8 K/UL (ref 0.9–3.6)
LYMPHOCYTES NFR BLD: 3 % (ref 21–52)
MAGNESIUM SERPL-MCNC: 2.1 MG/DL (ref 1.6–2.6)
MCH RBC QN AUTO: 26 PG (ref 24–34)
MCHC RBC AUTO-ENTMCNC: 31.6 G/DL (ref 31–37)
MCV RBC AUTO: 82.1 FL (ref 78–100)
MONOCYTES # BLD: 1.3 K/UL (ref 0.05–1.2)
MONOCYTES NFR BLD: 5 % (ref 3–10)
NEUTS SEG # BLD: 24.7 K/UL (ref 1.8–8)
NEUTS SEG NFR BLD: 92 % (ref 40–73)
NRBC # BLD: 0 K/UL (ref 0–0.01)
NRBC BLD-RTO: 0 PER 100 WBC
PERFORMED BY, TECHID: ABNORMAL
PLATELET # BLD AUTO: 279 K/UL (ref 135–420)
PMV BLD AUTO: 10 FL (ref 9.2–11.8)
POTASSIUM SERPL-SCNC: 3.1 MMOL/L (ref 3.5–5.5)
RBC # BLD AUTO: 3.35 M/UL (ref 4.35–5.65)
RBC MORPH BLD: ABNORMAL
REPORTED DOSE,DOSE: ABNORMAL UNITS
SODIUM SERPL-SCNC: 139 MMOL/L (ref 136–145)
VANCOMYCIN TROUGH SERPL-MCNC: 6.6 UG/ML (ref 10–20)
WBC # BLD AUTO: 26.8 K/UL (ref 4.6–13.2)
WBC MORPH BLD: ABNORMAL

## 2022-10-28 PROCEDURE — 74011000250 HC RX REV CODE- 250: Performed by: NURSE PRACTITIONER

## 2022-10-28 PROCEDURE — 82962 GLUCOSE BLOOD TEST: CPT

## 2022-10-28 PROCEDURE — 65270000029 HC RM PRIVATE

## 2022-10-28 PROCEDURE — 74011250636 HC RX REV CODE- 250/636: Performed by: NURSE PRACTITIONER

## 2022-10-28 PROCEDURE — 80202 ASSAY OF VANCOMYCIN: CPT

## 2022-10-28 PROCEDURE — 74011000258 HC RX REV CODE- 258: Performed by: NURSE PRACTITIONER

## 2022-10-28 PROCEDURE — 36415 COLL VENOUS BLD VENIPUNCTURE: CPT

## 2022-10-28 PROCEDURE — 74011250636 HC RX REV CODE- 250/636: Performed by: INTERNAL MEDICINE

## 2022-10-28 PROCEDURE — 80048 BASIC METABOLIC PNL TOTAL CA: CPT

## 2022-10-28 PROCEDURE — 74011250637 HC RX REV CODE- 250/637: Performed by: NURSE PRACTITIONER

## 2022-10-28 PROCEDURE — 83735 ASSAY OF MAGNESIUM: CPT

## 2022-10-28 PROCEDURE — 87116 MYCOBACTERIA CULTURE: CPT

## 2022-10-28 PROCEDURE — 77010033678 HC OXYGEN DAILY

## 2022-10-28 PROCEDURE — 85025 COMPLETE CBC W/AUTO DIFF WBC: CPT

## 2022-10-28 PROCEDURE — 74011636637 HC RX REV CODE- 636/637: Performed by: NURSE PRACTITIONER

## 2022-10-28 PROCEDURE — 74011250637 HC RX REV CODE- 250/637: Performed by: INTERNAL MEDICINE

## 2022-10-28 RX ORDER — POTASSIUM CHLORIDE 750 MG/1
40 TABLET, EXTENDED RELEASE ORAL
Status: COMPLETED | OUTPATIENT
Start: 2022-10-28 | End: 2022-10-28

## 2022-10-28 RX ADMIN — GUAIFENESIN 100 MG: 100 SOLUTION ORAL at 21:09

## 2022-10-28 RX ADMIN — PIPERACILLIN AND TAZOBACTAM 3.38 G: 3; .375 INJECTION, POWDER, FOR SOLUTION INTRAVENOUS at 05:09

## 2022-10-28 RX ADMIN — PIPERACILLIN AND TAZOBACTAM 3.38 G: 3; .375 INJECTION, POWDER, FOR SOLUTION INTRAVENOUS at 22:28

## 2022-10-28 RX ADMIN — VANCOMYCIN HYDROCHLORIDE 1000 MG: 1 INJECTION, POWDER, LYOPHILIZED, FOR SOLUTION INTRAVENOUS at 14:18

## 2022-10-28 RX ADMIN — SODIUM CHLORIDE, PRESERVATIVE FREE 10 ML: 5 INJECTION INTRAVENOUS at 05:10

## 2022-10-28 RX ADMIN — PIPERACILLIN AND TAZOBACTAM 3.38 G: 3; .375 INJECTION, POWDER, FOR SOLUTION INTRAVENOUS at 14:18

## 2022-10-28 RX ADMIN — GUAIFENESIN 100 MG: 100 SOLUTION ORAL at 14:18

## 2022-10-28 RX ADMIN — AMLODIPINE BESYLATE 10 MG: 5 TABLET ORAL at 08:19

## 2022-10-28 RX ADMIN — INSULIN LISPRO 4 UNITS: 100 INJECTION, SOLUTION INTRAVENOUS; SUBCUTANEOUS at 17:00

## 2022-10-28 RX ADMIN — CARVEDILOL 6.25 MG: 6.25 TABLET, FILM COATED ORAL at 17:00

## 2022-10-28 RX ADMIN — INSULIN LISPRO 2 UNITS: 100 INJECTION, SOLUTION INTRAVENOUS; SUBCUTANEOUS at 08:19

## 2022-10-28 RX ADMIN — ACETAMINOPHEN 650 MG: 325 TABLET ORAL at 21:55

## 2022-10-28 RX ADMIN — SODIUM CHLORIDE, PRESERVATIVE FREE 10 ML: 5 INJECTION INTRAVENOUS at 14:18

## 2022-10-28 RX ADMIN — VANCOMYCIN HYDROCHLORIDE 1000 MG: 1 INJECTION, POWDER, LYOPHILIZED, FOR SOLUTION INTRAVENOUS at 21:09

## 2022-10-28 RX ADMIN — CARVEDILOL 6.25 MG: 6.25 TABLET, FILM COATED ORAL at 08:19

## 2022-10-28 RX ADMIN — SODIUM CHLORIDE 75 ML/HR: 9 INJECTION, SOLUTION INTRAVENOUS at 05:26

## 2022-10-28 RX ADMIN — SODIUM CHLORIDE, PRESERVATIVE FREE 10 ML: 5 INJECTION INTRAVENOUS at 22:34

## 2022-10-28 RX ADMIN — GUAIFENESIN 600 MG: 600 TABLET ORAL at 21:09

## 2022-10-28 RX ADMIN — SODIUM CHLORIDE 75 ML/HR: 9 INJECTION, SOLUTION INTRAVENOUS at 19:44

## 2022-10-28 RX ADMIN — GUAIFENESIN 100 MG: 100 SOLUTION ORAL at 08:18

## 2022-10-28 RX ADMIN — LISINOPRIL 10 MG: 5 TABLET ORAL at 08:19

## 2022-10-28 RX ADMIN — SODIUM CHLORIDE, PRESERVATIVE FREE 10 ML: 5 INJECTION INTRAVENOUS at 22:28

## 2022-10-28 RX ADMIN — VANCOMYCIN HYDROCHLORIDE 1000 MG: 1 INJECTION, POWDER, LYOPHILIZED, FOR SOLUTION INTRAVENOUS at 05:09

## 2022-10-28 RX ADMIN — ENOXAPARIN SODIUM 40 MG: 100 INJECTION SUBCUTANEOUS at 08:16

## 2022-10-28 RX ADMIN — INSULIN LISPRO 4 UNITS: 100 INJECTION, SOLUTION INTRAVENOUS; SUBCUTANEOUS at 11:33

## 2022-10-28 RX ADMIN — INSULIN LISPRO 4 UNITS: 100 INJECTION, SOLUTION INTRAVENOUS; SUBCUTANEOUS at 21:55

## 2022-10-28 RX ADMIN — GUAIFENESIN 600 MG: 600 TABLET ORAL at 08:19

## 2022-10-28 RX ADMIN — TAMSULOSIN HYDROCHLORIDE 0.4 MG: 0.4 CAPSULE ORAL at 17:01

## 2022-10-28 RX ADMIN — POTASSIUM CHLORIDE 40 MEQ: 750 TABLET, EXTENDED RELEASE ORAL at 21:09

## 2022-10-28 NOTE — ANTIMICROBIAL STEWARDSHIP
Pharmacist Note - Vancomycin Dosing  Therapy day 2  Indication: Pneumonia (CAP)  Current regimen: 1000mg IV every 12 hours    A Trough Level resulted at 6.6 mcg/mL which was obtained 11.5 hrs post-dose. The extrapolated \"true\" trough is approximately 9.2 mcg/mL based on the patient's known kinetics. AUC goal : 400-600 mg/L*hr    Plan: Change to 1000mg IV every 8 hours . Pharmacy will continue to monitor this patient daily for changes in clinical status and renal function.     Expected levels:      NEXT TROUGH 10/29/22 AT 12:00

## 2022-10-28 NOTE — PROGRESS NOTES
1954 - Vital signs assessed. New bag continuous fluids infusing. Patient educated on positioning for maximum oxygen flow in bed (I.e. proning versus supine). 2109 - Scheduled medications administered. 2155 - 4 units SSI administered for POC glucose 248.     2228 - Zosyn infusing. Patient denies needs at this time. Call light in reach. 2323 - vital signs assessed. Patient ambulated independently to bathroom. Patient's spouse in recliner at bedside. 0200 -Patient resting comfortably with call light in reach. 9044 - vital signs assessed    0515 - PRN Tylenol administered for wrist pain.      21 - zoysn infusing

## 2022-10-28 NOTE — PROGRESS NOTES
Problem: Falls - Risk of  Goal: *Absence of Falls  Description: Document Vazquez Vasquez Fall Risk and appropriate interventions in the flowsheet. Outcome: Progressing Towards Goal  Note: Fall Risk Interventions:            Medication Interventions: Teach patient to arise slowly         History of Falls Interventions: Evaluate medications/consider consulting pharmacy         Problem: Patient Education: Go to Patient Education Activity  Goal: Patient/Family Education  Outcome: Progressing Towards Goal     Problem:  Activity Intolerance  Goal: *Oxygen saturation during activity within specified parameters  Outcome: Progressing Towards Goal  Goal: *Able to remain out of bed as prescribed  Outcome: Progressing Towards Goal     Problem: Patient Education: Go to Patient Education Activity  Goal: Patient/Family Education  Outcome: Progressing Towards Goal     Problem: Diabetes Maintenance:Admission  Goal: Activity/Safety  Outcome: Progressing Towards Goal  Goal: Diagnostic Tests/Procedures  Outcome: Progressing Towards Goal  Goal: Nutrition  Outcome: Progressing Towards Goal  Goal: Medications  Outcome: Progressing Towards Goal  Goal: Treatments/Interventions/Procedures  Outcome: Progressing Towards Goal     Problem: Diabetes Maintenance:Ongoing  Goal: Activity/Safety  Outcome: Progressing Towards Goal  Goal: Nutrition  Outcome: Progressing Towards Goal  Goal: Medications  Outcome: Progressing Towards Goal  Goal: Treatments/Interventsions/Procedures  Outcome: Progressing Towards Goal  Goal: *Blood Glucose 80 to 180 md/dl  Outcome: Progressing Towards Goal     Problem: Diabetes Maintenance:Discharge Outcomes  Goal: *Describes follow-up/return visits to physicians  Outcome: Progressing Towards Goal  Goal: *Blood glucose at patient's target range or approaching  Outcome: Progressing Towards Goal  Goal: *Aware of nutrition guidelines  Outcome: Progressing Towards Goal  Goal: *Verbalizes information about medication  Description: Verbalizes name, dosage, time, side effects, and number of days to  continue medications. Outcome: Progressing Towards Goal  Goal: *Describes goals, rules, symptoms, and treatments  Description: Describes blood glucose goals, monitoring, sick day rules,  hypo/hyperglycemia prevention, symptoms, and treatment  Outcome: Progressing Towards Goal  Goal: *Describes available outpatient diabetes resources and support systems  Outcome: Progressing Towards Goal     Problem: Airway Clearance - Ineffective  Goal: *Patent airway  Outcome: Progressing Towards Goal  Goal: *Absence of airway secretions  Outcome: Progressing Towards Goal  Goal: *Able to cough effectively  Outcome: Progressing Towards Goal     Problem: Patient Education: Go to Patient Education Activity  Goal: Patient/Family Education  Outcome: Progressing Towards Goal     Problem: Risk for Spread of Infection  Goal: Prevent transmission of infectious organism to others  Description: Prevent the transmission of infectious organisms to other patients, staff members, and visitors.   Outcome: Progressing Towards Goal     Problem: Patient Education:  Go to Education Activity  Goal: Patient/Family Education  Outcome: Progressing Towards Goal     Problem: Nutrition Deficit  Goal: *Optimize nutritional status  Outcome: Progressing Towards Goal

## 2022-10-28 NOTE — PROGRESS NOTES
0700- bedside shift report completed and care of the patient assumed    0816- am medications administered, ice water provided    1130- washe dup, up in chair, lunch provided    1418- prn cough syrup administered with antibiotics    1700- scheduled medication administered     1830- rounding on patient, family in room no needs at this time

## 2022-10-29 ENCOUNTER — HOSPITAL ENCOUNTER (INPATIENT)
Age: 62
LOS: 4 days | Discharge: HOME HEALTH | DRG: 871 | End: 2022-11-02
Attending: INTERNAL MEDICINE | Admitting: HOSPITALIST
Payer: MEDICARE

## 2022-10-29 VITALS
RESPIRATION RATE: 20 BRPM | OXYGEN SATURATION: 94 % | BODY MASS INDEX: 33.82 KG/M2 | HEIGHT: 67 IN | SYSTOLIC BLOOD PRESSURE: 159 MMHG | TEMPERATURE: 98.4 F | WEIGHT: 215.5 LBS | DIASTOLIC BLOOD PRESSURE: 85 MMHG | HEART RATE: 102 BPM

## 2022-10-29 PROBLEM — J98.4 CAVITARY PNEUMONIA: Status: ACTIVE | Noted: 2022-10-24

## 2022-10-29 PROBLEM — J18.9 CAVITARY PNEUMONIA: Status: ACTIVE | Noted: 2022-10-24

## 2022-10-29 PROBLEM — J18.9 PNEUMONIA: Status: ACTIVE | Noted: 2022-10-29

## 2022-10-29 LAB
ACID FAST STN SPEC: NEGATIVE
ANION GAP SERPL CALC-SCNC: 9 MMOL/L (ref 3–18)
BASOPHILS # BLD: 0 K/UL (ref 0–0.1)
BASOPHILS NFR BLD: 0 % (ref 0–2)
BNP SERPL-MCNC: 914 PG/ML (ref 0–900)
BUN SERPL-MCNC: 8 MG/DL (ref 7–18)
BUN/CREAT SERPL: 12 (ref 12–20)
CA-I BLD-MCNC: 9.1 MG/DL (ref 8.5–10.1)
CHLORIDE SERPL-SCNC: 102 MMOL/L (ref 100–111)
CO2 SERPL-SCNC: 26 MMOL/L (ref 21–32)
CREAT SERPL-MCNC: 0.67 MG/DL (ref 0.6–1.3)
DATE LAST DOSE: NORMAL
DIFFERENTIAL METHOD BLD: ABNORMAL
EOSINOPHIL # BLD: 0 K/UL (ref 0–0.4)
EOSINOPHIL NFR BLD: 0 % (ref 0–5)
ERYTHROCYTE [DISTWIDTH] IN BLOOD BY AUTOMATED COUNT: 15.4 % (ref 11.6–14.5)
GLUCOSE BLD STRIP.AUTO-MCNC: 180 MG/DL (ref 70–110)
GLUCOSE BLD STRIP.AUTO-MCNC: 215 MG/DL (ref 70–110)
GLUCOSE BLD STRIP.AUTO-MCNC: 229 MG/DL (ref 70–110)
GLUCOSE SERPL-MCNC: 145 MG/DL (ref 74–99)
HCT VFR BLD AUTO: 30.4 % (ref 36–48)
HGB BLD-MCNC: 9.4 G/DL (ref 13–16)
IMM GRANULOCYTES # BLD AUTO: 0 K/UL
IMM GRANULOCYTES NFR BLD AUTO: 0 %
LACTATE SERPL-SCNC: 1.3 MMOL/L (ref 0.4–2)
LYMPHOCYTES # BLD: 4 K/UL (ref 0.9–3.6)
LYMPHOCYTES NFR BLD: 15 % (ref 21–52)
MAGNESIUM SERPL-MCNC: 2.1 MG/DL (ref 1.6–2.6)
MCH RBC QN AUTO: 26.3 PG (ref 24–34)
MCHC RBC AUTO-ENTMCNC: 30.9 G/DL (ref 31–37)
MCV RBC AUTO: 85.2 FL (ref 78–100)
MONOCYTES # BLD: 0.5 K/UL (ref 0.05–1.2)
MONOCYTES NFR BLD: 2 % (ref 3–10)
MYCOBACTERIUM SPEC QL CULT: NORMAL
NEUTS SEG # BLD: 21.5 K/UL (ref 1.8–8)
NEUTS SEG NFR BLD: 81 % (ref 40–73)
NRBC # BLD: 0 K/UL (ref 0–0.01)
NRBC BLD-RTO: 0 PER 100 WBC
OTHER CELLS NFR BLD MANUAL: 2 %
PERFORMED BY, TECHID: ABNORMAL
PLATELET # BLD AUTO: 313 K/UL (ref 135–420)
PMV BLD AUTO: 10.2 FL (ref 9.2–11.8)
POTASSIUM SERPL-SCNC: 3.3 MMOL/L (ref 3.5–5.5)
RBC # BLD AUTO: 3.57 M/UL (ref 4.35–5.65)
RBC MORPH BLD: ABNORMAL
REPORTED DOSE,DOSE: NORMAL UNITS
SODIUM SERPL-SCNC: 137 MMOL/L (ref 136–145)
SPECIMEN PREPARATION: NORMAL
SPECIMEN SOURCE: NORMAL
VANCOMYCIN TROUGH SERPL-MCNC: 10.1 UG/ML (ref 10–20)
WBC # BLD AUTO: 26.5 K/UL (ref 4.6–13.2)

## 2022-10-29 PROCEDURE — 74011636637 HC RX REV CODE- 636/637: Performed by: NURSE PRACTITIONER

## 2022-10-29 PROCEDURE — 80048 BASIC METABOLIC PNL TOTAL CA: CPT

## 2022-10-29 PROCEDURE — 94640 AIRWAY INHALATION TREATMENT: CPT

## 2022-10-29 PROCEDURE — 74011000250 HC RX REV CODE- 250: Performed by: NURSE PRACTITIONER

## 2022-10-29 PROCEDURE — 74011250637 HC RX REV CODE- 250/637: Performed by: NURSE PRACTITIONER

## 2022-10-29 PROCEDURE — 80202 ASSAY OF VANCOMYCIN: CPT

## 2022-10-29 PROCEDURE — 94669 MECHANICAL CHEST WALL OSCILL: CPT

## 2022-10-29 PROCEDURE — 85025 COMPLETE CBC W/AUTO DIFF WBC: CPT

## 2022-10-29 PROCEDURE — 74011000258 HC RX REV CODE- 258: Performed by: NURSE PRACTITIONER

## 2022-10-29 PROCEDURE — 83880 ASSAY OF NATRIURETIC PEPTIDE: CPT

## 2022-10-29 PROCEDURE — 83735 ASSAY OF MAGNESIUM: CPT

## 2022-10-29 PROCEDURE — 83605 ASSAY OF LACTIC ACID: CPT

## 2022-10-29 PROCEDURE — 77010033678 HC OXYGEN DAILY

## 2022-10-29 PROCEDURE — 74011250636 HC RX REV CODE- 250/636: Performed by: INTERNAL MEDICINE

## 2022-10-29 PROCEDURE — 36415 COLL VENOUS BLD VENIPUNCTURE: CPT

## 2022-10-29 PROCEDURE — 94761 N-INVAS EAR/PLS OXIMETRY MLT: CPT

## 2022-10-29 PROCEDURE — 74011250636 HC RX REV CODE- 250/636: Performed by: NURSE PRACTITIONER

## 2022-10-29 PROCEDURE — 82962 GLUCOSE BLOOD TEST: CPT

## 2022-10-29 PROCEDURE — 65270000029 HC RM PRIVATE

## 2022-10-29 RX ORDER — SODIUM CHLORIDE 0.9 % (FLUSH) 0.9 %
5-40 SYRINGE (ML) INJECTION AS NEEDED
Status: DISCONTINUED | OUTPATIENT
Start: 2022-10-29 | End: 2022-11-02 | Stop reason: HOSPADM

## 2022-10-29 RX ORDER — AMLODIPINE BESYLATE 5 MG/1
10 TABLET ORAL DAILY
Status: DISCONTINUED | OUTPATIENT
Start: 2022-10-30 | End: 2022-11-02 | Stop reason: HOSPADM

## 2022-10-29 RX ORDER — DEXTROSE MONOHYDRATE 100 MG/ML
0-250 INJECTION, SOLUTION INTRAVENOUS AS NEEDED
Status: DISCONTINUED | OUTPATIENT
Start: 2022-10-29 | End: 2022-11-02 | Stop reason: HOSPADM

## 2022-10-29 RX ORDER — MAGNESIUM SULFATE 100 %
4 CRYSTALS MISCELLANEOUS AS NEEDED
Status: DISCONTINUED | OUTPATIENT
Start: 2022-10-29 | End: 2022-11-02 | Stop reason: HOSPADM

## 2022-10-29 RX ORDER — LISINOPRIL 10 MG/1
10 TABLET ORAL DAILY
Status: DISCONTINUED | OUTPATIENT
Start: 2022-10-30 | End: 2022-10-31

## 2022-10-29 RX ORDER — CARVEDILOL 3.12 MG/1
6.25 TABLET ORAL 2 TIMES DAILY WITH MEALS
Status: DISCONTINUED | OUTPATIENT
Start: 2022-10-30 | End: 2022-11-02 | Stop reason: HOSPADM

## 2022-10-29 RX ORDER — ACETAMINOPHEN 325 MG/1
650 TABLET ORAL
Status: DISCONTINUED | OUTPATIENT
Start: 2022-10-29 | End: 2022-11-02 | Stop reason: HOSPADM

## 2022-10-29 RX ORDER — SODIUM CHLORIDE 0.9 % (FLUSH) 0.9 %
5-40 SYRINGE (ML) INJECTION EVERY 8 HOURS
Status: DISCONTINUED | OUTPATIENT
Start: 2022-10-29 | End: 2022-11-02 | Stop reason: HOSPADM

## 2022-10-29 RX ORDER — IPRATROPIUM BROMIDE AND ALBUTEROL SULFATE 2.5; .5 MG/3ML; MG/3ML
3 SOLUTION RESPIRATORY (INHALATION)
Status: DISCONTINUED | OUTPATIENT
Start: 2022-10-29 | End: 2022-10-29

## 2022-10-29 RX ORDER — GUAIFENESIN 600 MG/1
600 TABLET, EXTENDED RELEASE ORAL EVERY 12 HOURS
Status: DISCONTINUED | OUTPATIENT
Start: 2022-10-30 | End: 2022-11-02 | Stop reason: HOSPADM

## 2022-10-29 RX ORDER — ALBUTEROL SULFATE 0.83 MG/ML
2.5 SOLUTION RESPIRATORY (INHALATION)
Status: DISCONTINUED | OUTPATIENT
Start: 2022-10-29 | End: 2022-10-29 | Stop reason: HOSPADM

## 2022-10-29 RX ORDER — INSULIN LISPRO 100 [IU]/ML
INJECTION, SOLUTION INTRAVENOUS; SUBCUTANEOUS
Status: DISCONTINUED | OUTPATIENT
Start: 2022-10-30 | End: 2022-10-30

## 2022-10-29 RX ORDER — ENOXAPARIN SODIUM 100 MG/ML
40 INJECTION SUBCUTANEOUS DAILY
Status: DISCONTINUED | OUTPATIENT
Start: 2022-10-30 | End: 2022-10-30

## 2022-10-29 RX ORDER — ALBUTEROL SULFATE 0.83 MG/ML
2.5 SOLUTION RESPIRATORY (INHALATION)
Status: DISCONTINUED | OUTPATIENT
Start: 2022-10-30 | End: 2022-10-30

## 2022-10-29 RX ORDER — ACETYLCYSTEINE 200 MG/ML
200 SOLUTION ORAL; RESPIRATORY (INHALATION)
Status: DISCONTINUED | OUTPATIENT
Start: 2022-10-30 | End: 2022-10-30

## 2022-10-29 RX ORDER — TAMSULOSIN HYDROCHLORIDE 0.4 MG/1
0.4 CAPSULE ORAL EVERY EVENING
Status: DISCONTINUED | OUTPATIENT
Start: 2022-10-30 | End: 2022-11-02 | Stop reason: HOSPADM

## 2022-10-29 RX ORDER — ACETYLCYSTEINE 200 MG/ML
200 SOLUTION ORAL; RESPIRATORY (INHALATION)
Status: DISCONTINUED | OUTPATIENT
Start: 2022-10-29 | End: 2022-10-29 | Stop reason: HOSPADM

## 2022-10-29 RX ORDER — BUDESONIDE AND FORMOTEROL FUMARATE DIHYDRATE 160; 4.5 UG/1; UG/1
2 AEROSOL RESPIRATORY (INHALATION)
Status: DISCONTINUED | OUTPATIENT
Start: 2022-10-30 | End: 2022-11-02 | Stop reason: HOSPADM

## 2022-10-29 RX ADMIN — ALBUTEROL SULFATE 2.5 MG: 2.5 SOLUTION RESPIRATORY (INHALATION) at 15:51

## 2022-10-29 RX ADMIN — ENOXAPARIN SODIUM 40 MG: 100 INJECTION SUBCUTANEOUS at 09:11

## 2022-10-29 RX ADMIN — GUAIFENESIN 600 MG: 600 TABLET ORAL at 09:11

## 2022-10-29 RX ADMIN — SODIUM CHLORIDE 75 ML/HR: 9 INJECTION, SOLUTION INTRAVENOUS at 09:12

## 2022-10-29 RX ADMIN — INSULIN LISPRO 2 UNITS: 100 INJECTION, SOLUTION INTRAVENOUS; SUBCUTANEOUS at 11:30

## 2022-10-29 RX ADMIN — VANCOMYCIN HYDROCHLORIDE 1000 MG: 1 INJECTION, POWDER, LYOPHILIZED, FOR SOLUTION INTRAVENOUS at 05:15

## 2022-10-29 RX ADMIN — TAMSULOSIN HYDROCHLORIDE 0.4 MG: 0.4 CAPSULE ORAL at 17:47

## 2022-10-29 RX ADMIN — INSULIN LISPRO 4 UNITS: 100 INJECTION, SOLUTION INTRAVENOUS; SUBCUTANEOUS at 09:09

## 2022-10-29 RX ADMIN — ACETAMINOPHEN 650 MG: 325 TABLET ORAL at 05:15

## 2022-10-29 RX ADMIN — PIPERACILLIN AND TAZOBACTAM 3.38 G: 3; .375 INJECTION, POWDER, FOR SOLUTION INTRAVENOUS at 06:42

## 2022-10-29 RX ADMIN — LISINOPRIL 10 MG: 5 TABLET ORAL at 09:11

## 2022-10-29 RX ADMIN — CARVEDILOL 6.25 MG: 6.25 TABLET, FILM COATED ORAL at 16:49

## 2022-10-29 RX ADMIN — INSULIN LISPRO 4 UNITS: 100 INJECTION, SOLUTION INTRAVENOUS; SUBCUTANEOUS at 16:50

## 2022-10-29 RX ADMIN — ACETYLCYSTEINE 200 MG: 200 SOLUTION ORAL; RESPIRATORY (INHALATION) at 15:52

## 2022-10-29 RX ADMIN — SODIUM CHLORIDE, PRESERVATIVE FREE 10 ML: 5 INJECTION INTRAVENOUS at 14:02

## 2022-10-29 RX ADMIN — AMLODIPINE BESYLATE 10 MG: 5 TABLET ORAL at 09:11

## 2022-10-29 RX ADMIN — SODIUM CHLORIDE, PRESERVATIVE FREE 10 ML: 5 INJECTION INTRAVENOUS at 05:37

## 2022-10-29 RX ADMIN — CARVEDILOL 6.25 MG: 6.25 TABLET, FILM COATED ORAL at 09:11

## 2022-10-29 RX ADMIN — PIPERACILLIN AND TAZOBACTAM 3.38 G: 3; .375 INJECTION, POWDER, FOR SOLUTION INTRAVENOUS at 14:39

## 2022-10-29 RX ADMIN — VANCOMYCIN HYDROCHLORIDE 1000 MG: 1 INJECTION, POWDER, LYOPHILIZED, FOR SOLUTION INTRAVENOUS at 13:51

## 2022-10-29 NOTE — PROGRESS NOTES
Problem: Falls - Risk of  Goal: *Absence of Falls  Description: Document Tena Nap Fall Risk and appropriate interventions in the flowsheet.   Outcome: Resolved/Met  Note: Fall Risk Interventions:            Medication Interventions: Evaluate medications/consider consulting pharmacy, Teach patient to arise slowly         History of Falls Interventions: Evaluate medications/consider consulting pharmacy

## 2022-10-29 NOTE — PROGRESS NOTES
Hospitalist Progress Note    Daily Progress Note: 10/28/2022 11:08 PM      Nick Perry                                            MRN: [id number]                                  :1960      Subjective:     Pt examined and seen at bedside. Patient is alert and oriented x 4, there are no signs and symptoms of distress. Patient stable on 2 L via nasal cannula, he reports that his coughing and shortness of breath has improved. Discussed with the patient and his wife at the bedside that patient continue to require antibiotic coverage, his WBC had started to improve, but now has increased. We continue to await the results of the TB spot test and AFB sputums. Hospital team is planning to reach out to the transfer center tomorrow to revisit transferring the patient to a facility with pulmonologist coverage. Overnight  patient experience visual and auditory hallucination, patient denies alcohol use, but endorses daily marijuana use. Labs and Vitals: BMP and CBC in the am      Objective:     Visit Vitals  BP (!) 171/81 (BP 1 Location: Right upper arm, BP Patient Position: Semi fowlers)   Pulse (!) 101   Temp 98 °F (36.7 °C)   Resp 20   Ht 5' 7\" (1.702 m)   Wt 97.8 kg (215 lb 8 oz)   SpO2 92%   BMI 33.75 kg/m²    O2 Flow Rate (L/min): 2 l/min O2 Device: Nasal cannula    Temp (24hrs), Av.8 °F (36.6 °C), Min:97 °F (36.1 °C), Max:98.5 °F (36.9 °C)      No intake/output data recorded. 10/27 0701 - 10/28 1900  In: 9600 [P.O.:1100; I.V.:1625]  Out: 400 [Urine:400]    PHYSICAL EXAM:  Physical Exam  Vitals and nursing note reviewed. Constitutional:       Appearance: Normal appearance. She is normal weight. HENT:      Head: Normocephalic and atraumatic. Mouth/Throat:      Mouth: Mucous membranes are moist.   Eyes:      Extraocular Movements: Extraocular movements intact. Pupils: Pupils are equal, round, and reactive to light.    Cardiovascular:      Rate and Rhythm: Normal rate and regular rhythm. Pulses: Normal pulses. Heart sounds: Normal heart sounds. Pulmonary:      Effort: Pulmonary effort is normal.      Breath sounds: Normal breath sounds. Abdominal:      General: Abdomen is flat. Bowel sounds are normal.      Palpations: Abdomen is soft. Musculoskeletal:      Cervical back: Normal range of motion and neck supple. Skin:     General: Skin is warm and dry. Capillary Refill: Capillary refill takes less than 2 seconds. Neurological:      General: No focal deficit present. Mental Status: She is alert and oriented to person, place, and time.    Psychiatric:         Mood and Affect: Mood normal.     Current Facility-Administered Medications   Medication Dose Route Frequency    vancomycin (VANCOCIN) 1,000 mg in 0.9% sodium chloride 250 mL ([id number])  1,000 mg IntraVENous Q8H    0.9% sodium chloride infusion  75 mL/hr IntraVENous CONTINUOUS    lisinopriL (PRINIVIL, ZESTRIL) tablet 10 mg  10 mg Oral DAILY    carvediloL (COREG) tablet 6.25 mg  6.25 mg Oral BID WITH MEALS    piperacillin-tazobactam (ZOSYN) 3.375 g in 0.9% sodium chloride (MBP/ADV) 100 mL MBP  3.375 g IntraVENous Q8H    VANCOMYCIN INFORMATION NOTE 1 Each  1 Each Other Rx Dosing/Monitoring    guaiFENesin (ROBITUSSIN) 100 mg/5 mL oral liquid 100 mg  100 mg Oral Q4H PRN    sodium chloride (NS) flush 5-40 mL  5-40 mL IntraVENous Q8H    sodium chloride (NS) flush 5-40 mL  5-40 mL IntraVENous PRN    acetaminophen (TYLENOL) tablet 650 mg  650 mg Oral Q6H PRN    Or    acetaminophen (TYLENOL) suppository 650 mg  650 mg Rectal Q6H PRN    polyethylene glycol (MIRALAX) packet 17 g  17 g Oral DAILY PRN    ondansetron (ZOFRAN ODT) tablet 4 mg  4 mg Oral Q8H PRN    Or    ondansetron (ZOFRAN) injection 4 mg  4 mg IntraVENous Q6H PRN    enoxaparin (LOVENOX) injection 40 mg  40 mg SubCUTAneous DAILY    amLODIPine (NORVASC) tablet 10 mg  10 mg Oral DAILY    tamsulosin (FLOMAX) capsule 0.4 mg  0.4 mg Oral QPM    hydrALAZINE (APRESOLINE) 20 mg/mL injection 10 mg  10 mg IntraVENous Q6H PRN    guaiFENesin ER (MUCINEX) tablet 600 mg  600 mg Oral Q12H    albuterol (PROVENTIL HFA, VENTOLIN HFA, PROAIR HFA) inhaler 2 Puff  2 Puff Inhalation Q4H PRN    glucose chewable tablet 16 g  4 Tablet Oral PRN    glucagon (GLUCAGEN) injection 1 mg  1 mg IntraMUSCular PRN    insulin lispro (HUMALOG) injection   SubCUTAneous AC&HS        Assessment/Plan:     Acute respiratory failure with hypoxia (HCC)  -improving, patient stable on 1 LPM via NC  -Covid negative  -CXR:nodular bilateral parenchymal infiltrates are present  -CT Chest: Interval development of numerous bilateral lung parenchymal nodules, with largest nodule right lung apex maximally 2.8 cm with air bronchograms, patient reports that he was told he had lung nodules about 3 months ago, but never had a follow-up, patient advised he need to have his follow-up with his PCP   -keep O2 saturation greater than 92%  - attempted to transfer the patient for pulmonologist coverage for possible bronch, but pulmonologist states this related to inflammatory/infectious process and to continue antibiotics, which were broaden     Sepsis ([facility] [address]  -patient continues to be mildly tachycardic WBC was improving, but is starting to increase  -pracalcitonin: 1.63  -patient continue to receive gentle hydration via IV   -antibiotics was broaden previous day, patient will continue on Vancomycin and Zosyn  -blood cultures no growth in 4 days  -CBC in am    -likely secondary pneumonia  -Quantiferon ordered by the ED provider and is in process, AFB pending  - attempted to transfer the patient for pulmonologist coverage 2 days ago, patient remains the same despite broaden antibiotic coverage, spoke with attending and the plan is to reach out to the transfer center tomorrow and revisit transferring to facility with pulmonologist   -continue IV Vancomycin per pharmacy and Zosyn     Hypokalemia  -3.1, improving,oral replacement provided,   -repeat BMP in the am     Hypertension Urgency  -chronic, blood pressure has improved since starting Coreg and Lisinopril  -monitor BP closely     Diabetes Mellitus Type 2  -per patient diet control  -will implement sliding scale and accu-checks prior to meals and bedtime     BPH  -chronic, continue Flomax     Tobacco Abuse  -nicotine patch refused  -smoking cessation education       DVT Prophylaxis: Lovenox    Code Status: Full    Care Plan discussed with: Patient, spouse, and attending provider    Clinical time 25 minutes with >50% of visit spent in counseling and coordination of care    Signed by: DOC Ellis-BC 10/28/2022

## 2022-10-29 NOTE — PROGRESS NOTES
Progress Note    Patient: Min Mcbride MRN: [id number]  SSN: [id number]    YOB: 1960  Age: 58 y.o. Sex: male      Admit Date: 10/24/2022    LOS: 5 days     Subjective:   -Seen for f/up on sepsis/PNA/Pulmonary lung nodules. -pt states \"I feel better\". Endorsed cough, +ve sputum, No chest pain, worsening sob, fever, chills, n/v/d. No weakness or body aches. No dysuria. Objective:     Vitals:    10/29/22 0800 10/29/22 0930 10/29/22 1141 10/29/22 1200   BP:  (!) 156/96 (!) 151/85    Pulse: (!) 103 (!) 103 94 96   Resp:  20 18    Temp:  98 °F (36.7 °C) 97.3 °F (36.3 °C)    SpO2:  91% 94%    Weight:       Height:            Intake and Output:  Current Shift: No intake/output data recorded. Last three shifts: 10/27 1901 - 10/29 0700  In: 500 [P.O.:500]  Out: 1250 [Urine:1250]    Physical Exam:   Physical Exam  Vitals and nursing note reviewed. Constitutional:       Appearance: Normal appearance. He is normal weight. Comments: Not ill or toxic appearing, noted sitting up in bed, on room air. HENT:      Head: Normocephalic and atraumatic. Nose: Nose normal.      Mouth/Throat:      Mouth: Mucous membranes are moist.   Eyes:      Extraocular Movements: Extraocular movements intact. Pupils: Pupils are equal, round, and reactive to light. Cardiovascular:      Rate and Rhythm: Regular rhythm. Tachycardia present. Pulses: Normal pulses. Heart sounds: Normal heart sounds. Pulmonary:      Effort: Pulmonary effort is normal.      Breath sounds: No wheezing. Comments: Very diminished breath sounds in bilateral upper and lower lobes, No crackles. No wheezes. Abdominal:      General: Bowel sounds are normal.      Palpations: Abdomen is soft. Musculoskeletal:         General: Normal range of motion. Cervical back: Normal range of motion and neck supple. Skin:     General: Skin is warm and dry.       Capillary Refill: Capillary refill takes less than 2 seconds. Neurological:      General: No focal deficit present. Mental Status: He is alert and oriented to person, place, and time. Mental status is at baseline. Psychiatric:         Mood and Affect: Mood normal.         Behavior: Behavior normal.        Lab/Data Review:  Recent Results (from the past 24 hour(s))   GLUCOSE, POC    Collection Time: 10/28/22  4:10 PM   Result Value Ref Range    Glucose (POC) 207 (H) 70 - 110 mg/dL    Performed by Christen Aburto    GLUCOSE, POC    Collection Time: 10/28/22  9:14 PM   Result Value Ref Range    Glucose (POC) 248 (H) 70 - 110 mg/dL    Performed by Jakob Art    MAGNESIUM    Collection Time: 10/29/22  4:20 AM   Result Value Ref Range    Magnesium 2.1 1.6 - 2.6 mg/dL   CBC WITH AUTOMATED DIFF    Collection Time: 10/29/22  4:20 AM   Result Value Ref Range    WBC 26.5 (H) 4.6 - 13.2 K/uL    RBC 3.57 (L) 4.35 - 5.65 M/uL    HGB 9.4 (L) 13.0 - 16.0 g/dL    HCT 30.4 (L) 36.0 - 48.0 %    MCV 85.2 78.0 - 100.0 FL    MCH 26.3 24.0 - 34.0 PG    MCHC 30.9 (L) 31.0 - 37.0 g/dL    RDW 15.4 (H) 11.6 - 14.5 %    PLATELET 252 186 - 567 K/uL    MPV 10.2 9.2 - 11.8 FL    NRBC 0.0 0.0  WBC    ABSOLUTE NRBC 0.00 0.00 - 0.01 K/uL    NEUTROPHILS 81 (H) 40 - 73 %    LYMPHOCYTES 15 (L) 21 - 52 %    MONOCYTES 2 (L) 3 - 10 %    EOSINOPHILS 0 0 - 5 %    BASOPHILS 0 0 - 2 %    OTHER CELL 2 %    IMMATURE GRANULOCYTES 0 %    ABS. NEUTROPHILS 21.5 (H) 1.8 - 8.0 K/UL    ABS. LYMPHOCYTES 4.0 (H) 0.9 - 3.6 K/UL    ABS. MONOCYTES 0.5 0.05 - 1.2 K/UL    ABS. EOSINOPHILS 0.0 0.0 - 0.4 K/UL    ABS. BASOPHILS 0.0 0.0 - 0.1 K/UL    ABS. IMM.  GRANS. 0.0 K/UL    DF Manual      RBC COMMENTS Normocytic, Normochromic     METABOLIC PANEL, BASIC    Collection Time: 10/29/22  4:20 AM   Result Value Ref Range    Sodium 137 136 - 145 mmol/L    Potassium 3.3 (L) 3.5 - 5.5 mmol/L    Chloride 102 100 - 111 mmol/L    CO2 26 21 - 32 mmol/L    Anion gap 9 3.0 - 18.0 mmol/L    Glucose 145 (H) 74 - 99 mg/dL    BUN 8 7 - 18 mg/dL    Creatinine 0.67 0.60 - 1.30 mg/dL    BUN/Creatinine ratio 12 12 - 20      eGFR >60 >60 ml/min/1.73m2    Calcium 9.1 8.5 - 10.1 mg/dL   GLUCOSE, POC    Collection Time: 10/29/22  8:36 AM   Result Value Ref Range    Glucose (POC) 215 (H) 70 - 110 mg/dL    Performed by [address], POC    Collection Time: 10/29/22 11:35 AM   Result Value Ref Range    Glucose (POC) 180 (H) 70 - 110 mg/dL    Performed by Bettye Estrada, TROUGH    Collection Time: 10/29/22 12:25 PM   Result Value Ref Range    Vancomycin,trough 10.1 10.0 - 20.0 ug/mL    Reported dose date Blood      Reported dose: Blood Units   LACTIC ACID    Collection Time: 10/29/22  1:15 PM   Result Value Ref Range    Lactic acid 1.3 0.4 - 2.0 mmol/L   NT-PRO BNP    Collection Time: 10/29/22  1:15 PM   Result Value Ref Range    NT pro- (H) 0 - 900 pg/mL         Assessment:     Principal Problem:    Sepsis ([address] (10/24/2022)    Active Problems:    Acute respiratory failure with hypoxia ([address] (10/24/2022)      Hypokalemia (10/24/2022)      Primary hypertension (10/24/2022)      Respiratory failure ([address] (10/24/2022)      Pneumonia (10/24/2022)      Plan:     #1: Acute respiratory failure with hypoxia ([address]  -improved. Now on room air.  -rapid covid test 10/24/22--negative   -ddimer 6.4--chest CTA no PE shows extensive pulmonary nodules, largest 2.8cm, ?metastatic/inflammatory/infectious etiology, and small left cavitation, AFB smears and TB spot test pending. -pulmonologist at [facility] contacted 10/26/22, and he does not recommend bronchoscopy at that time, advised IV abx.   -prior incidental findings of pulmonary lung nodules--May 2022, but no work up. .   -he continues to have persistent leukocytosis, and tachycardia, called transfer center today for pulmonology access, spoke with hospitalist at [facility], Dr Brittaney Rob, who accepts patient for transfer. Bed pending.     #2: Sepsis:  -POA, suspect 2/2 bilateral pneumonia  -lactic was 1.6, procal 1.63 on admit.   -cont IV abx with vanc and zosyn  -cont O2 supp prn to keep sats> 92%  -repeat lactic today is 1.3, blood cultures neg x 5 days. -he has persistent leukocytosis--wbc 26,and tachycardia  -cont to monitor vitals. -CBC, CMP daily. #3: Hypokalemia:  -replaced. #4: Hypertensive urgency:  -improved. -cont amlodipine, lisinopril, and carvedilol. #5: Diabetes Mellitus Type 2:  -stable, continue sliding scale and accu-checks prior to meals and bedtime. #6: BPH;  -chronic issue, continue Flomax     #7: Hx of Tobacco Use:  -smoking cessation education              Code Status: Full  Prophylaxis:  Lovenox Sq  Total time spent: 35 minutes  Plan of care discussed with patient and nursing staff.           Signed By: Bola Maldonado NP     October 29, 2022

## 2022-10-29 NOTE — DISCHARGE SUMMARY
Physician Discharge Summary       Patient: Mortimer Carney MRN: [id number]     YOB: 1960  Age: 58 y.o. Sex: male    PCP: None    Allergies: Patient has no known allergies. Admit date: 10/24/2022  Admitting Provider: Candelaria Monae MD    Discharge date: 10/29/2022  Discharging Provider: Prabha Arguello NP    * Admission Diagnoses: Pneumonia [J18.9]  Respiratory failure [facility]) [J96.90]    * Discharge Diagnoses:    Hospital Problems as of 10/29/2022 Never Reviewed            Codes Class Noted - Resolved POA    * (Principal) Sepsis ([facility] ICD-10-CM: A41.9  ICD-9-CM: 038.9, 995.91  10/24/2022 - Present Yes        Acute respiratory failure with hypoxia (HCC) ICD-10-CM: J96.01  ICD-9-CM: 518.81  10/24/2022 - Present Unknown        Hypokalemia ICD-10-CM: E87.6  ICD-9-CM: 276.8  10/24/2022 - Present Unknown        Primary hypertension ICD-10-CM: I10  ICD-9-CM: 401.9  10/24/2022 - Present Unknown        Respiratory failure ([facility] ICD-10-CM: J96.90  ICD-9-CM: 518.81  10/24/2022 - Present Unknown        Pneumonia ICD-10-CM: J18.9  ICD-9-CM: 948  10/24/2022 - Present Unknown        RESOLVED: Respiratory failure ([facility] ICD-10-CM: J96.90  ICD-9-CM: 518.81  10/24/2022 - 10/24/2022 Unknown        RESOLVED: Pneumonia ICD-10-CM: J18.9  ICD-9-CM: 486  10/24/2022 - 10/24/2022 Unknown           Admit HPI of 10/24/2022:    Mortimer Carney is a 58 y.o. male with a past medical history of Hypertension and BPH, patient presents to this facility with a chief complaint of weakness, fever, chills, cough, and shortness of breath. Patient reports that he was diagnosed with COVID 3 weeks ago, but began to feel better, but about a week and a half ago is when the shortness of breath, cough, fever, and chills started. Patient denies chest pain, palpitations, nausea, vomiting, diarrhea, and abdominal pain.  Upon arrival to the ED patient was found to be tachycardic, febrile, with a WBC of 28.4, at that time chest x-ray shown nodular bilateral parenchymal infiltrates  and CT chest shown Interval development of numerous bilateral lung parenchymal nodules, with largest nodule right lung apex maximally 2.8 cm with air bronchograms. Patient reports that he was told about the pulmonary noduleswas present in July of this year, he was supposed to had a follow-up with the PCP, but patient reports that he never received a follow-up appointment. Patient recommended to make sure he follow-up with his PCP to discuss the findings on the CT scan. Hospital medicine consulted for admission. Patient assessed at the bedside, he is alert and oriented x 4, patient is stable on room air, there is no noted distress. Patient agrees for a diagnosis of sepsis likely secondary to pneumonia, treatment to include IV antibiotics. Hospital Course: As below--  #1: Acute respiratory failure with hypoxia (HCC)  -2/2 keli lobe pneumonia,   -improving, Now on room air.  -rapid covid test 10/24/22--negative   -ddimer 6.4--chest CTA shows no PE shows extensive pulmonary nodules, largest 2.8cm, ?metastatic/inflammatory/infectious etiology, and small left lung cavitation, AFB smears and TB spot test pending. -pulmonologist at [facility] contacted 10/26/22, and he does not recommend bronchoscopy at that time, advised IV abx.   -prior incidental findings of pulmonary lung nodules--May 2022, but no work up for this. Hx of tobacco smoker 1/2 pk/day>30yrs. -he continues to have persistent leukocytosis--WBC 26, and tachycardia, called transfer center today for pulmonology access, spoke with hospitalist at [facility], Dr Kristy An, who accepts patient for transfer. #2: Sepsis:  -POA, suspect 2/2 bilateral pneumonia  -lactic was 1.6, procal 1.63 on admit.   -cont IV abx with vanc and zosyn  -cont O2 supp prn to keep sats> 92%  -repeat lactic today is 1.3, blood cultures neg x 5 days.   -he has persistent leukocytosis--wbc 26,and tachycardia  -cont to monitor vitals. -CBC, CMP daily. #3: Hypokalemia:  -replaced with . #4: Hypertensive urgency:  -improved. -cont amlodipine, lisinopril, and carvedilol. #5: Diabetes Mellitus Type 2:  -stable, continue sliding scale and accu-checks prior to meals and bedtime. #6: BPH;  -chronic issue, continue Flomax     #7: Hx of Tobacco Use:  -smoking cessation education                  Code Status: Full  Prophylaxis:  Lovenox Sq  Total time spent: 35 minutes  Plan of care discussed with patient, wife  and nursing staff.            Current Facility-Administered Medications:     mometasone-formoterol (DULERA) 200mcg-5mcg/puff, 2 Puff, Inhalation, BID RT, Oba, Oluwabunmi S, NP    acetylcysteine (MUCOMYST) 200 mg/mL (20 %) solution 200 mg, 200 mg, Nebulization, QID RT, Oba, Oluwabunmi S, NP, 200 mg at 10/29/22 1552    albuterol (PROVENTIL VENTOLIN) nebulizer solution 2.5 mg, 2.5 mg, Nebulization, QID RT, Oba, Oluwabunmi S, NP, 2.5 mg at 10/29/22 1551    vancomycin (VANCOCIN) 1,000 mg in 0.9% sodium chloride 250 mL ([id number]), 1,000 mg, IntraVENous, Q8H, Leonardo Littlejohn MD, Last Rate: 250 mL/hr at 10/29/22 1351, 1,000 mg at 10/29/22 1351    lisinopriL (PRINIVIL, ZESTRIL) tablet 10 mg, 10 mg, Oral, DAILY, Oba, Oluwabunmi S, NP, 10 mg at 10/29/22 0911    carvediloL (COREG) tablet 6.25 mg, 6.25 mg, Oral, BID WITH MEALS, Oba, Oluwabunmi S, NP, 6.25 mg at 10/29/22 1649    piperacillin-tazobactam (ZOSYN) 3.375 g in 0.9% sodium chloride (MBP/ADV) 100 mL MBP, 3.375 g, IntraVENous, Q8H, Oba, Oluwabunmi S, NP, Last Rate: 25 mL/hr at 10/29/22 1439, 3.375 g at 10/29/22 1439    VANCOMYCIN INFORMATION NOTE 1 Each, 1 Each, Other, Rx Dosing/Monitoring, Sandrine Salmeron, NP    guaiFENesin (ROBITUSSIN) 100 mg/5 mL oral liquid 100 mg, 100 mg, Oral, Q4H PRN, Ronaldo Grigsby MD, 100 mg at 10/28/22 2109    sodium chloride (NS) flush 5-40 mL, 5-40 mL, IntraVENous, Q8H, Idania Chandra, ACNP, 10 mL at 10/29/22 1402    sodium chloride (NS) flush 5-40 mL, 5-40 mL, IntraVENous, PRN, [place], Idania S, ACNP    acetaminophen (TYLENOL) tablet 650 mg, 650 mg, Oral, Q6H PRN, 650 mg at 10/29/22 0515 **OR** acetaminophen (TYLENOL) suppository 650 mg, 650 mg, Rectal, Q6H PRN, Corazon, Idania S, ACNP    polyethylene glycol (MIRALAX) packet 17 g, 17 g, Oral, DAILY PRN, Corazon, Idania S, ACNP    ondansetron (ZOFRAN ODT) tablet 4 mg, 4 mg, Oral, Q8H PRN **OR** ondansetron (ZOFRAN) injection 4 mg, 4 mg, IntraVENous, Q6H PRN, Corazon Idania S, ACNP, 4 mg at 10/25/22 1728    enoxaparin (LOVENOX) injection 40 mg, 40 mg, SubCUTAneous, DAILY, [place], Idania S, ACNP, 40 mg at 10/29/22 0911    amLODIPine (NORVASC) tablet 10 mg, 10 mg, Oral, DAILY, [place], Idania S, ACNP, 10 mg at 10/29/22 0911    tamsulosin (FLOMAX) capsule 0.4 mg, 0.4 mg, Oral, QPM, Corazon, Idania S, ACNP, 0.4 mg at 10/29/22 1747    hydrALAZINE (APRESOLINE) 20 mg/mL injection 10 mg, 10 mg, IntraVENous, Q6H PRN, Corazon, Idania S, ACNP, 10 mg at 10/27/22 0441    guaiFENesin ER (MUCINEX) tablet 600 mg, 600 mg, Oral, Q12H, [place], Idania S, ACNP, 600 mg at 10/29/22 0911    albuterol (PROVENTIL HFA, VENTOLIN HFA, PROAIR HFA) inhaler 2 Puff, 2 Puff, Inhalation, Q4H PRN, Corazon, Idania S, ACNP    glucose chewable tablet 16 g, 4 Tablet, Oral, PRN, Corazon, Idania S, ACNP    glucagon (GLUCAGEN) injection 1 mg, 1 mg, IntraMUSCular, PRN, Corazon, Idania S, ACNP    insulin lispro (HUMALOG) injection, , SubCUTAneous, AC&HS, Jacqui Brown, ACNP, 4 Units at 10/29/22 1650     Procedures: None  Consults: Needs pulmonology access    Discharge Exam:  General:  Alert, awake, orients x 4, on room air, cooperative, no acute distress. Head:  Normocephalic, without obvious abnormality, atraumatic. Eyes:  Conjunctivae/corneas clear. Pupils equal, round, reactive to light. Extraocular movements intact. No icterus. Lungs:   Severely diminished breath sounds, to auscultation bilaterally. Chest wall:  No tenderness or deformity. Heart:  Tachycardia, S1, S2, no murmur, click, rub, or gallop. Abdomen:   Soft, non-tender. Bowel sounds active, no palpable masses. Extremities: Extremities normal, atraumatic, no cyanosis or edema. Pulses: 2+ and symmetric all extremities. Skin: Skin color, texture, turgor normal. No rashes or lesions. Lymph nodes: Cervical, supraclavicular, and axillary nodes normal.   Neurologic: CNII-XII intact. Normal strength, sensation, and reflexes throughout. * Discharge Condition: stable  * Disposition:  [address] [place] [address] for Pulmonology and/or Infectious Disease access    Discharge Medications:  Current Discharge Medication List          * Follow-up Care/Patient Instructions:   Activity: Activity as tolerated  Diet: Cardiac Diet      Follow-up Information    None         Signed:  Loan Sanford NP  10/29/2022  5:48 PM

## 2022-10-30 LAB
ACID FAST STN SPEC: NEGATIVE
ALBUMIN SERPL-MCNC: 1.7 G/DL (ref 3.5–5)
ALBUMIN/GLOB SERPL: 0.3 {RATIO} (ref 1.1–2.2)
ALP SERPL-CCNC: 138 U/L (ref 45–117)
ALT SERPL-CCNC: 36 U/L (ref 12–78)
ANION GAP SERPL CALC-SCNC: 6 MMOL/L (ref 5–15)
AST SERPL W P-5'-P-CCNC: 32 U/L (ref 15–37)
BACTERIA SPEC CULT: NORMAL
BACTERIA SPEC CULT: NORMAL
BASOPHILS # BLD: 0 K/UL (ref 0–0.1)
BASOPHILS NFR BLD: 0 % (ref 0–1)
BILIRUB SERPL-MCNC: 0.4 MG/DL (ref 0.2–1)
BUN SERPL-MCNC: 7 MG/DL (ref 6–20)
BUN/CREAT SERPL: 10 (ref 12–20)
CA-I BLD-MCNC: 8.8 MG/DL (ref 8.5–10.1)
CHLORIDE SERPL-SCNC: 102 MMOL/L (ref 97–108)
CO2 SERPL-SCNC: 30 MMOL/L (ref 21–32)
CREAT SERPL-MCNC: 0.7 MG/DL (ref 0.7–1.3)
CRP SERPL-MCNC: 18 MG/DL (ref 0–0.6)
DIFFERENTIAL METHOD BLD: ABNORMAL
EOSINOPHIL # BLD: 0.1 K/UL (ref 0–0.4)
EOSINOPHIL NFR BLD: 1 % (ref 0–7)
ERYTHROCYTE [DISTWIDTH] IN BLOOD BY AUTOMATED COUNT: 15.3 % (ref 11.5–14.5)
ERYTHROCYTE [SEDIMENTATION RATE] IN BLOOD: 128 MM/HR (ref 0–20)
GLOBULIN SER CALC-MCNC: 5.4 G/DL (ref 2–4)
GLUCOSE BLD STRIP.AUTO-MCNC: 176 MG/DL (ref 65–100)
GLUCOSE BLD STRIP.AUTO-MCNC: 194 MG/DL (ref 65–100)
GLUCOSE BLD STRIP.AUTO-MCNC: 246 MG/DL (ref 65–100)
GLUCOSE BLD STRIP.AUTO-MCNC: 291 MG/DL (ref 65–100)
GLUCOSE SERPL-MCNC: 179 MG/DL (ref 65–100)
HCT VFR BLD AUTO: 28.9 % (ref 36.6–50.3)
HGB BLD-MCNC: 8.9 G/DL (ref 12.1–17)
IMM GRANULOCYTES # BLD AUTO: 0.1 K/UL (ref 0–0.04)
IMM GRANULOCYTES NFR BLD AUTO: 1 % (ref 0–0.5)
LYMPHOCYTES # BLD: 1.9 K/UL (ref 0.8–3.5)
LYMPHOCYTES NFR BLD: 10 % (ref 12–49)
MCH RBC QN AUTO: 25.1 PG (ref 26–34)
MCHC RBC AUTO-ENTMCNC: 30.8 G/DL (ref 30–36.5)
MCV RBC AUTO: 81.6 FL (ref 80–99)
MONOCYTES # BLD: 1.4 K/UL (ref 0–1)
MONOCYTES NFR BLD: 7 % (ref 5–13)
MYCOBACTERIUM SPEC QL CULT: NORMAL
NEUTS SEG # BLD: 16.1 K/UL (ref 1.8–8)
NEUTS SEG NFR BLD: 81 % (ref 32–75)
NRBC # BLD: 0.02 K/UL (ref 0–0.01)
NRBC BLD-RTO: 0.1 PER 100 WBC
PERFORMED BY, TECHID: ABNORMAL
PHOSPHATE SERPL-MCNC: 2.2 MG/DL (ref 2.6–4.7)
PLATELET # BLD AUTO: 336 K/UL (ref 150–400)
PMV BLD AUTO: 10.4 FL (ref 8.9–12.9)
POTASSIUM SERPL-SCNC: 2.9 MMOL/L (ref 3.5–5.1)
PROCALCITONIN SERPL-MCNC: 0.44 NG/ML
PROT SERPL-MCNC: 7.1 G/DL (ref 6.4–8.2)
RBC # BLD AUTO: 3.54 M/UL (ref 4.1–5.7)
SODIUM SERPL-SCNC: 138 MMOL/L (ref 136–145)
SPECIAL REQUESTS,SREQ: NORMAL
SPECIAL REQUESTS,SREQ: NORMAL
SPECIMEN PREPARATION: NORMAL
SPECIMEN SOURCE: NORMAL
TSH SERPL DL<=0.05 MIU/L-ACNC: 1.91 UIU/ML (ref 0.36–3.74)
WBC # BLD AUTO: 19.7 K/UL (ref 4.1–11.1)

## 2022-10-30 PROCEDURE — 87070 CULTURE OTHR SPECIMN AEROBIC: CPT

## 2022-10-30 PROCEDURE — 74011636637 HC RX REV CODE- 636/637: Performed by: LICENSED PRACTICAL NURSE

## 2022-10-30 PROCEDURE — 74011000250 HC RX REV CODE- 250: Performed by: INTERNAL MEDICINE

## 2022-10-30 PROCEDURE — 99223 1ST HOSP IP/OBS HIGH 75: CPT | Performed by: INTERNAL MEDICINE

## 2022-10-30 PROCEDURE — 84443 ASSAY THYROID STIM HORMONE: CPT

## 2022-10-30 PROCEDURE — 83036 HEMOGLOBIN GLYCOSYLATED A1C: CPT

## 2022-10-30 PROCEDURE — 74011000250 HC RX REV CODE- 250

## 2022-10-30 PROCEDURE — 87102 FUNGUS ISOLATION CULTURE: CPT

## 2022-10-30 PROCEDURE — 84100 ASSAY OF PHOSPHORUS: CPT

## 2022-10-30 PROCEDURE — 74011250636 HC RX REV CODE- 250/636: Performed by: INTERNAL MEDICINE

## 2022-10-30 PROCEDURE — 80053 COMPREHEN METABOLIC PANEL: CPT

## 2022-10-30 PROCEDURE — 84145 PROCALCITONIN (PCT): CPT

## 2022-10-30 PROCEDURE — 74011250637 HC RX REV CODE- 250/637: Performed by: LICENSED PRACTICAL NURSE

## 2022-10-30 PROCEDURE — 85025 COMPLETE CBC W/AUTO DIFF WBC: CPT

## 2022-10-30 PROCEDURE — 82962 GLUCOSE BLOOD TEST: CPT

## 2022-10-30 PROCEDURE — 74011636637 HC RX REV CODE- 636/637: Performed by: HOSPITALIST

## 2022-10-30 PROCEDURE — 74011000250 HC RX REV CODE- 250: Performed by: HOSPITALIST

## 2022-10-30 PROCEDURE — 82306 VITAMIN D 25 HYDROXY: CPT

## 2022-10-30 PROCEDURE — 65270000029 HC RM PRIVATE

## 2022-10-30 PROCEDURE — 74011250636 HC RX REV CODE- 250/636: Performed by: HOSPITALIST

## 2022-10-30 PROCEDURE — 85652 RBC SED RATE AUTOMATED: CPT

## 2022-10-30 PROCEDURE — 74011000258 HC RX REV CODE- 258: Performed by: HOSPITALIST

## 2022-10-30 PROCEDURE — 74011250637 HC RX REV CODE- 250/637: Performed by: HOSPITALIST

## 2022-10-30 PROCEDURE — 94640 AIRWAY INHALATION TREATMENT: CPT

## 2022-10-30 PROCEDURE — P9047 ALBUMIN (HUMAN), 25%, 50ML: HCPCS | Performed by: INTERNAL MEDICINE

## 2022-10-30 PROCEDURE — 86140 C-REACTIVE PROTEIN: CPT

## 2022-10-30 PROCEDURE — 74011250636 HC RX REV CODE- 250/636: Performed by: LICENSED PRACTICAL NURSE

## 2022-10-30 RX ORDER — INSULIN LISPRO 100 [IU]/ML
INJECTION, SOLUTION INTRAVENOUS; SUBCUTANEOUS
Status: DISCONTINUED | OUTPATIENT
Start: 2022-10-30 | End: 2022-11-02 | Stop reason: HOSPADM

## 2022-10-30 RX ORDER — ENOXAPARIN SODIUM 100 MG/ML
30 INJECTION SUBCUTANEOUS EVERY 12 HOURS
Status: DISCONTINUED | OUTPATIENT
Start: 2022-10-30 | End: 2022-11-02 | Stop reason: HOSPADM

## 2022-10-30 RX ORDER — ALBUMIN HUMAN 250 G/1000ML
12.5 SOLUTION INTRAVENOUS EVERY 6 HOURS
Status: COMPLETED | OUTPATIENT
Start: 2022-10-30 | End: 2022-10-31

## 2022-10-30 RX ORDER — ALBUTEROL SULFATE 2.5 MG/.5ML
SOLUTION RESPIRATORY (INHALATION)
Status: COMPLETED
Start: 2022-10-30 | End: 2022-10-30

## 2022-10-30 RX ORDER — POTASSIUM CHLORIDE 750 MG/1
40 TABLET, FILM COATED, EXTENDED RELEASE ORAL
Status: COMPLETED | OUTPATIENT
Start: 2022-10-30 | End: 2022-10-30

## 2022-10-30 RX ORDER — FUROSEMIDE 10 MG/ML
40 INJECTION INTRAMUSCULAR; INTRAVENOUS 2 TIMES DAILY
Status: DISCONTINUED | OUTPATIENT
Start: 2022-10-30 | End: 2022-11-02 | Stop reason: HOSPADM

## 2022-10-30 RX ORDER — HYDRALAZINE HYDROCHLORIDE 20 MG/ML
20 INJECTION INTRAMUSCULAR; INTRAVENOUS
Status: DISCONTINUED | OUTPATIENT
Start: 2022-10-30 | End: 2022-11-02 | Stop reason: HOSPADM

## 2022-10-30 RX ORDER — MAGNESIUM SULFATE 100 %
4 CRYSTALS MISCELLANEOUS AS NEEDED
Status: DISCONTINUED | OUTPATIENT
Start: 2022-10-30 | End: 2022-10-30 | Stop reason: SDUPTHER

## 2022-10-30 RX ORDER — ACETYLCYSTEINE 200 MG/ML
200 SOLUTION ORAL; RESPIRATORY (INHALATION)
Status: DISCONTINUED | OUTPATIENT
Start: 2022-10-30 | End: 2022-11-02 | Stop reason: HOSPADM

## 2022-10-30 RX ORDER — INSULIN GLARGINE 100 [IU]/ML
15 INJECTION, SOLUTION SUBCUTANEOUS
Status: DISCONTINUED | OUTPATIENT
Start: 2022-10-30 | End: 2022-11-02 | Stop reason: HOSPADM

## 2022-10-30 RX ORDER — ALBUTEROL SULFATE 2.5 MG/.5ML
2.5 SOLUTION RESPIRATORY (INHALATION)
Status: DISCONTINUED | OUTPATIENT
Start: 2022-10-30 | End: 2022-11-02 | Stop reason: HOSPADM

## 2022-10-30 RX ORDER — ALBUMIN HUMAN 250 G/1000ML
12.5 SOLUTION INTRAVENOUS EVERY 6 HOURS
Status: DISCONTINUED | OUTPATIENT
Start: 2022-10-30 | End: 2022-10-30

## 2022-10-30 RX ADMIN — SODIUM CHLORIDE, PRESERVATIVE FREE 10 ML: 5 INJECTION INTRAVENOUS at 22:24

## 2022-10-30 RX ADMIN — LISINOPRIL 10 MG: 10 TABLET ORAL at 08:00

## 2022-10-30 RX ADMIN — CARVEDILOL 6.25 MG: 3.12 TABLET, FILM COATED ORAL at 08:00

## 2022-10-30 RX ADMIN — METHYLPREDNISOLONE SODIUM SUCCINATE 40 MG: 40 INJECTION, POWDER, FOR SOLUTION INTRAMUSCULAR; INTRAVENOUS at 17:19

## 2022-10-30 RX ADMIN — FUROSEMIDE 40 MG: 10 INJECTION, SOLUTION INTRAMUSCULAR; INTRAVENOUS at 21:21

## 2022-10-30 RX ADMIN — TAMSULOSIN HYDROCHLORIDE 0.4 MG: 0.4 CAPSULE ORAL at 17:19

## 2022-10-30 RX ADMIN — ALBUTEROL SULFATE 2.5 MG: 2.5 SOLUTION RESPIRATORY (INHALATION) at 14:02

## 2022-10-30 RX ADMIN — ALBUMIN (HUMAN) 12.5 G: 0.25 INJECTION, SOLUTION INTRAVENOUS at 21:21

## 2022-10-30 RX ADMIN — PIPERACILLIN AND TAZOBACTAM 3.38 G: 3; .375 INJECTION, POWDER, FOR SOLUTION INTRAVENOUS at 00:37

## 2022-10-30 RX ADMIN — ACETYLCYSTEINE 200 MG: 200 SOLUTION ORAL; RESPIRATORY (INHALATION) at 20:42

## 2022-10-30 RX ADMIN — SODIUM CHLORIDE, PRESERVATIVE FREE 10 ML: 5 INJECTION INTRAVENOUS at 00:43

## 2022-10-30 RX ADMIN — POTASSIUM CHLORIDE 40 MEQ: 750 TABLET, FILM COATED, EXTENDED RELEASE ORAL at 19:13

## 2022-10-30 RX ADMIN — AMLODIPINE BESYLATE 10 MG: 5 TABLET ORAL at 08:00

## 2022-10-30 RX ADMIN — INSULIN LISPRO 3 UNITS: 100 INJECTION, SOLUTION INTRAVENOUS; SUBCUTANEOUS at 12:00

## 2022-10-30 RX ADMIN — ACETYLCYSTEINE 200 MG: 200 SOLUTION ORAL; RESPIRATORY (INHALATION) at 13:56

## 2022-10-30 RX ADMIN — ALBUTEROL SULFATE 2.5 MG: 2.5 SOLUTION RESPIRATORY (INHALATION) at 08:00

## 2022-10-30 RX ADMIN — PIPERACILLIN AND TAZOBACTAM 3.38 G: 3; .375 INJECTION, POWDER, FOR SOLUTION INTRAVENOUS at 16:00

## 2022-10-30 RX ADMIN — BUDESONIDE AND FORMOTEROL FUMARATE DIHYDRATE 2 PUFF: 160; 4.5 AEROSOL RESPIRATORY (INHALATION) at 07:50

## 2022-10-30 RX ADMIN — ALBUTEROL SULFATE 2.5 MG: 2.5 SOLUTION RESPIRATORY (INHALATION) at 07:53

## 2022-10-30 RX ADMIN — VANCOMYCIN HYDROCHLORIDE 1250 MG: 1.25 INJECTION, POWDER, LYOPHILIZED, FOR SOLUTION INTRAVENOUS at 17:18

## 2022-10-30 RX ADMIN — HYDRALAZINE HYDROCHLORIDE 20 MG: 20 INJECTION, SOLUTION INTRAMUSCULAR; INTRAVENOUS at 21:22

## 2022-10-30 RX ADMIN — POTASSIUM CHLORIDE 40 MEQ: 750 TABLET, FILM COATED, EXTENDED RELEASE ORAL at 17:24

## 2022-10-30 RX ADMIN — SODIUM CHLORIDE, PRESERVATIVE FREE 10 ML: 5 INJECTION INTRAVENOUS at 14:51

## 2022-10-30 RX ADMIN — ENOXAPARIN SODIUM 30 MG: 100 INJECTION SUBCUTANEOUS at 17:19

## 2022-10-30 RX ADMIN — METHYLPREDNISOLONE SODIUM SUCCINATE 40 MG: 40 INJECTION, POWDER, FOR SOLUTION INTRAMUSCULAR; INTRAVENOUS at 10:06

## 2022-10-30 RX ADMIN — CARVEDILOL 6.25 MG: 3.12 TABLET, FILM COATED ORAL at 16:00

## 2022-10-30 RX ADMIN — ALBUTEROL SULFATE 2.5 MG: 2.5 SOLUTION RESPIRATORY (INHALATION) at 20:42

## 2022-10-30 RX ADMIN — BUDESONIDE AND FORMOTEROL FUMARATE DIHYDRATE 2 PUFF: 160; 4.5 AEROSOL RESPIRATORY (INHALATION) at 20:42

## 2022-10-30 RX ADMIN — ENOXAPARIN SODIUM 30 MG: 100 INJECTION SUBCUTANEOUS at 05:44

## 2022-10-30 RX ADMIN — INSULIN LISPRO 3 UNITS: 100 INJECTION, SOLUTION INTRAVENOUS; SUBCUTANEOUS at 07:59

## 2022-10-30 RX ADMIN — VANCOMYCIN HYDROCHLORIDE 1250 MG: 1.25 INJECTION, POWDER, LYOPHILIZED, FOR SOLUTION INTRAVENOUS at 10:07

## 2022-10-30 RX ADMIN — INSULIN GLARGINE 15 UNITS: 100 INJECTION, SOLUTION SUBCUTANEOUS at 21:25

## 2022-10-30 RX ADMIN — GUAIFENESIN 600 MG: 600 TABLET, EXTENDED RELEASE ORAL at 21:22

## 2022-10-30 RX ADMIN — INSULIN LISPRO 4 UNITS: 100 INJECTION, SOLUTION INTRAVENOUS; SUBCUTANEOUS at 17:21

## 2022-10-30 RX ADMIN — VANCOMYCIN HYDROCHLORIDE 1250 MG: 1.25 INJECTION, POWDER, LYOPHILIZED, FOR SOLUTION INTRAVENOUS at 02:24

## 2022-10-30 RX ADMIN — ACETYLCYSTEINE 200 MG: 200 SOLUTION ORAL; RESPIRATORY (INHALATION) at 07:53

## 2022-10-30 RX ADMIN — PIPERACILLIN AND TAZOBACTAM 3.38 G: 3; .375 INJECTION, POWDER, FOR SOLUTION INTRAVENOUS at 08:01

## 2022-10-30 RX ADMIN — GUAIFENESIN 600 MG: 600 TABLET, EXTENDED RELEASE ORAL at 08:00

## 2022-10-30 NOTE — PROGRESS NOTES
Hospitalist Progress Note            Daily Progress Note: 10/30/2022 4:33 PM  Hospital course:     58 y.o. male with history of diabetes, hypertension, and COVID 3 weeks ago, that presents to the referring facility via  emergency room complaining of cough for more than 2 weeks that was gradually worsening with shortness of breath that started 1 week ago along with fever. He reports that his symptoms worsened and he began to develop a fever/chills and lose his appetite. On evaluation in the emergency room he is found with nodular bilateral parenchymal infiltrates and a WBC of 28.4. CT of the chest revealed worsening parenchymal nodules with air bronchograms. He was admitted and started on antibiotics and IV fluids. Unable to perform the necessary treatment the patient was transferred from [facility] to [facility]. Pulmonary and ID consulted. He was started on IVF, Zosyn and vancomycin. Labs show an elevated WBC, normocytic anemia, hypokalemia, elevated glucose,elevated BNP, hypoalbuminemia, and an elevated CRP and procal.        Subjective:     Patient is seen and examined at bedside. Wife is present in the room. Patient is in high spirits. He notes that he does have some shortness of breath but denies chest pain, abdominal pain dizziness headaches,nausea,vomiting. Inquired about a referral for primary care since his retired recommended Yohan Avilez.      Assessment/Plan:   Active Problems:    Sepsis ([facility] [address] (10/24/2022)      Acute respiratory failure with hypoxia ([address] (10/24/2022)      Hypokalemia (10/24/2022)      Cavitary pneumonia (10/24/2022)      Pneumonia (10/29/2022)  #Sepsis secondary to cavitary PNA, productive cough  --Continue Zosyn and Vancomycin  --Continue Mucinex and Solumedrol  --Started on 2L NC  --CT revealed numerous bilateral lung parenchymal nodules, with  largest nodule right lung apex maximally 2.8 cm with air bronchograms  --Sputum cultures pending  --Pulmonary consulted and following  --ID consulted    #hypoalbuminemia  --gave albumin    #elevated BNP, CHF exacerbation?  --ordered echo  --Started Lasix 40mg    #Normocytic Anemia  --Stable in the 8s.     #COPD  --Continue home medications    #Diabetes, uncontrolled  --on insulin; 15 units Lantus plus sliding scale   --A1 pending     #HTN  --Continue home medications  --Hydralazine added for SBP >160    #Hypokalemia  --Administer Potassium  --Continue to monitor    #BPH  --continue tamulosin    DVT Prophylaxis: lovenox  Code Status: Full Code  POA/NOK:    Disposition and discharge barriers:   IV abx   Care Plan discussed with: patient, and wife    Current Facility-Administered Medications   Medication Dose Route Frequency    vancomycin (VANCOCIN) 1,250 mg in 0.9% sodium chloride 250 mL ([id number])  1,250 mg IntraVENous Q8H    [START ON 10/31/2022] VANCOMYCIN TROUGH LEVEL DUE AT 0800 ON 10/31   Other ONCE    enoxaparin (LOVENOX) injection 30 mg  30 mg SubCUTAneous Q12H    piperacillin-tazobactam (ZOSYN) 3.375 g in 0.9% sodium chloride (MBP/ADV) 100 mL MBP  3.375 g IntraVENous Q8H    acetylcysteine (MUCOMYST) 200 mg/mL (20 %) solution 200 mg  200 mg Nebulization Q6HWA RT    albuterol CONCENTRATE 2.5mg/0.5 mL neb soln  2.5 mg Nebulization Q6HWA RT    methylPREDNISolone (PF) (SOLU-MEDROL) injection 40 mg  40 mg IntraVENous Q8H    potassium chloride SR (KLOR-CON 10) tablet 40 mEq  40 mEq Oral Q1H    insulin glargine (LANTUS) injection 15 Units  15 Units SubCUTAneous QHS    insulin lispro (HUMALOG) injection   SubCUTAneous TIDAC    furosemide (LASIX) injection 40 mg  40 mg IntraVENous BID    albumin human 25% (BUMINATE) solution 12.5 g  12.5 g IntraVENous Q6H    VANCOMYCIN INFORMATION NOTE 1 Each  1 Each Other Rx Dosing/Monitoring    glucose chewable tablet 16 g  4 Tablet Oral PRN    glucagon (GLUCAGEN) injection 1 mg  1 mg IntraMUSCular PRN    dextrose 10% infusion 0-250 mL  0-250 mL IntraVENous PRN    tamsulosin (FLOMAX) capsule 0.4 mg  0.4 mg Oral QPM    lisinopriL (PRINIVIL, ZESTRIL) tablet 10 mg  10 mg Oral DAILY    guaiFENesin ER (MUCINEX) tablet 600 mg  600 mg Oral Q12H    carvediloL (COREG) tablet 6.25 mg  6.25 mg Oral BID WITH MEALS    amLODIPine (NORVASC) tablet 10 mg  10 mg Oral DAILY    sodium chloride (NS) flush 5-40 mL  5-40 mL IntraVENous Q8H    sodium chloride (NS) flush 5-40 mL  5-40 mL IntraVENous PRN    acetaminophen (TYLENOL) tablet 650 mg  650 mg Oral Q4H PRN    budesonide-formoteroL (SYMBICORT) 160-4.5 mcg/actuation HFA inhaler 2 Puff  2 Puff Inhalation BID RT        REVIEW OF SYSTEMS    Review of Systems   Constitutional:  Positive for malaise/fatigue. Negative for fever and weight loss. Respiratory:  Positive for cough, sputum production (whitish in color) and shortness of breath. Cardiovascular:  Negative for chest pain and palpitations. Gastrointestinal:  Negative for abdominal pain, nausea and vomiting. Musculoskeletal:  Negative for myalgias. Neurological:  Negative for dizziness and headaches. Objective:     Visit Vitals  BP (!) 168/91 (BP 1 Location: Left upper arm, BP Patient Position: At rest)   Pulse 100   Temp 99 °F (37.2 °C)   Resp 25   Ht 5' 7\" (1.702 m)   Wt 103 kg (227 lb)   SpO2 92%   BMI 35.55 kg/m²      O2 Device: None (Room air)    Temp (24hrs), Av.5 °F (36.9 °C), Min:97.9 °F (36.6 °C), Max:99 °F (37.2 °C)      10/30 07 - 10/30 1900  In: 675 [P.O.:675]  Out: -   No intake/output data recorded. PHYSICAL EXAM:    Physical Exam  Vitals and nursing note reviewed. Constitutional:       General: He is not in acute distress. Appearance: He is obese. He is ill-appearing. HENT:      Head: Normocephalic and atraumatic. Cardiovascular:      Rate and Rhythm: Regular rhythm. Tachycardia present. Heart sounds: Normal heart sounds. No murmur heard. Pulmonary:      Effort: No respiratory distress. Breath sounds: Rhonchi present. Chest:      Chest wall: No tenderness.    Abdominal: General: Abdomen is flat. There is no distension. Palpations: Abdomen is soft. Tenderness: There is no abdominal tenderness. Musculoskeletal:         General: No tenderness or signs of injury. Cervical back: Normal range of motion. Neurological:      General: No focal deficit present. Mental Status: He is alert and oriented to person, place, and time. Mental status is at baseline. Psychiatric:         Mood and Affect: Mood normal.         Behavior: Behavior normal.         Thought Content: Thought content normal.         Judgment: Judgment normal.        Data Review    Recent Results (from the past 24 hour(s))   GLUCOSE, POC    Collection Time: 10/30/22  7:40 AM   Result Value Ref Range    Glucose (POC) 176 (H) 65 - 100 mg/dL    Performed by Terrie Mcallister    METABOLIC PANEL, COMPREHENSIVE    Collection Time: 10/30/22  9:26 AM   Result Value Ref Range    Sodium 138 136 - 145 mmol/L    Potassium 2.9 (L) 3.5 - 5.1 mmol/L    Chloride 102 97 - 108 mmol/L    CO2 30 21 - 32 mmol/L    Anion gap 6 5 - 15 mmol/L    Glucose 179 (H) 65 - 100 mg/dL    BUN 7 6 - 20 mg/dL    Creatinine 0.70 0.70 - 1.30 mg/dL    BUN/Creatinine ratio 10 (L) 12 - 20      eGFR >60 >60 ml/min/1.73m2    Calcium 8.8 8.5 - 10.1 mg/dL    Bilirubin, total 0.4 0.2 - 1.0 mg/dL    AST (SGOT) 32 15 - 37 U/L    ALT (SGPT) 36 12 - 78 U/L    Alk.  phosphatase 138 (H) 45 - 117 U/L    Protein, total 7.1 6.4 - 8.2 g/dL    Albumin 1.7 (L) 3.5 - 5.0 g/dL    Globulin 5.4 (H) 2.0 - 4.0 g/dL    A-G Ratio 0.3 (L) 1.1 - 2.2     CBC WITH AUTOMATED DIFF    Collection Time: 10/30/22  9:26 AM   Result Value Ref Range    WBC 19.7 (H) 4.1 - 11.1 K/uL    RBC 3.54 (L) 4.10 - 5.70 M/uL    HGB 8.9 (L) 12.1 - 17.0 g/dL    HCT 28.9 (L) 36.6 - 50.3 %    MCV 81.6 80.0 - 99.0 FL    MCH 25.1 (L) 26.0 - 34.0 PG    MCHC 30.8 30.0 - 36.5 g/dL    RDW 15.3 (H) 11.5 - 14.5 %    PLATELET 804 463 - 156 K/uL    MPV 10.4 8.9 - 12.9 FL    NRBC 0.1 (H) 0.0  WBC ABSOLUTE NRBC 0.02 (H) 0.00 - 0.01 K/uL    NEUTROPHILS 81 (H) 32 - 75 %    LYMPHOCYTES 10 (L) 12 - 49 %    MONOCYTES 7 5 - 13 %    EOSINOPHILS 1 0 - 7 %    BASOPHILS 0 0 - 1 %    IMMATURE GRANULOCYTES 1 (H) 0 - 0.5 %    ABS. NEUTROPHILS 16.1 (H) 1.8 - 8.0 K/UL    ABS. LYMPHOCYTES 1.9 0.8 - 3.5 K/UL    ABS. MONOCYTES 1.4 (H) 0.0 - 1.0 K/UL    ABS. EOSINOPHILS 0.1 0.0 - 0.4 K/UL    ABS. BASOPHILS 0.0 0.0 - 0.1 K/UL    ABS. IMM. GRANS. 0.1 (H) 0.00 - 0.04 K/UL    DF AUTOMATED     C REACTIVE PROTEIN, QT    Collection Time: 10/30/22  9:26 AM   Result Value Ref Range    C-Reactive protein 18.00 (H) 0.00 - 0.60 mg/dL   SED RATE (ESR)    Collection Time: 10/30/22  9:26 AM   Result Value Ref Range    Sed rate, automated 128 (H) 0 - 20 mm/hr   TSH 3RD GENERATION    Collection Time: 10/30/22  9:26 AM   Result Value Ref Range    TSH 1.91 0.36 - 3.74 uIU/mL   PHOSPHORUS    Collection Time: 10/30/22  9:26 AM   Result Value Ref Range    Phosphorus 2.2 (L) 2.6 - 4.7 mg/dL   PROCALCITONIN    Collection Time: 10/30/22  9:26 AM   Result Value Ref Range    Procalcitonin 0.44 (H) 0 ng/mL   GLUCOSE, POC    Collection Time: 10/30/22 10:38 AM   Result Value Ref Range    Glucose (POC) 194 (H) 65 - 100 mg/dL    Performed by Kenneth Lott    GLUCOSE, POC    Collection Time: 10/30/22  3:48 PM   Result Value Ref Range    Glucose (POC) 246 (H) 65 - 100 mg/dL    Performed by Steve Lima        CT CHEST WO CONT    (Results Pending)             _____________________________________________________________________________  Time spent in direct care including coordination of service, review of data and examination: > 35 minutes    ______________________________________________________________________________    GLENDY Weiner    This is dictation was done by dragon, computer voice recognition software.   Quite often unanticipated grammatical, syntax, homophones and other interpretive errors or inadvertently transcribed by the computer software. Please excuse errors that have escaped final proofreading. Thank you.

## 2022-10-30 NOTE — PROGRESS NOTES
Reason for Admission:  Pneumonia                     RUR Score: 12%                    Plan for utilizing home health:  None        PCP: First and Last name:  None     Name of Practice:    Are you a current patient: Yes/No:    Approximate date of last visit:    Can you participate in a virtual visit with your PCP:                     Current Advanced Directive/Advance Care Plan: Full Code      Healthcare Decision Maker:   Click here to complete [address] including selection of the [address] Relationship (ie \"Primary\")             Primary Decision MakerLorn Eddie Wright [facility] - [phone]                  Transition of Care Plan:     CM discussed discharge planning with patient at bedside. Patient will discharge home self care. Wife will assist as needed. Patient is independent with ADL/IADLcare. Patient self medicate and drives. No DME. Pharmacy: [facility] [phone]. Wife will transport at discharge. Medicare pt has received, reviewed, and signed 1st IM letter informing them of their right to appeal the discharge. Signed copied has been placed on pt bedside chart.

## 2022-10-30 NOTE — CONSULTS
PULMONARY CONSULT  [facility]    Name: Saba López MRN: [id number]   : 1960 Hospital: [facility]   Date: 10/30/2022  Admission date: 10/29/2022 Hospital Day: 2       HPI:     Hospital Problems  Date Reviewed: 10/29/2022            Codes Class Noted POA    Pneumonia ICD-10-CM: J18.9  ICD-9-CM: 281  10/29/2022 Unknown        Sepsis ([address] ICD-10-CM: A41.9  ICD-9-CM: 038.9, 995.91  10/24/2022 Yes        Acute respiratory failure with hypoxia [facility]) ICD-10-CM: J96.01  ICD-9-CM: 518.81  10/24/2022 Yes        Hypokalemia ICD-10-CM: E87.6  ICD-9-CM: 276.8  10/24/2022 Yes        Cavitary pneumonia ICD-10-CM: J18.9, J98.4  ICD-9-CM: 652, 518.89  10/24/2022 Yes                [x] High complexity decision making was performed  [x] See my orders for details      Subjective/Initial History:     I was asked by John Newton MD to see [facility]  a 58 y.o.    male in consultation     Excerpts from admission 10/29/2022 or consult notes as follows:   80-year-old male transferred from [facility] he was complaining of a generalized weakness shortness of breath dyspnea cough he is on room air now he has COVID 19 3 weeks ago for the past 1 week his condition got worse he had a CAT scan of the chest done which shows pulmonary lung nodules pneumonia infection he was treated with antibiotics and he was transferred to [facility] he is lying in the bed alert awake he is a current smoker history of diabetes hypertension, so pulmonary consult was called      No Known Allergies     MAR reviewed and pertinent medications noted or modified as needed     Current Facility-Administered Medications   Medication    vancomycin (VANCOCIN) 1,250 mg in 0.9% sodium chloride 250 mL ([id number])    [START ON 10/31/2022] VANCOMYCIN TROUGH LEVEL DUE AT 0800 ON 10/31    enoxaparin (LOVENOX) injection 30 mg    piperacillin-tazobactam (ZOSYN) 3.375 g in 0.9% sodium chloride (MBP/ADV) 100 mL MBP    acetylcysteine (MUCOMYST) 200 mg/mL (20 %) solution 200 mg    albuterol CONCENTRATE 2.5mg/0.5 mL neb soln    VANCOMYCIN INFORMATION NOTE 1 Each    insulin lispro (HUMALOG) injection    glucose chewable tablet 16 g    glucagon (GLUCAGEN) injection 1 mg    dextrose 10% infusion 0-250 mL    tamsulosin (FLOMAX) capsule 0.4 mg    lisinopriL (PRINIVIL, ZESTRIL) tablet 10 mg    guaiFENesin ER (MUCINEX) tablet 600 mg    carvediloL (COREG) tablet 6.25 mg    amLODIPine (NORVASC) tablet 10 mg    sodium chloride (NS) flush 5-40 mL    sodium chloride (NS) flush 5-40 mL    acetaminophen (TYLENOL) tablet 650 mg    budesonide-formoteroL (SYMBICORT) 160-4.5 mcg/actuation HFA inhaler 2 Puff      Patient PCP: None  PMH:  has a past medical history of Diabetes ([address] and Hypertension. PSH:   has no past surgical history on file. FHX: family history includes Breast Cancer in his father. SHX:  reports that he has been smoking. He has been smoking an average of .25 packs per day. He has never used smokeless tobacco. He reports current alcohol use. He reports current drug use. Drug: Marijuana. ROS:    Review of Systems   Constitutional:  Positive for malaise/fatigue. HENT: Negative. Eyes: Negative. Respiratory:  Positive for shortness of breath. Cardiovascular:  Positive for orthopnea. Gastrointestinal: Negative. Genitourinary: Negative. Musculoskeletal: Negative. Skin: Negative. Neurological: Negative. Psychiatric/Behavioral: Negative.         Objective:     Vital Signs: Telemetry:    normal sinus rhythm Intake/Output:   Visit Vitals  BP (!) 187/91 (BP 1 Location: Left upper arm, BP Patient Position: At rest)   Pulse (!) 102   Temp 97.9 °F (36.6 °C)   Resp 19   Ht 5' 7\" (1.702 m)   Wt 103 kg (227 lb)   SpO2 92%   BMI 35.55 kg/m²       Temp (24hrs), Av.1 °F (36.7 °C), Min:97.3 °F (36.3 °C), Max:98.5 °F (36.9 °C)        O2 Device: None (Room air)         Wt Readings from Last 4 Encounters:   10/29/22 103 kg (227 lb)   10/26/22 97.8 kg (215 lb 8 oz)   11/30/21 105.7 kg (233 lb)   07/09/21 105.7 kg (233 lb)        No intake or output data in the 24 hours ending 10/30/22 0910    Last shift:      No intake/output data recorded. Last 3 shifts: No intake/output data recorded. Physical Exam:     Physical Exam  Constitutional:       Appearance: Normal appearance. HENT:      Head: Normocephalic and atraumatic. Nose: Nose normal.      Mouth/Throat:      Mouth: Mucous membranes are moist.   Eyes:      Pupils: Pupils are equal, round, and reactive to light. Cardiovascular:      Rate and Rhythm: Normal rate and regular rhythm. Pulses: Normal pulses. Heart sounds: Normal heart sounds. Pulmonary:      Effort: Pulmonary effort is normal.      Breath sounds: Normal breath sounds. Abdominal:      General: Abdomen is flat. Bowel sounds are normal.      Palpations: Abdomen is soft. Musculoskeletal:         General: Normal range of motion. Cervical back: Normal range of motion and neck supple. Skin:     General: Skin is warm. Neurological:      General: No focal deficit present. Mental Status: He is alert. Psychiatric:         Mood and Affect: Mood normal.        Labs:    Recent Labs     10/29/22  0420 10/28/22  0400   WBC 26.5* 26.8*   HGB 9.4* 8.7*    279     Recent Labs     10/29/22  1315 10/29/22  0420 10/28/22  0400   NA  --  137 139   K  --  3.3* 3.1*   CL  --  102 103   CO2  --  26 28   GLU  --  145* 149*   BUN  --  8 7   CREA  --  0.67 0.66   CA  --  9.1 8.7   MG  --  2.1 2.1   LAC 1.3  --   --      No results for input(s): PH, PCO2, PO2, HCO3, FIO2 in the last 72 hours. No results for input(s): CPK, CKNDX, TROIQ in the last 72 hours.     No lab exists for component: CPKMB  No results found for: BNPP, BNP   Lab Results   Component Value Date/Time    Culture result: No growth 5 days 10/24/2022 02:20 PM    Culture result: No growth 5 days 10/24/2022 02:13 PM   No results found for: TSH, TSHEXT    Imaging:    CXR Results  (Last 48 hours)      None          Results from Hospital Encounter encounter on 10/24/22    XR CHEST PORT    Narrative  HISTORY:  -Provided with order: Cough  -Additional: None    Technique : AP PORTABLE CHEST    Comparison : 10/06/14    FINDINGS:    HEART AND MEDIASTINUM: Borderline cardiopericardial silhouette. LUNGS AND PLEURAL SPACES: Ill-defined opacities in the right more than left mid  and lower lung zones, somewhat nodular in appearance. No pleural effusion or  pneumothorax. BONY THORAX AND SOFT TISSUES: Spinal stimulator wires again project over the  midline lower thorax. Impression  Somewhat nodular bilateral parenchymal infiltrates are present, which can  reflect infectious/inflammatory etiologies. If there is a history of malignancy,  metastatic disease is not excluded. Results from [place] encounter on 10/24/22    CTA CHEST W OR W WO CONT    Narrative  EXAM: CTA Chest    CLINICAL INDICATION/HISTORY: Shortness of breath, cough and fever. Possible PE.    COMPARISON: Plain films same day, prior CTA chest 8/28/2008    TECHNIQUE: Axial CT imaging from the thoracic inlet through the diaphragm with  intravenous contrast utilizing CTA study for pulmonary artery evaluation. Coronal and sagittal MIP reformations were generated at a separate workstation. One or more dose reduction techniques were used on this CT: automated exposure  control, adjustment of the mAs and/or kVp according to patient's size, and  iterative reconstruction techniques. The specific techniques utilized on this CT  exam have been documented in the patient's electronic medical record.   Digital  imaging and communications and medicine (DICOM) format image data are available  to nonaffiliated external healthcare facilities or entities on a secure, media  free, reciprocally searchable basis with patient authorization for at least a 12  month period after this study. _______________    FINDINGS:    EXAM QUALITY: Overall exam quality is adequate. Pulmonary arterial enhancement  is adequate. The breath hold is satisfactory. PULMONARY ARTERIES: No convincing evidence of pulmonary embolism. MEDIASTINUM: Normal heart size. No evidence of right heart strain. Aorta is  unremarkable. No pericardial effusion. LYMPH NODES: Interval development of multiple mildly enlarged mediastinal and  bilateral hilar lymph nodes including subcarinal lymph node. AIRWAY: Unremarkable. LUNGS: Interval development of numerous bilateral small lung parenchymal  nodules, some of which are smoothly marginated but some of which have irregular  margins. The largest of these areas of masslike nodular density is within the  anterior right lung apex which has some associated internal air bronchogram but  measures 2.8 x 1.8 x 1.5 cm in greatest cephalocaudad, AP, and transverse  dimensions. There is also small internally cavitated nodule left midlung. PLEURA: No pleural effusion or pneumothorax. UPPER ABDOMEN: Visualized upper abdomen is unremarkable. .    OTHER: No acute or aggressive osseous abnormalities identified. Dorsal cord  stimulator in place.    _______________    Impression  1. No evidence of pulmonary embolism. 2.  Interval development of numerous bilateral lung parenchymal nodules, with  largest nodule right lung apex maximally 2.8 cm with air bronchograms. Additional several nodules have irregular margins rather than typical smoothly  marginated nodules as seen in metastatic disease. Small cavitated nodule left  lung. Differential diagnosis includes metastatic disease as well as  infectious/inflammatory etiology including septic emboli and can be correlated  clinically. 3. Interval development of mediastinal and bilateral hilar adenopathy, which may  either be neoplastic or inflammatory/infectious in etiology as above.         IMPRESSION:   Bilateral pneumonia  Chronic Obstructive Pulmonary Disease   Diabetes mellitus and hypertension by history  Pt is requiring Drug therapy requiring intensive monitoring for toxicity  Pt is unstable, unpredictable needing inpatient monitoring; is acutely ill and at high risk of sudden decline and decompensation with severe consequenses and continued end organ dysfunction and failure  Prognosis guarded       RECOMMENDATIONS/PLAN:     30-year-old male transferred from another facility because of abnormal CAT scan which shows bilateral lung parenchymal nodules right apical which is 2.8 cm but has air bronchogram and it in small cavitated nodule left lung  Patient started on Zosyn and vancomycin will repeat CAT scan also which shows mediastinal bilateral hilar adenopathy which can be reactive or can be underlying cancer or sarcoidosis if no improvement will consider doing bronchoscopy  Continue COPD management  He needs sleep study as an outpatient  Supplemental O2 to keep sats > 93%  Aspiration precautions  Labs to follow electrolytes, renal function and and blood counts  Glucose monitoring and SSI  Bronchial hygiene with respiratory therapy techniques, bronchodilators  DVT, SUP prophylaxis  Smoking cessation counseling done  Pt needs IV fluids with additives and Drug therapy requiring intensive monitoring for toxicity  Prescription drug management with home med reconciliation reviewed       This care involved high complexity medical decision making: I personally:  Reviewed the flowsheet and previous days notes  Reviewed and summarized records or history from previous days note or discussions with staff, family  High Risk Drug therapy requiring intensive monitoring for toxicity: eg steroids, pressors, antibiotics  Reviewed and/or ordered Clinical lab tests  Reviewed images and/or ordered Radiology tests  Reviewed the patients ECG / Telemetry  Reviewed and/or adjusted NiPPV settings  Called and arranged for Radiologic procedures or interventions  performed or ordered Diagnostic endoscopies with identified risk factors.   discussed my assessment/management with : Nursing, Hospitalist and Family for coordination of care          Joe Salas MD

## 2022-10-30 NOTE — CONSULTS
Consult Date: 10/30/2022    Consults Currently pneumonia    Subjective   This is a 58year old male, diabetic, seen in the ED at [facility] 5 days PTA with 2 week history of cough and subsequent fever, reportedly testing positive for Covid-19 about a week prior to onset of cough, but since tested negative. He had low grade temperature but his WBC was 28,400. CXR at that time showed nodular bilateral parenchymal infiltrates. CT Chest also showed bilateral lung nodules, largest in right apex with air bronchograms. In addition, he had mediastinal and bilateral hilar adenopathy. Sputum and AFB cultures were sent. Blood cultures were sent and patient was started on Ceftriaxone and Doxycycline. He was subsequently transferred to [address] [place] [address] for further workup. Here he remained afebrile with WBC 26,500. He is currently on  Vancomycin and Zosyn along with Solumedrol. ID has been consulted for this reason. Pulmonary has also been consulted. Patient affirmed history above. States that he is producing large amount of sputum and provided two specimens that were milky in appearance. Past Medical History:   Diagnosis Date    Diabetes ([address]     Hypertension       History reviewed. No pertinent surgical history.   Family History   Problem Relation Age of Onset    Breast Cancer Father       Social History     Tobacco Use    Smoking status: Every Day     Packs/day: 0.25     Types: Cigarettes    Smokeless tobacco: Never   Substance Use Topics    Alcohol use: Yes     Comment: OCCASIONALLY       Current Facility-Administered Medications   Medication Dose Route Frequency Provider Last Rate Last Admin    vancomycin (VANCOCIN) 1,250 mg in 0.9% sodium chloride 250 mL ([id number])  1,250 mg IntraVENous Q8H Leela Rivas MD   1,250 mg at 10/30/22 0224    [START ON 10/31/2022] VANCOMYCIN TROUGH LEVEL DUE AT 0800 ON 10/31   Other ONCE Leela Rivas MD        enoxaparin (LOVENOX) injection 30 mg  30 mg SubCUTAneous Q12H Kev Lovell MD   30 mg at 10/30/22 0544    piperacillin-tazobactam (ZOSYN) 3.375 g in 0.9% sodium chloride (MBP/ADV) 100 mL MBP  3.375 g IntraVENous Q8H Kev Lovell MD 25 mL/hr at 10/30/22 0801 3.375 g at 10/30/22 0801    acetylcysteine (MUCOMYST) 200 mg/mL (20 %) solution 200 mg  200 mg Nebulization Q6HWA RT Caroline Lacy MD        albuterol CONCENTRATE 2.5mg/0.5 mL neb soln  2.5 mg Nebulization Q6HWA RT Vamshi Kemp MD        methylPREDNISolone (PF) (SOLU-MEDROL) injection 40 mg  40 mg IntraVENous Q8H Vamshi Kemp MD        VANCOMYCIN INFORMATION NOTE 1 Each  1 Each Other Rx Dosing/Monitoring Kev Lovell MD        insulin lispro (HUMALOG) injection   SubCUTAneous AC&HS Kev Lovell MD   3 Units at 10/30/22 0759    glucose chewable tablet 16 g  4 Tablet Oral PRN Kev Lovell MD        glucagon (GLUCAGEN) injection 1 mg  1 mg IntraMUSCular PRN Kev Lovell MD        dextrose 10% infusion 0-250 mL  0-250 mL IntraVENous PRN Kev Lovell MD        tamsulosin (FLOMAX) capsule 0.4 mg  0.4 mg Oral QPM Kev Lovell MD        lisinopriL (PRINIVIL, ZESTRIL) tablet 10 mg  10 mg Oral DAILY Kev Lovell MD   10 mg at 10/30/22 0800    guaiFENesin ER (MUCINEX) tablet 600 mg  600 mg Oral Q12H Kev Lovell MD   600 mg at 10/30/22 0800    carvediloL (COREG) tablet 6.25 mg  6.25 mg Oral BID WITH MEALS Kev Lovell MD   6.25 mg at 10/30/22 0800    amLODIPine (NORVASC) tablet 10 mg  10 mg Oral DAILY Kev Lovell MD   10 mg at 10/30/22 0800    sodium chloride (NS) flush 5-40 mL  5-40 mL IntraVENous Q8H Kev Lovell MD   10 mL at 10/30/22 0043    sodium chloride (NS) flush 5-40 mL  5-40 mL IntraVENous PRN Kev Lovell MD        acetaminophen (TYLENOL) tablet 650 mg  650 mg Oral Q4H PRN Kev Lovell MD        budesonide-formoteroL (SYMBICORT) 160-4.5 mcg/actuation [facility] inhaler 2 Puff  2 Puff Inhalation BID RT June Kidd MD   2 Puff at 10/30/22 0750        Review of Systems   Constitutional:  Positive for chills and fever. HENT: Negative. Eyes: Negative. Respiratory:  Positive for cough and shortness of breath. Gastrointestinal: Negative. Endocrine: Negative. Genitourinary: Negative. Musculoskeletal: Negative. Skin: Negative. Allergic/Immunologic: Negative. Neurological: Negative. Hematological: Negative. Psychiatric/Behavioral: Negative. Objective     Vital signs for last 24 hours:  Visit Vitals  BP (!) 187/91 (BP 1 Location: Left upper arm, BP Patient Position: At rest)   Pulse (!) 102   Temp 97.9 °F (36.6 °C)   Resp 19   Ht 5' 7\" (1.702 m)   Wt 227 lb (103 kg)   SpO2 92%   BMI 35.55 kg/m²       Intake/Output this shift:  Current Shift: No intake/output data recorded. Last 3 Shifts: No intake/output data recorded. Data Review:   Recent Results (from the past 24 hour(s))   GLUCOSE, POC    Collection Time: 10/29/22 11:35 AM   Result Value Ref Range    Glucose (POC) 180 (H) 70 - 110 mg/dL    Performed by Stella Nowak, TROUGH    Collection Time: 10/29/22 12:25 PM   Result Value Ref Range    Vancomycin,trough 10.1 10.0 - 20.0 ug/mL    Reported dose date Blood      Reported dose: Blood Units   LACTIC ACID    Collection Time: 10/29/22  1:15 PM   Result Value Ref Range    Lactic acid 1.3 0.4 - 2.0 mmol/L   NT-PRO BNP    Collection Time: 10/29/22  1:15 PM   Result Value Ref Range    NT pro- (H) 0 - 900 pg/mL   GLUCOSE, POC    Collection Time: 10/29/22  4:48 PM   Result Value Ref Range    Glucose (POC) 229 (H) 70 - 110 mg/dL    Performed by [address], POC    Collection Time: 10/30/22  7:40 AM   Result Value Ref Range    Glucose (POC) 176 (H) 65 - 100 mg/dL    Performed by Destinee Baptiste      CT Chest (10/24)          Physical Exam  Vitals and nursing note reviewed.  Exam conducted with a chaperone present. Constitutional:       Appearance: He is ill-appearing. HENT:      Head: Normocephalic and atraumatic. Right Ear: External ear normal.      Left Ear: External ear normal.      Nose: Nose normal.      Mouth/Throat:      Pharynx: Oropharynx is clear. Cardiovascular:      Rate and Rhythm: Normal rate and regular rhythm. Heart sounds: No murmur heard. Pulmonary:      Effort: No respiratory distress. Breath sounds: Rhonchi present. No wheezing or rales. Abdominal:      General: Bowel sounds are normal.      Palpations: Abdomen is soft. Genitourinary:     Comments: No Hurtado catheter  Musculoskeletal:      Cervical back: Neck supple. Right lower leg: No edema. Left lower leg: No edema. Skin:     Findings: No rash. Neurological:      General: No focal deficit present. Mental Status: He is alert and oriented to person, place, and time. Psychiatric:         Mood and Affect: Mood normal.         Behavior: Behavior normal.         Thought Content: Thought content normal.         Judgment: Judgment normal.       ASSESSMENT/PLAN    Multinodular pneumonia with cavitation, etiology unclear  Mediastinal and hilar adenopathy  Sepsis with leukocytosis, elevated procal, CRP and ESR  Recent Covid-19 infection  5. Uncontrolled diabetes mellitus    Comment:  Acute  inflammatory markers point to bacterial infection best explained by seeding from the blood stream, such as MRSA/MSSA, especially some evidence of cavitation. Mediastinal and hilar adenopathy on the other hand suggest fungal infection, especially histoplasmosis with mediastinal adenopathy. This would be atypical for TB. Also would not expect metastatic cancer, which can cavitate, to cause leukcoytosis/CRP/procal.  Aspergillus infection described in Covid-19 patients, but it has been those heavily immunosuppressed by steroids/Actemra.     Continue IV Vancomycin and Zosyn  Follow-up blood and sputum culture for bacterial and fungal pathogens;  [facility]B culture sent from [facility]  In am, repeat CBC, CRP, procal; check  serum fungitell, Aspergillus galactomannan antigen, ACE level    Phuc Mcnair Kidney, MD

## 2022-10-30 NOTE — H&P
Hospitalist History & Physical Notes. [facility]. Name : Katherine Bailey      MRN number : [id number]     YOB: 1960     Subjective :   Chief Complaint : Cavitary pneumonia with sepsis, transferred from referring facility for further work-up with pulmonary and infectious diseases    Source of information : Patient, records from the referring facility    History of present illness:   58 y.o. male with history of diabetes and hypertension presents to the referring facility the emergency room complaining of cough for more than 2 weeks that was gradually worsening with shortness of breath started 1 week ago along with fever. It started getting worse, has no appetite. Complains of significant fatigue and weakness. Had fever and chills. Patient also had COVID-19 3 weeks prior to the presentation at the referring facility. On evaluation in the emergency room he is found with nodular bilateral parenchymal infiltrates, WBC of 28.4, CT of the chest done showed worsening of parenchymal nodules with air bronchograms. He is admitted started on antibiotics and IV fluids, as he needs further work-up transferred here. He had a previous CT in the past with nodules with the previous CT with no nodules. At this time they are worse. Patient states initially when he had cough unable to bring the sputum and more short of breath now he started feeling better coughing up a lot of sputum. Appetite is started getting better and wants to eat and overall general condition he feels better. He appears in good spirits. Past Medical History:   Diagnosis Date    Diabetes ([address]     Hypertension      History reviewed. No pertinent surgical history.     Family History   Problem Relation Age of Onset    Breast Cancer Father       Social History     Tobacco Use    Smoking status: Every Day     Packs/day: 0.25     Types: Cigarettes    Smokeless tobacco: Never   Substance Use Topics Alcohol use: Yes     Comment: OCCASIONALLY       Prior to Admission medications    Medication Sig Start Date End Date Taking? Authorizing Provider   amLODIPine (NORVASC) 10 mg tablet Take 1 Tablet by mouth daily. 11/30/21  Yes Nini Swain MD     No Known Allergies          Review of Systems:  Constitutional: Started feeling better with his appetite. Denies any fever or chills at this time. Eye: No recent visual disturbances, no discharge, no double vision. Ear/nose/mouth/throat : No hearing disturbance, no ear pain, ++ nasal congestion, no sore throat, no trouble swallowing. Respiratory : Had back trouble breathing and shortness of breath which is improving, denies any hemoptysis. Cardiovascular : No chest pain, no palpitation,no orthopnea, no peripheral edema. Gastrointestinal : No nausea, no vomiting, no diarrhea, No abdominal pain. Genitourinary : No dysuria, no hematuria, . Endocrine : No excessive thirst, No polyuria   Immunologic : , No seasonal allergies. Musculoskeletal : No joint swelling, No pain, No effusion,    Integumentary : No rash, No pruritus,   Hematology : No petechiae, No easy bruising,  No tendency to bleed easy. Neurology : Denies change in mental status, + headache, No confusion, No numbness or tingling. Psychiatric : No mood swings, No anxiety, No depression. Vitals:   Patient Vitals for the past 12 hrs:   Temp Pulse Resp BP SpO2   10/29/22 2221 98.5 °F (36.9 °C) (!) 105 20 (!) 175/77 94 %       Physical Exam:   General : Looks tired but comfortable, no acute respiratory distress noted. HEENT : PERRLA, normal oral mucosa, atraumatic normocephalic, Normal ear and nose. Neck : Supple, no JVD, no masses noted, no carotid bruit. Lungs : Breath sounds with moderate air entry bilaterally, rhonchi at bases, occasional expiratory wheezes, prolonged expirations. CVS : Rhythm rate regular, S1+, S2+, no murmur or gallop. Abdomen : Soft, nontender, obese.   Bowel sounds active. Extremities : No edema noted,  pedal pulses palpable. Musculoskeletal : Fair range of motion, no joint swelling or effusion, muscle tone and power appears good. Skin : Moist, warm,  no pathological rash. Lymphatic : No cervical lymphadenopathy. Neurological : Awake, alert, oriented to time place person. Psychiatric : Mood and affect appears appropriate to the situation. Data Review:   Recent Results (from the past 24 hour(s))   MAGNESIUM    Collection Time: 10/29/22  4:20 AM   Result Value Ref Range    Magnesium 2.1 1.6 - 2.6 mg/dL   CBC WITH AUTOMATED DIFF    Collection Time: 10/29/22  4:20 AM   Result Value Ref Range    WBC 26.5 (H) 4.6 - 13.2 K/uL    RBC 3.57 (L) 4.35 - 5.65 M/uL    HGB 9.4 (L) 13.0 - 16.0 g/dL    HCT 30.4 (L) 36.0 - 48.0 %    MCV 85.2 78.0 - 100.0 FL    MCH 26.3 24.0 - 34.0 PG    MCHC 30.9 (L) 31.0 - 37.0 g/dL    RDW 15.4 (H) 11.6 - 14.5 %    PLATELET 289 665 - 188 K/uL    MPV 10.2 9.2 - 11.8 FL    NRBC 0.0 0.0  WBC    ABSOLUTE NRBC 0.00 0.00 - 0.01 K/uL    NEUTROPHILS 81 (H) 40 - 73 %    LYMPHOCYTES 15 (L) 21 - 52 %    MONOCYTES 2 (L) 3 - 10 %    EOSINOPHILS 0 0 - 5 %    BASOPHILS 0 0 - 2 %    OTHER CELL 2 %    IMMATURE GRANULOCYTES 0 %    ABS. NEUTROPHILS 21.5 (H) 1.8 - 8.0 K/UL    ABS. LYMPHOCYTES 4.0 (H) 0.9 - 3.6 K/UL    ABS. MONOCYTES 0.5 0.05 - 1.2 K/UL    ABS. EOSINOPHILS 0.0 0.0 - 0.4 K/UL    ABS. BASOPHILS 0.0 0.0 - 0.1 K/UL    ABS. IMM.  GRANS. 0.0 K/UL    DF Manual      RBC COMMENTS Normocytic, Normochromic     METABOLIC PANEL, BASIC    Collection Time: 10/29/22  4:20 AM   Result Value Ref Range    Sodium 137 136 - 145 mmol/L    Potassium 3.3 (L) 3.5 - 5.5 mmol/L    Chloride 102 100 - 111 mmol/L    CO2 26 21 - 32 mmol/L    Anion gap 9 3.0 - 18.0 mmol/L    Glucose 145 (H) 74 - 99 mg/dL    BUN 8 7 - 18 mg/dL    Creatinine 0.67 0.60 - 1.30 mg/dL    BUN/Creatinine ratio 12 12 - 20      eGFR >60 >60 ml/min/1.73m2    Calcium 9.1 8.5 - 10.1 mg/dL GLUCOSE, POC    Collection Time: 10/29/22  8:36 AM   Result Value Ref Range    Glucose (POC) 215 (H) 70 - 110 mg/dL    Performed by [address], POC    Collection Time: 10/29/22 11:35 AM   Result Value Ref Range    Glucose (POC) 180 (H) 70 - 110 mg/dL    Performed by Bo Patient, TROUGH    Collection Time: 10/29/22 12:25 PM   Result Value Ref Range    Vancomycin,trough 10.1 10.0 - 20.0 ug/mL    Reported dose date Blood      Reported dose: Blood Units   LACTIC ACID    Collection Time: 10/29/22  1:15 PM   Result Value Ref Range    Lactic acid 1.3 0.4 - 2.0 mmol/L   NT-PRO BNP    Collection Time: 10/29/22  1:15 PM   Result Value Ref Range    NT pro- (H) 0 - 900 pg/mL   GLUCOSE, POC    Collection Time: 10/29/22  4:48 PM   Result Value Ref Range    Glucose (POC) 229 (H) 70 - 110 mg/dL    Performed by Joshi Messenger        Radiologic Studies :   CT chest on 10/24/2022 at the referring facility report:      1. No evidence of pulmonary embolism. 2.  Interval development of numerous bilateral lung parenchymal nodules, with  largest nodule right lung apex maximally 2.8 cm with air bronchograms. Additional several nodules have irregular margins rather than typical smoothly  marginated nodules as seen in metastatic disease. Small cavitated nodule left  lung. Differential diagnosis includes metastatic disease as well as  infectious/inflammatory etiology including septic emboli and can be correlated  clinically. 3. Interval development of mediastinal and bilateral hilar adenopathy, which may  either be neoplastic or inflammatory/infectious in etiology as above. Assessment and Plan :     Sepsis syndrome secondary to cavitary pneumonia, improving, even though still has elevated WBC count clinically he is looking much better. Has no temperature. Acute respiratory failure with hypoxia: Secondary to cavitary pneumonia, saturating well on room air at this time.   Even during transportation per staff he was doing well on room air. Cavitary pneumonia bilaterally with multiple nodules: Looks infectious pathology, on antibiotics. We will consult infectious disease and pulmonary for further evaluation. Chronic obstructive pulmonary disease: On Symbicort and Proventil as needed    Diabetes mellitus type 2 uncontrolled with hyperglycemia: On sliding scale insulin at this time, once stable we will start on scheduled medications. Benign essential hypertension: On amlodipine, carvedilol and lisinopril which we will continue    Mild hypokalemia: On supplementation we will check in the morning and follow-up    Acute respiratory failure with hypoxia that is resolved on room air but stable. Admitted to medical telemetry, full CODE STATUS, medications from the referring facility reviewed. Enoxaparin for VTE prophylaxis. No advance medical directives, spouse is at bedside who is helping her making decisions. CC : None  Signed By: Bozena Darnell MD     October 29, 2022      This dictation was done by dragon, computer voice recognition software. Often unanticipated grammatical, syntax, [place] phones and other interpretive errors are inadvertently transcribed. Please excuse errors that have escaped final proofreading.

## 2022-10-30 NOTE — PROGRESS NOTES
Patient admission assessment completed for this shift. Bed in lowest position. Call bell within reach. Patient voiced  understanding to call for assistance as needed. c/o pain voiced by patient, at this time, right hand continues to hurt r/t IVF infiltrate. No falls reported at this time. Patient remains stable. Progressing in plan of care. Will continue to monitor during this admission.       Problem: General Medical Care Plan  Goal: *Vital signs within specified parameters  Outcome: Progressing Towards Goal  Goal: *Labs within defined limits  Outcome: Progressing Towards Goal  Goal: *Absence of infection signs and symptoms  Outcome: Progressing Towards Goal  Goal: *Optimal pain control at patient's stated goal  Outcome: Progressing Towards Goal  Goal: *Skin integrity maintained  Outcome: Progressing Towards Goal  Goal: *Fluid volume balance  Outcome: Progressing Towards Goal  Goal: *Optimize nutritional status  Outcome: Progressing Towards Goal  Goal: *Anxiety reduced or absent  Outcome: Progressing Towards Goal  Goal: *Progressive mobility and function (eg: ADL's)  Outcome: Progressing Towards Goal     Problem: Patient Education: Go to Patient Education Activity  Goal: Patient/Family Education  Outcome: Progressing Towards Goal

## 2022-10-30 NOTE — PROGRESS NOTES
Vancomycin Dosing Consult  Marcos Guaman is a 58 y.o. male with pneumonia (CAP) from [facility] on Vancomycin for 4 days. Pharmacy was consulted by Dr. Nicole Randall to dose and monitor Vancomycin. Today is day 1. Antibiotic regimen: Vancomycin + Zosyn    Temp (24hrs), Av °F (36.7 °C), Min:97.3 °F (36.3 °C), Max:98.5 °F (36.9 °C)    Recent Labs     10/29/22  0420 10/28/22  0400 10/27/22  0520   WBC 26.5* 26.8* 23.7*     Recent Labs     10/29/22  0420 10/28/22  0400 10/27/22  0520 10/26/22  0452 10/25/22  0515 10/24/22  1355   CREA 0.67 0.66 0.75 0.79 0.82 0.86   BUN 8 7 9 8 7 8       Recent Labs     10/24/22  1355   PCT 1.63       Estimated Creatinine Clearance: 125.2 mL/min (by C-G formula based on SCr of 0.67 mg/dL).  ml/min  Concomitant nephrotoxic drugs: None    Cultures:   10/28 sputum: NEG  10/24 blood: NGTD, final    MRSA Swab: Not ordered, patient already received first dose of vancomycin    Target range: AUC/LAURENT 400-600    Recent level history (3):  Date/Time Previous Dose & Interval Measured Level (mcg/mL) Associated AUC/LAURENT   10/29 1225 1000mg IV q8h 10.1 365     Ht Readings from Last 1 Encounters:   10/29/22 170.2 cm (67\")     Wt Readings from Last 1 Encounters:   10/29/22 103 kg (227 lb)     Ideal body weight: 66.1 kg (145 lb 11.6 oz)  Adjusted ideal body weight: 80.8 kg (178 lb 3.8 oz)        Assessment/Plan:   Afebrile , leukocytosis  Vancomycin AUC is still subtherapeutic  Increase Vancomycin 1250mg IV q8h  Pharmacy will order a trough level on 10/31 at 0800  Antimicrobial stop date TBD

## 2022-10-31 ENCOUNTER — APPOINTMENT (OUTPATIENT)
Dept: NON INVASIVE DIAGNOSTICS | Age: 62
DRG: 871 | End: 2022-10-31
Attending: HOSPITALIST
Payer: MEDICARE

## 2022-10-31 ENCOUNTER — APPOINTMENT (OUTPATIENT)
Dept: CT IMAGING | Age: 62
DRG: 871 | End: 2022-10-31
Attending: INTERNAL MEDICINE
Payer: MEDICARE

## 2022-10-31 LAB
25(OH)D3 SERPL-MCNC: 10.1 NG/ML (ref 30–100)
ALBUMIN SERPL-MCNC: 2.1 G/DL (ref 3.5–5)
ALBUMIN/GLOB SERPL: 0.4 {RATIO} (ref 1.1–2.2)
ALP SERPL-CCNC: 125 U/L (ref 45–117)
ALT SERPL-CCNC: 32 U/L (ref 12–78)
ANION GAP SERPL CALC-SCNC: 4 MMOL/L (ref 5–15)
AST SERPL W P-5'-P-CCNC: 24 U/L (ref 15–37)
BASOPHILS # BLD: 0 K/UL (ref 0–0.1)
BASOPHILS NFR BLD: 0 % (ref 0–1)
BILIRUB SERPL-MCNC: 0.2 MG/DL (ref 0.2–1)
BUN SERPL-MCNC: 11 MG/DL (ref 6–20)
BUN/CREAT SERPL: 11 (ref 12–20)
CA-I BLD-MCNC: 9.4 MG/DL (ref 8.5–10.1)
CHLORIDE SERPL-SCNC: 104 MMOL/L (ref 97–108)
CO2 SERPL-SCNC: 29 MMOL/L (ref 21–32)
CREAT SERPL-MCNC: 0.93 MG/DL (ref 0.7–1.3)
CREAT SERPL-MCNC: 0.99 MG/DL (ref 0.7–1.3)
CRP SERPL-MCNC: 13 MG/DL (ref 0–0.6)
DIFFERENTIAL METHOD BLD: ABNORMAL
ECHO AO ROOT DIAM: 3.1 CM
ECHO AO ROOT INDEX: 1.46 CM/M2
ECHO AV AREA PEAK VELOCITY: 1.9 CM2
ECHO AV AREA VTI: 1.8 CM2
ECHO AV AREA/BSA PEAK VELOCITY: 0.9 CM2/M2
ECHO AV AREA/BSA VTI: 0.8 CM2/M2
ECHO AV MEAN GRADIENT: 8 MMHG
ECHO AV MEAN VELOCITY: 1.3 M/S
ECHO AV PEAK GRADIENT: 19 MMHG
ECHO AV PEAK VELOCITY: 2.2 M/S
ECHO AV VELOCITY RATIO: 0.55
ECHO AV VTI: 38.5 CM
ECHO EST RA PRESSURE: 3 MMHG
ECHO LA AREA 2C: 12.9 CM2
ECHO LA AREA 4C: 25.3 CM2
ECHO LA DIAMETER INDEX: 1.88 CM/M2
ECHO LA DIAMETER: 4 CM
ECHO LA MAJOR AXIS: 6.1 CM
ECHO LA MINOR AXIS: 4.9 CM
ECHO LA TO AORTIC ROOT RATIO: 1.29
ECHO LA VOL 4C: 86 ML (ref 18–58)
ECHO LA VOL BP: 54 ML (ref 18–58)
ECHO LA VOL/BSA BIPLANE: 25 ML/M2 (ref 16–34)
ECHO LA VOLUME INDEX A4C: 40 ML/M2 (ref 16–34)
ECHO LV E' LATERAL VELOCITY: 10 CM/S
ECHO LV E' SEPTAL VELOCITY: 10 CM/S
ECHO LV EDV A2C: 65 ML
ECHO LV EDV A4C: 105 ML
ECHO LV EDV INDEX A4C: 49 ML/M2
ECHO LV EDV NDEX A2C: 31 ML/M2
ECHO LV EJECTION FRACTION A2C: 52 %
ECHO LV EJECTION FRACTION A4C: 53 %
ECHO LV EJECTION FRACTION BIPLANE: 53 % (ref 55–100)
ECHO LV ESV A2C: 31 ML
ECHO LV ESV A4C: 49 ML
ECHO LV ESV INDEX A2C: 15 ML/M2
ECHO LV ESV INDEX A4C: 23 ML/M2
ECHO LV FRACTIONAL SHORTENING: 38 % (ref 28–44)
ECHO LV INTERNAL DIMENSION DIASTOLE INDEX: 2.25 CM/M2
ECHO LV INTERNAL DIMENSION DIASTOLIC: 4.8 CM (ref 4.2–5.9)
ECHO LV INTERNAL DIMENSION SYSTOLIC INDEX: 1.41 CM/M2
ECHO LV INTERNAL DIMENSION SYSTOLIC: 3 CM
ECHO LV IVSD: 1.4 CM (ref 0.6–1)
ECHO LV MASS 2D: 245.7 G (ref 88–224)
ECHO LV MASS INDEX 2D: 115.4 G/M2 (ref 49–115)
ECHO LV POSTERIOR WALL DIASTOLIC: 1.2 CM (ref 0.6–1)
ECHO LV RELATIVE WALL THICKNESS RATIO: 0.5
ECHO LVOT AREA: 3.5 CM2
ECHO LVOT AV VTI INDEX: 0.52
ECHO LVOT DIAM: 2.1 CM
ECHO LVOT MEAN GRADIENT: 3 MMHG
ECHO LVOT PEAK GRADIENT: 6 MMHG
ECHO LVOT PEAK VELOCITY: 1.2 M/S
ECHO LVOT STROKE VOLUME INDEX: 32.7 ML/M2
ECHO LVOT SV: 69.6 ML
ECHO LVOT VTI: 20.1 CM
ECHO MV A VELOCITY: 1.28 M/S
ECHO MV E VELOCITY: 1.43 M/S
ECHO MV E/A RATIO: 1.12
ECHO MV E/E' LATERAL: 14.3
ECHO MV E/E' RATIO (AVERAGED): 14.3
ECHO MV E/E' SEPTAL: 14.3
ECHO MV REGURGITANT PEAK GRADIENT: 92 MMHG
ECHO MV REGURGITANT PEAK VELOCITY: 4.8 M/S
ECHO PV MAX VELOCITY: 1.1 M/S
ECHO PV PEAK GRADIENT: 5 MMHG
ECHO RIGHT VENTRICULAR SYSTOLIC PRESSURE (RVSP): 40 MMHG
ECHO RV FREE WALL PEAK S': 14 CM/S
ECHO TV REGURGITANT MAX VELOCITY: 3.05 M/S
ECHO TV REGURGITANT PEAK GRADIENT: 37 MMHG
EOSINOPHIL # BLD: 0 K/UL (ref 0–0.4)
EOSINOPHIL NFR BLD: 0 % (ref 0–7)
ERYTHROCYTE [DISTWIDTH] IN BLOOD BY AUTOMATED COUNT: 15.2 % (ref 11.5–14.5)
EST. AVERAGE GLUCOSE BLD GHB EST-MCNC: 174 MG/DL
GLOBULIN SER CALC-MCNC: 5.2 G/DL (ref 2–4)
GLUCOSE BLD STRIP.AUTO-MCNC: 229 MG/DL (ref 65–100)
GLUCOSE BLD STRIP.AUTO-MCNC: 277 MG/DL (ref 65–100)
GLUCOSE BLD STRIP.AUTO-MCNC: 324 MG/DL (ref 65–100)
GLUCOSE BLD STRIP.AUTO-MCNC: 343 MG/DL (ref 65–100)
GLUCOSE BLD STRIP.AUTO-MCNC: 351 MG/DL (ref 65–100)
GLUCOSE SERPL-MCNC: 331 MG/DL (ref 65–100)
HBA1C MFR BLD: 7.7 % (ref 4–5.6)
HCT VFR BLD AUTO: 28.5 % (ref 36.6–50.3)
HGB BLD-MCNC: 9 G/DL (ref 12.1–17)
IMM GRANULOCYTES # BLD AUTO: 0.3 K/UL (ref 0–0.04)
IMM GRANULOCYTES NFR BLD AUTO: 2 % (ref 0–0.5)
LYMPHOCYTES # BLD: 1.3 K/UL (ref 0.8–3.5)
LYMPHOCYTES NFR BLD: 6 % (ref 12–49)
MCH RBC QN AUTO: 25.6 PG (ref 26–34)
MCHC RBC AUTO-ENTMCNC: 31.6 G/DL (ref 30–36.5)
MCV RBC AUTO: 81 FL (ref 80–99)
MONOCYTES # BLD: 0.7 K/UL (ref 0–1)
MONOCYTES NFR BLD: 3 % (ref 5–13)
NEUTS SEG # BLD: 19.3 K/UL (ref 1.8–8)
NEUTS SEG NFR BLD: 89 % (ref 32–75)
NRBC # BLD: 0 K/UL (ref 0–0.01)
NRBC BLD-RTO: 0 PER 100 WBC
PERFORMED BY, TECHID: ABNORMAL
PLATELET # BLD AUTO: 432 K/UL (ref 150–400)
PMV BLD AUTO: 9.9 FL (ref 8.9–12.9)
POTASSIUM SERPL-SCNC: 3.6 MMOL/L (ref 3.5–5.1)
PROCALCITONIN SERPL-MCNC: 0.33 NG/ML
PROT SERPL-MCNC: 7.3 G/DL (ref 6.4–8.2)
RBC # BLD AUTO: 3.52 M/UL (ref 4.1–5.7)
SODIUM SERPL-SCNC: 137 MMOL/L (ref 136–145)
VANCOMYCIN TROUGH SERPL-MCNC: 20.9 UG/ML (ref 5–10)
WBC # BLD AUTO: 21.6 K/UL (ref 4.1–11.1)

## 2022-10-31 PROCEDURE — P9047 ALBUMIN (HUMAN), 25%, 50ML: HCPCS | Performed by: INTERNAL MEDICINE

## 2022-10-31 PROCEDURE — 82164 ANGIOTENSIN I ENZYME TEST: CPT

## 2022-10-31 PROCEDURE — 74011250637 HC RX REV CODE- 250/637: Performed by: NURSE PRACTITIONER

## 2022-10-31 PROCEDURE — 94640 AIRWAY INHALATION TREATMENT: CPT

## 2022-10-31 PROCEDURE — 74011250637 HC RX REV CODE- 250/637: Performed by: HOSPITALIST

## 2022-10-31 PROCEDURE — 82962 GLUCOSE BLOOD TEST: CPT

## 2022-10-31 PROCEDURE — 80202 ASSAY OF VANCOMYCIN: CPT

## 2022-10-31 PROCEDURE — 80053 COMPREHEN METABOLIC PANEL: CPT

## 2022-10-31 PROCEDURE — 74011000250 HC RX REV CODE- 250: Performed by: HOSPITALIST

## 2022-10-31 PROCEDURE — 87449 NOS EACH ORGANISM AG IA: CPT

## 2022-10-31 PROCEDURE — 87305 ASPERGILLUS AG IA: CPT

## 2022-10-31 PROCEDURE — 74011250636 HC RX REV CODE- 250/636: Performed by: HOSPITALIST

## 2022-10-31 PROCEDURE — 94761 N-INVAS EAR/PLS OXIMETRY MLT: CPT

## 2022-10-31 PROCEDURE — 74011250636 HC RX REV CODE- 250/636: Performed by: INTERNAL MEDICINE

## 2022-10-31 PROCEDURE — 74011636637 HC RX REV CODE- 636/637: Performed by: NURSE PRACTITIONER

## 2022-10-31 PROCEDURE — 85025 COMPLETE CBC W/AUTO DIFF WBC: CPT

## 2022-10-31 PROCEDURE — 71250 CT THORAX DX C-: CPT

## 2022-10-31 PROCEDURE — 99232 SBSQ HOSP IP/OBS MODERATE 35: CPT | Performed by: INTERNAL MEDICINE

## 2022-10-31 PROCEDURE — 74011000258 HC RX REV CODE- 258: Performed by: HOSPITALIST

## 2022-10-31 PROCEDURE — 74011250636 HC RX REV CODE- 250/636: Performed by: LICENSED PRACTICAL NURSE

## 2022-10-31 PROCEDURE — 36415 COLL VENOUS BLD VENIPUNCTURE: CPT

## 2022-10-31 PROCEDURE — 74011636637 HC RX REV CODE- 636/637: Performed by: LICENSED PRACTICAL NURSE

## 2022-10-31 PROCEDURE — 86140 C-REACTIVE PROTEIN: CPT

## 2022-10-31 PROCEDURE — 84145 PROCALCITONIN (PCT): CPT

## 2022-10-31 PROCEDURE — 74011000250 HC RX REV CODE- 250: Performed by: INTERNAL MEDICINE

## 2022-10-31 PROCEDURE — 93306 TTE W/DOPPLER COMPLETE: CPT

## 2022-10-31 PROCEDURE — 65270000029 HC RM PRIVATE

## 2022-10-31 RX ORDER — INSULIN LISPRO 100 [IU]/ML
15 INJECTION, SOLUTION INTRAVENOUS; SUBCUTANEOUS ONCE
Status: COMPLETED | OUTPATIENT
Start: 2022-10-31 | End: 2022-10-31

## 2022-10-31 RX ORDER — POTASSIUM CHLORIDE 20 MEQ/1
40 TABLET, EXTENDED RELEASE ORAL 2 TIMES DAILY
Status: DISCONTINUED | OUTPATIENT
Start: 2022-10-31 | End: 2022-11-02 | Stop reason: HOSPADM

## 2022-10-31 RX ORDER — BENZONATATE 100 MG/1
200 CAPSULE ORAL
Status: DISCONTINUED | OUTPATIENT
Start: 2022-10-31 | End: 2022-11-02 | Stop reason: HOSPADM

## 2022-10-31 RX ORDER — LISINOPRIL 40 MG/1
40 TABLET ORAL DAILY
Status: DISCONTINUED | OUTPATIENT
Start: 2022-11-01 | End: 2022-11-02 | Stop reason: HOSPADM

## 2022-10-31 RX ORDER — METFORMIN HYDROCHLORIDE 500 MG/1
500 TABLET ORAL 2 TIMES DAILY WITH MEALS
Status: DISCONTINUED | OUTPATIENT
Start: 2022-10-31 | End: 2022-11-02 | Stop reason: HOSPADM

## 2022-10-31 RX ADMIN — ACETYLCYSTEINE 200 MG: 200 SOLUTION ORAL; RESPIRATORY (INHALATION) at 19:23

## 2022-10-31 RX ADMIN — POTASSIUM CHLORIDE 40 MEQ: 1500 TABLET, EXTENDED RELEASE ORAL at 21:10

## 2022-10-31 RX ADMIN — BUDESONIDE AND FORMOTEROL FUMARATE DIHYDRATE 2 PUFF: 160; 4.5 AEROSOL RESPIRATORY (INHALATION) at 19:23

## 2022-10-31 RX ADMIN — FUROSEMIDE 40 MG: 10 INJECTION, SOLUTION INTRAMUSCULAR; INTRAVENOUS at 21:11

## 2022-10-31 RX ADMIN — VANCOMYCIN HYDROCHLORIDE 1000 MG: 1 INJECTION, POWDER, LYOPHILIZED, FOR SOLUTION INTRAVENOUS at 11:11

## 2022-10-31 RX ADMIN — AMLODIPINE BESYLATE 10 MG: 5 TABLET ORAL at 09:14

## 2022-10-31 RX ADMIN — HYDRALAZINE HYDROCHLORIDE 20 MG: 20 INJECTION, SOLUTION INTRAMUSCULAR; INTRAVENOUS at 01:08

## 2022-10-31 RX ADMIN — ACETYLCYSTEINE 200 MG: 200 SOLUTION ORAL; RESPIRATORY (INHALATION) at 13:54

## 2022-10-31 RX ADMIN — ACETYLCYSTEINE 200 MG: 200 SOLUTION ORAL; RESPIRATORY (INHALATION) at 08:58

## 2022-10-31 RX ADMIN — METHYLPREDNISOLONE SODIUM SUCCINATE 40 MG: 40 INJECTION, POWDER, FOR SOLUTION INTRAMUSCULAR; INTRAVENOUS at 01:08

## 2022-10-31 RX ADMIN — TAMSULOSIN HYDROCHLORIDE 0.4 MG: 0.4 CAPSULE ORAL at 17:38

## 2022-10-31 RX ADMIN — PIPERACILLIN AND TAZOBACTAM 3.38 G: 3; .375 INJECTION, POWDER, FOR SOLUTION INTRAVENOUS at 13:33

## 2022-10-31 RX ADMIN — ALBUMIN (HUMAN) 12.5 G: 0.25 INJECTION, SOLUTION INTRAVENOUS at 03:30

## 2022-10-31 RX ADMIN — FUROSEMIDE 40 MG: 10 INJECTION, SOLUTION INTRAMUSCULAR; INTRAVENOUS at 09:14

## 2022-10-31 RX ADMIN — ENOXAPARIN SODIUM 30 MG: 100 INJECTION SUBCUTANEOUS at 17:37

## 2022-10-31 RX ADMIN — CARVEDILOL 6.25 MG: 3.12 TABLET, FILM COATED ORAL at 08:00

## 2022-10-31 RX ADMIN — SODIUM CHLORIDE, PRESERVATIVE FREE 10 ML: 5 INJECTION INTRAVENOUS at 21:11

## 2022-10-31 RX ADMIN — PIPERACILLIN AND TAZOBACTAM 3.38 G: 3; .375 INJECTION, POWDER, FOR SOLUTION INTRAVENOUS at 01:09

## 2022-10-31 RX ADMIN — ALBUTEROL SULFATE 2.5 MG: 2.5 SOLUTION RESPIRATORY (INHALATION) at 08:58

## 2022-10-31 RX ADMIN — INSULIN LISPRO 7 UNITS: 100 INJECTION, SOLUTION INTRAVENOUS; SUBCUTANEOUS at 17:38

## 2022-10-31 RX ADMIN — VANCOMYCIN HYDROCHLORIDE 1000 MG: 1 INJECTION, POWDER, LYOPHILIZED, FOR SOLUTION INTRAVENOUS at 19:26

## 2022-10-31 RX ADMIN — GUAIFENESIN 600 MG: 600 TABLET, EXTENDED RELEASE ORAL at 09:14

## 2022-10-31 RX ADMIN — ALBUTEROL SULFATE 2.5 MG: 2.5 SOLUTION RESPIRATORY (INHALATION) at 13:53

## 2022-10-31 RX ADMIN — INSULIN LISPRO 10 UNITS: 100 INJECTION, SOLUTION INTRAVENOUS; SUBCUTANEOUS at 08:00

## 2022-10-31 RX ADMIN — BUDESONIDE AND FORMOTEROL FUMARATE DIHYDRATE 2 PUFF: 160; 4.5 AEROSOL RESPIRATORY (INHALATION) at 08:58

## 2022-10-31 RX ADMIN — ALBUMIN (HUMAN) 12.5 G: 0.25 INJECTION, SOLUTION INTRAVENOUS at 17:48

## 2022-10-31 RX ADMIN — VANCOMYCIN HYDROCHLORIDE 1250 MG: 1.25 INJECTION, POWDER, LYOPHILIZED, FOR SOLUTION INTRAVENOUS at 01:25

## 2022-10-31 RX ADMIN — ALBUMIN (HUMAN) 12.5 G: 0.25 INJECTION, SOLUTION INTRAVENOUS at 08:00

## 2022-10-31 RX ADMIN — INSULIN LISPRO 15 UNITS: 100 INJECTION, SOLUTION INTRAVENOUS; SUBCUTANEOUS at 12:40

## 2022-10-31 RX ADMIN — LISINOPRIL 10 MG: 10 TABLET ORAL at 09:14

## 2022-10-31 RX ADMIN — SODIUM CHLORIDE, PRESERVATIVE FREE 10 ML: 5 INJECTION INTRAVENOUS at 05:28

## 2022-10-31 RX ADMIN — ENOXAPARIN SODIUM 30 MG: 100 INJECTION SUBCUTANEOUS at 05:08

## 2022-10-31 RX ADMIN — SODIUM CHLORIDE, PRESERVATIVE FREE 10 ML: 5 INJECTION INTRAVENOUS at 15:09

## 2022-10-31 RX ADMIN — METFORMIN HYDROCHLORIDE 500 MG: 500 TABLET ORAL at 17:38

## 2022-10-31 RX ADMIN — PIPERACILLIN AND TAZOBACTAM 3.38 G: 3; .375 INJECTION, POWDER, FOR SOLUTION INTRAVENOUS at 21:11

## 2022-10-31 RX ADMIN — GUAIFENESIN 600 MG: 600 TABLET, EXTENDED RELEASE ORAL at 21:11

## 2022-10-31 RX ADMIN — INSULIN GLARGINE 15 UNITS: 100 INJECTION, SOLUTION SUBCUTANEOUS at 21:11

## 2022-10-31 RX ADMIN — ALBUTEROL SULFATE 2.5 MG: 2.5 SOLUTION RESPIRATORY (INHALATION) at 19:23

## 2022-10-31 RX ADMIN — CARVEDILOL 6.25 MG: 3.12 TABLET, FILM COATED ORAL at 17:38

## 2022-10-31 RX ADMIN — BENZONATATE 200 MG: 100 CAPSULE ORAL at 03:30

## 2022-10-31 NOTE — PROGRESS NOTES
NP notify: Pt BS :[phone] rang over [phone] have to notify. can we give 10 Units insulin or do you want to give another dose.

## 2022-10-31 NOTE — PROGRESS NOTES
PULMONARY NOTE  [facility]    Name: Tram Levi MRN: [id number]   : 1960 Hospital: [facility]   Date: 10/31/2022  Admission date: 10/29/2022 Hospital Day: 3       HPI:     Hospital Problems  Date Reviewed: 10/29/2022            Codes Class Noted POA    Pneumonia ICD-10-CM: J18.9  ICD-9-CM: 261  10/29/2022 Unknown        Sepsis ([address] ICD-10-CM: A41.9  ICD-9-CM: 038.9, 995.91  10/24/2022 Yes        Acute respiratory failure with hypoxia [facility]) ICD-10-CM: J96.01  ICD-9-CM: 518.81  10/24/2022 Yes        Hypokalemia ICD-10-CM: E87.6  ICD-9-CM: 276.8  10/24/2022 Yes        Cavitary pneumonia ICD-10-CM: J18.9, J98.4  ICD-9-CM: 066, 518.89  10/24/2022 Yes              [x] High complexity decision making was performed  [x] See my orders for details      Subjective/Initial History:     I was asked by Osmani Yee MD to see Tram Levi  a 58 y.o.    male in consultation     Excerpts from admission 10/29/2022 or consult notes as follows:   70-year-old male transferred from [facility] he was complaining of a generalized weakness shortness of breath dyspnea cough he is on room air now he has COVID 19 3 weeks ago for the past 1 week his condition got worse he had a CAT scan of the chest done which shows pulmonary lung nodules pneumonia infection he was treated with antibiotics and [place] [facility] he is lying in the bed alert awake he is a current smoker history of diabetes hypertension, so pulmonary consult was called      No Known Allergies     MAR reviewed and pertinent medications noted or modified as needed     Current Facility-Administered Medications   Medication    benzonatate (TESSALON) capsule 200 mg    vancomycin (VANCOCIN) 1,250 mg in 0.9% sodium chloride 250 mL (Haip2Lxl)    VANCOMYCIN TROUGH LEVEL DUE AT 0800 ON 10/31    enoxaparin (LOVENOX) injection 30 mg    piperacillin-tazobactam (ZOSYN) 3.375 g in 0.9% sodium chloride (MBP/ADV) 100 mL MBP    acetylcysteine (MUCOMYST) 200 mg/mL (20 %) solution 200 mg    albuterol CONCENTRATE 2.5mg/0.5 mL neb soln    insulin glargine (LANTUS) injection 15 Units    insulin lispro (HUMALOG) injection    furosemide (LASIX) injection 40 mg    hydrALAZINE (APRESOLINE) 20 mg/mL injection 20 mg    albumin human 25% (BUMINATE) solution 12.5 g    VANCOMYCIN INFORMATION NOTE 1 Each    glucose chewable tablet 16 g    glucagon (GLUCAGEN) injection 1 mg    dextrose 10% infusion 0-250 mL    tamsulosin (FLOMAX) capsule 0.4 mg    lisinopriL (PRINIVIL, ZESTRIL) tablet 10 mg    guaiFENesin ER (MUCINEX) tablet 600 mg    carvediloL (COREG) tablet 6.25 mg    amLODIPine (NORVASC) tablet 10 mg    sodium chloride (NS) flush 5-40 mL    sodium chloride (NS) flush 5-40 mL    acetaminophen (TYLENOL) tablet 650 mg    budesonide-formoteroL (SYMBICORT) 160-4.5 mcg/actuation HFA inhaler 2 Puff      Patient PCP: None  PMH:  has a past medical history of Diabetes ([address] and Hypertension. PSH:   has no past surgical history on file. FHX: family history includes Breast Cancer in his father. SHX:  reports that he has been smoking. He has been smoking an average of .25 packs per day. He has never used smokeless tobacco. He reports current alcohol use. He reports current drug use. Drug: Marijuana. ROS:    Review of Systems   Constitutional:  Positive for malaise/fatigue. HENT: Negative. Eyes: Negative. Respiratory:  Positive for shortness of breath. Cardiovascular:  Positive for orthopnea. Gastrointestinal: Negative. Genitourinary: Negative. Musculoskeletal: Negative. Skin: Negative. Neurological: Negative. Psychiatric/Behavioral: Negative.         Objective:     Vital Signs: Telemetry:    normal sinus rhythm Intake/Output:   Visit Vitals  BP (!) 158/87 (BP 1 Location: Left upper arm, BP Patient Position: At rest)   Pulse 95   Temp 97.9 °F (36.6 °C)   Resp 18   Ht 5' 7\" (1.702 m)   Wt 103 kg (227 lb)   SpO2 99%   BMI 35.55 kg/m²       Temp (24hrs), Av.1 °F (36.7 °C), Min:97.6 °F (36.4 °C), Max:99 °F (37.2 °C)        O2 Device: None (Room air)         Wt Readings from Last 4 Encounters:   10/29/22 103 kg (227 lb)   10/26/22 97.8 kg (215 lb 8 oz)   21 105.7 kg (233 lb)   21 105.7 kg (233 lb)          Intake/Output Summary (Last 24 hours) at 10/31/2022 0853  Last data filed at 10/30/2022 1200  Gross per 24 hour   Intake 350 ml   Output --   Net 350 ml       Last shift:      No intake/output data recorded. Last 3 shifts: 10/29 190 - 10/31 0700  In: 56 [P.O.:675]  Out: -        Physical Exam:     Physical Exam  Constitutional:       Appearance: Normal appearance. HENT:      Head: Normocephalic and atraumatic. Nose: Nose normal.      Mouth/Throat:      Mouth: Mucous membranes are moist.   Eyes:      Pupils: Pupils are equal, round, and reactive to light. Cardiovascular:      Rate and Rhythm: Normal rate and regular rhythm. Pulses: Normal pulses. Heart sounds: Normal heart sounds. Pulmonary:      Effort: Pulmonary effort is normal.      Breath sounds: Normal breath sounds. Abdominal:      General: Abdomen is flat. Bowel sounds are normal.      Palpations: Abdomen is soft. Musculoskeletal:         General: Normal range of motion. Cervical back: Normal range of motion and neck supple. Skin:     General: Skin is warm. Neurological:      General: No focal deficit present. Mental Status: He is alert.    Psychiatric:         Mood and Affect: Mood normal.        Labs:    Recent Labs     10/30/22  0926 10/29/22  0420   WBC 19.7* 26.5*   HGB 8.9* 9.4*    313       Recent Labs     10/30/22  0926 10/29/22  1315 10/29/22  0420     --  137   K 2.9*  --  3.3*     --  102   CO2 30  --  26   *  --  145*   BUN 7  --  8   CREA 0.70  --  0.67   CA 8.8  --  9.1   MG  --   --  2.1   PHOS 2.2*  --   --    LAC  --  1.3  --    ALB 1.7*  --   --    ALT 36  --   --        No results for input(s): PH, PCO2, PO2, HCO3, FIO2 in the last 72 hours. No results for input(s): CPK, CKNDX, TROIQ in the last 72 hours. No lab exists for component: CPKMB  No results found for: BNPP, BNP   Lab Results   Component Value Date/Time    Culture result: No growth 6 days 10/24/2022 02:20 PM    Culture result: No growth 6 days 10/24/2022 02:13 PM     Lab Results   Component Value Date/Time    TSH 1.91 10/30/2022 09:26 AM       Imaging:    CXR Results  (Last 48 hours)      None          Results from [place] encounter on 10/24/22    XR CHEST PORT    Narrative  HISTORY:  -Provided with order: Cough  -Additional: None    Technique : AP PORTABLE CHEST    Comparison : 10/06/14    FINDINGS:    HEART AND MEDIASTINUM: Borderline cardiopericardial silhouette. LUNGS AND PLEURAL SPACES: Ill-defined opacities in the right more than left mid  and lower lung zones, somewhat nodular in appearance. No pleural effusion or  pneumothorax. BONY THORAX AND SOFT TISSUES: Spinal stimulator wires again project over the  midline lower thorax. Impression  Somewhat nodular bilateral parenchymal infiltrates are present, which can  reflect infectious/inflammatory etiologies. If there is a history of malignancy,  metastatic disease is not excluded. Results from [place] encounter on 10/24/22    CTA CHEST W OR W WO CONT    Narrative  EXAM: CTA Chest    CLINICAL INDICATION/HISTORY: Shortness of breath, cough and fever. Possible PE.    COMPARISON: Plain films same day, prior CTA chest 8/28/2008    TECHNIQUE: Axial CT imaging from the thoracic inlet through the diaphragm with  intravenous contrast utilizing CTA study for pulmonary artery evaluation. Coronal and sagittal MIP reformations were generated at a separate workstation.   One or more dose reduction techniques were used on this CT: automated exposure  control, adjustment of the mAs and/or kVp according to patient's size, and  iterative reconstruction techniques. The specific techniques utilized on this CT  exam have been documented in the patient's electronic medical record. Digital  imaging and communications and medicine (DICOM) format image data are available  to nonaffiliated external healthcare facilities or entities on a secure, media  free, reciprocally searchable basis with patient authorization for at least a 12  month period after this study. _______________    FINDINGS:    EXAM QUALITY: Overall exam quality is adequate. Pulmonary arterial enhancement  is adequate. The breath hold is satisfactory. PULMONARY ARTERIES: No convincing evidence of pulmonary embolism. MEDIASTINUM: Normal heart size. No evidence of right heart strain. Aorta is  unremarkable. No pericardial effusion. LYMPH NODES: Interval development of multiple mildly enlarged mediastinal and  bilateral hilar lymph nodes including subcarinal lymph node. AIRWAY: Unremarkable. LUNGS: Interval development of numerous bilateral small lung parenchymal  nodules, some of which are smoothly marginated but some of which have irregular  margins. The largest of these areas of masslike nodular density is within the  anterior right lung apex which has some associated internal air bronchogram but  measures 2.8 x 1.8 x 1.5 cm in greatest cephalocaudad, AP, and transverse  dimensions. There is also small internally cavitated nodule left midlung. PLEURA: No pleural effusion or pneumothorax. UPPER ABDOMEN: Visualized upper abdomen is unremarkable. .    OTHER: No acute or aggressive osseous abnormalities identified. Dorsal cord  stimulator in place.    _______________    Impression  1. No evidence of pulmonary embolism. 2.  Interval development of numerous bilateral lung parenchymal nodules, with  largest nodule right lung apex maximally 2.8 cm with air bronchograms.   Additional several nodules have irregular margins rather than typical smoothly  marginated nodules as seen in metastatic disease. Small cavitated nodule left  lung. Differential diagnosis includes metastatic disease as well as  infectious/inflammatory etiology including septic emboli and can be correlated  clinically. 3. Interval development of mediastinal and bilateral hilar adenopathy, which may  either be neoplastic or inflammatory/infectious in etiology as above.         IMPRESSION:   Bilateral pneumonia  Chronic Obstructive Pulmonary Disease   Diabetes mellitus and hypertension by history  Pt is requiring Drug therapy requiring intensive monitoring for toxicity  Pt is unstable, unpredictable needing inpatient monitoring; is acutely ill and at high risk of sudden decline and decompensation with severe consequenses and continued end organ dysfunction and failure  Prognosis guarded       RECOMMENDATIONS/PLAN:     80-year-old male transferred from another facility because of abnormal CAT scan which shows bilateral lung parenchymal nodules right apical which is 2.8 cm but has air bronchogram and it in small cavitated nodule left lung  Patient started on Zosyn and vancomycin will repeat CAT scan also which shows mediastinal bilateral hilar adenopathy which can be reactive or can be underlying cancer or sarcoidosis if no improvement will consider doing bronchoscopy  Continue COPD management  He needs sleep study as an outpatient  Supplemental O2 to keep sats > 93%  Aspiration precautions  Labs to follow electrolytes, renal function and and blood counts  Glucose monitoring and SSI  Bronchial hygiene with respiratory therapy techniques, bronchodilators  DVT, SUP prophylaxis  Smoking cessation counseling done  Pt needs IV fluids with additives and Drug therapy requiring intensive monitoring for toxicity  Prescription drug management with home med reconciliation reviewed     10/31 patient condition much improved on room air alert awake talking continue with antibiotics awaiting for the CAT scan of the chest for further work-up, unable to produce any sputum        Luis Solorio MD

## 2022-10-31 NOTE — PROGRESS NOTES
Problem: General Medical Care Plan  Goal: *Vital signs within specified parameters  Outcome: Progressing Towards Goal  Goal: *Labs within defined limits  Outcome: Progressing Towards Goal  Goal: *Absence of infection signs and symptoms  Outcome: Progressing Towards Goal  Goal: *Optimal pain control at patient's stated goal  Outcome: Progressing Towards Goal  Goal: *Skin integrity maintained  Outcome: Progressing Towards Goal  Goal: *Fluid volume balance  Outcome: Progressing Towards Goal  Goal: *Optimize nutritional status  Outcome: Progressing Towards Goal  Goal: *Anxiety reduced or absent  Outcome: Progressing Towards Goal  Goal: *Progressive mobility and function (eg: ADL's)  Outcome: Progressing Towards Goal     Problem: Patient Education: Go to Patient Education Activity  Goal: Patient/Family Education  Outcome: Progressing Towards Goal     Problem: Falls - Risk of  Goal: *Absence of Falls  Description: Document Lamine Fall Risk and appropriate interventions in the flowsheet.   Outcome: Progressing Towards Goal  Note: Fall Risk Interventions:            Medication Interventions: Assess postural VS orthostatic hypotension, Patient to call before getting OOB, Teach patient to arise slowly                   Problem: Patient Education: Go to Patient Education Activity  Goal: Patient/Family Education  Outcome: Progressing Towards Goal

## 2022-10-31 NOTE — PROGRESS NOTES
Progress Note    Patient: Saba López MRN: [id number]  SSN: [id number]    YOB: 1960  Age: 58 y.o. Sex: male      Admit Date: 10/29/2022    LOS: 2 days     Subjective:   Patient followed  for sepsis with multinodular pneumonia with cavitation. Sputum cultures are pending but two AFB smears are negative. He is afebrile with increasing WBC today but CRP and procal decreasing, currently on Vancomycin and Zosyn. Patient sitting up  on edge of bed. Daughter at bedside. Objective:     Vitals:    10/31/22 0400 10/31/22 0447 10/31/22 0757 10/31/22 0901   BP:  (!) 148/66 (!) 158/87    Pulse: 98 98 95    Resp:   18    Temp:   97.9 °F (36.6 °C)    SpO2:   99% 99%   Weight:       Height:            Intake and Output:  Current Shift: 10/31 0701 - 10/31 1900  In: 240 [P.O.:240]  Out: 1 [Urine:1]  Last three shifts: 10/29 1901 - 10/31 0700  In: 675 [P.O.:675]  Out: -     Physical Exam:   Vitals and nursing note reviewed. Exam conducted with a chaperone present. Constitutional:       Appearance: He is ill-appearing  Pulmonary:      Effort: No respiratory distress. Breath sounds: Rhonchi present. No wheezing or rales. Abdominal:      General: Bowel sounds are normal.      Palpations: Abdomen is soft. Genitourinary:     Comments: No Hurtado catheter  Musculoskeletal:      Cervical back: Neck supple. Right lower leg: No edema. Left lower leg: No edema. Skin:     Findings: No rash. Neurological:      General: No focal deficit present. Mental Status: He is alert and oriented to person, place, and time. Psychiatric:         Mood and Affect: Mood normal.         Behavior: Behavior normal.         Thought Content:  Thought content normal.         Judgment: Judgment normal.        Lab/Data Review:     WBC 21,600        CRP 13.00 <18.00  Procal 0.33 <0.44    ACE level pending    Fungitell Pending  Aspergillus galactomannan antigen Pending  Quantiferon TB Gold assay Pending    AFB smear and culture (10/27) Negative  AFB smear and culture (10/27) in process  AFB smear and culture (10/28) Negative  Sputum culture (10/30) in process  Sputum culture (10/30) in process  Sputum fungal culture (10/30) in process    CT Chest (10/24)        Assessment:     Principal Problem:    Acute respiratory failure with hypoxia ([address] (10/24/2022)    Active Problems:    Sepsis ([address] (10/24/2022)      Hypokalemia (10/24/2022)      Cavitary pneumonia (10/24/2022)      Pneumonia (10/29/2022)      Multinodular pneumonia with cavitation, etiology unclear, sputum cultures pending, Day #3 IV Vancomycin and Zosyn  Mediastinal and hilar adenopathy  Sepsis with leukocytosis, elevated procal, CRP and ESR  Recent Covid-19 infection  5. Uncontrolled diabetes mellitus     Comment:  Still favor pyogenic infection, eg. MRSA/MSSA/beta strep and he appears to be responding to antibacterial therapy except for WBC. TB unlikely and two AFB smears are negative (though this could be consistent with MAC lung infection). Fungal infection also unlikely.      Plan:   Continue IV Vancomycin and Zosyn  Follow-up blood and sputum culture for bacterial and fungal pathogens;  AFB culture sent from [facility]  In am, repeat CBC, CRP, procal  Follow-up serum fungitell, Aspergillus galactomannan antigen, ACE level         Signed By: Marleny Victor MD     October 31, 2022

## 2022-10-31 NOTE — PROGRESS NOTES
Patient called requesting something to help with his cough. Dr. Lashae Bell notified and order received for Tessalon 200mg PO q8h PRN.

## 2022-10-31 NOTE — PROGRESS NOTES
Vancomycin Dosing Consult  Brisa Gray is a 58 y.o. male with pneumonia (CAP) from [facility] on Vancomycin for 4 days. Pharmacy was consulted by Dr. Rosey Quiles to dose and monitor Vancomycin. Today is day 2. Antibiotic regimen: Vancomycin + Zosyn    Temp (24hrs), Av.1 °F (36.7 °C), Min:97.6 °F (36.4 °C), Max:99 °F (37.2 °C)    Recent Labs     10/31/22  0809 10/30/22  0926 10/29/22  0420   WBC 21.6* 19.7* 26.5*     Recent Labs     10/31/22  0809 10/30/22  0926 10/29/22  0420 10/28/22  0400 10/27/22  0520 10/26/22  0452 10/25/22  0515   CREA 0.93  0.99 0.70 0.67 0.66 0.75 0.79 0.82   BUN 11 7 8 7 9 8 7       Recent Labs     10/31/22  0809 10/30/22  0926 10/24/22  1355   PCT 0.33* 0.44* 1.63       Estimated Creatinine Clearance: 88.5 mL/min (based on SCr of 0.99 mg/dL). ml/min  Concomitant nephrotoxic drugs: None    Cultures:   10/28 sputum: NEG  10/24 blood: NGTD, final    MRSA Swab: Not ordered, patient already received first dose of vancomycin    Target range: AUC/LAURENT 400-600    Recent level history (3):  Date/Time Previous Dose & Interval Measured Level (mcg/mL) Associated AUC/LAURENT   10/29 1225 1000mg IV q8h 10.1 365   10/31/22 @ 0809 1250 mg q8h 20.9 658     Ht Readings from Last 1 Encounters:   10/29/22 170.2 cm (67\")     Wt Readings from Last 1 Encounters:   10/29/22 103 kg (227 lb)     Ideal body weight: 66.1 kg (145 lb 11.6 oz)  Adjusted ideal body weight: 80.8 kg (178 lb 3.8 oz)        Assessment/Plan:   Leukocytosis, Scr has increased form 0.7 to 0.99 today   Vancomycin AUC is  supra-therapeutic at 658  Discontinued Vancomycin 1250mg IV q8h. And started pt back to 1000 mg Vancomycin q8h.   Pharmacy will order a trough level on 22 at 0900  Antimicrobial stop date TBD

## 2022-10-31 NOTE — PROGRESS NOTES
Hospitalist Progress Note         VIANEY Rivera, FNP-C    Daily Progress Note: 10/31/2022      Subjective:   Subjective   Patient examined alert and oriented standing in his room. He reports feeling lightheaded. Endorses no chest pain or sob. Review of Systems:   Review of Systems   Constitutional:  Positive for malaise/fatigue. Negative for chills and fever. Respiratory:  Positive for cough. Negative for shortness of breath and wheezing. Cardiovascular:  Negative for chest pain. Gastrointestinal:  Negative for abdominal pain, nausea and vomiting. Musculoskeletal:  Negative for myalgias. Neurological:  Positive for dizziness. Objective:   Objective      Vitals:  Patient Vitals for the past 12 hrs:   Temp Pulse Resp BP SpO2   10/31/22 0901 -- -- -- -- 99 %   10/31/22 0757 97.9 °F (36.6 °C) 95 18 (!) 158/87 99 %   10/31/22 0447 -- 98 -- (!) 148/66 --   10/31/22 0400 -- 98 -- -- --        Physical Exam:  Physical Exam  Vitals and nursing note reviewed. Constitutional:       Appearance: Normal appearance. Eyes:      Extraocular Movements: Extraocular movements intact. Cardiovascular:      Rate and Rhythm: Normal rate. Pulmonary:      Effort: Pulmonary effort is normal.      Breath sounds: Rhonchi present. Abdominal:      General: Bowel sounds are normal.   Musculoskeletal:         General: Normal range of motion. Neurological:      Mental Status: He is alert and oriented to person, place, and time.         Lab Results:  Recent Results (from the past 24 hour(s))   GLUCOSE, POC    Collection Time: 10/30/22  3:48 PM   Result Value Ref Range    Glucose (POC) 246 (H) 65 - 100 mg/dL    Performed by Madhuri Benton, POC    Collection Time: 10/30/22  9:24 PM   Result Value Ref Range    Glucose (POC) 291 (H) 65 - 100 mg/dL    Performed by Dotty Boland    GLUCOSE, POC    Collection Time: 10/31/22  7:38 AM   Result Value Ref Range    Glucose (POC) 324 (H) 65 - 100 mg/dL Performed by [employer]([facility])    CBC WITH AUTOMATED DIFF    Collection Time: 10/31/22  8:09 AM   Result Value Ref Range    WBC 21.6 (H) 4.1 - 11.1 K/uL    RBC 3.52 (L) 4.10 - 5.70 M/uL    HGB 9.0 (L) 12.1 - 17.0 g/dL    HCT 28.5 (L) 36.6 - 50.3 %    MCV 81.0 80.0 - 99.0 FL    MCH 25.6 (L) 26.0 - 34.0 PG    MCHC 31.6 30.0 - 36.5 g/dL    RDW 15.2 (H) 11.5 - 14.5 %    PLATELET 605 (H) 435 - 400 K/uL    MPV 9.9 8.9 - 12.9 FL    NRBC 0.0 0.0  WBC    ABSOLUTE NRBC 0.00 0.00 - 0.01 K/uL    NEUTROPHILS 89 (H) 32 - 75 %    LYMPHOCYTES 6 (L) 12 - 49 %    MONOCYTES 3 (L) 5 - 13 %    EOSINOPHILS 0 0 - 7 %    BASOPHILS 0 0 - 1 %    IMMATURE GRANULOCYTES 2 (H) 0 - 0.5 %    ABS. NEUTROPHILS 19.3 (H) 1.8 - 8.0 K/UL    ABS. LYMPHOCYTES 1.3 0.8 - 3.5 K/UL    ABS. MONOCYTES 0.7 0.0 - 1.0 K/UL    ABS. EOSINOPHILS 0.0 0.0 - 0.4 K/UL    ABS. BASOPHILS 0.0 0.0 - 0.1 K/UL    ABS. IMM. GRANS. 0.3 (H) 0.00 - 0.04 K/UL    DF AUTOMATED     METABOLIC PANEL, COMPREHENSIVE    Collection Time: 10/31/22  8:09 AM   Result Value Ref Range    Sodium 137 136 - 145 mmol/L    Potassium 3.6 3.5 - 5.1 mmol/L    Chloride 104 97 - 108 mmol/L    CO2 29 21 - 32 mmol/L    Anion gap 4 (L) 5 - 15 mmol/L    Glucose 331 (H) 65 - 100 mg/dL    BUN 11 6 - 20 mg/dL    Creatinine 0.99 0.70 - 1.30 mg/dL    BUN/Creatinine ratio 11 (L) 12 - 20      eGFR >60 >60 ml/min/1.73m2    Calcium 9.4 8.5 - 10.1 mg/dL    Bilirubin, total 0.2 0.2 - 1.0 mg/dL    AST (SGOT) 24 15 - 37 U/L    ALT (SGPT) 32 12 - 78 U/L    Alk.  phosphatase 125 (H) 45 - 117 U/L    Protein, total 7.3 6.4 - 8.2 g/dL    Albumin 2.1 (L) 3.5 - 5.0 g/dL    Globulin 5.2 (H) 2.0 - 4.0 g/dL    A-G Ratio 0.4 (L) 1.1 - 2.2     C REACTIVE PROTEIN, QT    Collection Time: 10/31/22  8:09 AM   Result Value Ref Range    C-Reactive protein 13.00 (H) 0.00 - 0.60 mg/dL   PROCALCITONIN    Collection Time: 10/31/22  8:09 AM   Result Value Ref Range    Procalcitonin 0.33 (H) 0 ng/mL   VANCOMYCIN, TROUGH    Collection Time: 10/31/22  8:09 AM   Result Value Ref Range    Vancomycin,trough 20.9 (HH) 5.0 - 10.0 ug/mL   CREATININE    Collection Time: 10/31/22  8:09 AM   Result Value Ref Range    Creatinine 0.93 0.70 - 1.30 mg/dL   GLUCOSE, POC    Collection Time: 10/31/22 11:19 AM   Result Value Ref Range    Glucose (POC) 351 (H) 65 - 100 mg/dL    Performed by Sharifa Neal(PCT)       Results       Procedure Component Value Units Date/Time    CULTURE, SPUTUM/BRONCH/OTH [[id number]] Collected: 10/30/22 1730    Order Status: Sent Specimen: Sputum Updated: 10/30/22 1913    CULTURE, RESPIRATORY/SPUTUM/BRONCH Elihue Juan Francisco STAIN [[id number]] Collected: 10/30/22 1730    Order Status: Sent Specimen: Sputum Updated: 10/30/22 1914    CULTURE, FUNGUS [[id number]] Collected: 10/30/22 1730    Order Status: Sent Specimen: Sputum Updated: 10/30/22 1914    CULTURE AFB AND SMEAR [[id number]] Collected: 10/28/22 07    Order Status: Completed Specimen: Sputum Updated: 10/29/22 143     Source SPUTUM        AFB Specimen processing Concentration     Acid Fast Smear Negative        Comment: Performed At: [facility] [place] [id number]  Bill Griffith MD AP:[id number]          Acid Fast Culture PENDING    CULTURE AFB AND SMEAR [[id number]] Collected: 10/27/22 1445    Order Status: Sent Specimen: Sputum Updated: 10/27/22 1506    CULTURE AFB AND SMEAR [[id number]] Collected: 10/27/22 0432    Order Status: Completed Specimen: Sputum Updated: 10/30/22 1636     Source Sputum,et suction        AFB Specimen processing Concentration     Acid Fast Smear Negative        Comment: Performed At: [facility] [place] [id number]  Bill Griffith MD H          Acid Fast Culture PENDING    QUANTIFERON-TB GOLD PLUS [[id number]] Collected: 10/24/22 1505    Order Status: Sent Specimen: Blood from Serum Updated: 10/25/22 151    CULTURE, BLOOD [[id number]] Collected: 10/24/22 1420    Order Status: Completed Specimen: Blood Updated: 10/30/22 0949     Special Requests: No Special Requests        Culture result: No growth 6 days       CULTURE, BLOOD [[id number]] Collected: 10/24/22 1413    Order Status: Completed Specimen: Blood Updated: 10/30/22 0949     Special Requests: No Special Requests        Culture result: No growth 6 days       COVID-19 WITH INFLUENZA A/B [[id number]]     Order Status: Canceled Specimen: Nasopharyngeal     COVID-19 RAPID TEST [[id number]] Collected: 10/24/22 1355    Order Status: Completed Specimen: Nasopharyngeal Updated: 10/24/22 1455     COVID-19 rapid test Not Detected        Comment:   Rapid NAAT:  The specimen is NEGATIVE for SARS-CoV-2, the novel coronavirus associated with COVID-19. Negative results should be treated as presumptive and, if inconsistent with clinical signs and symptoms or necessary for patient management, should be tested with an alternative molecular assay. Negative results do not preclude SARS-CoV-2 infection and should not be used as the sole basis for patient management decisions. This test has been authorized by the FDA under an Emergency Use   Authorization (EUA) for use by authorized laboratories.  Fact sheet for Healthcare Providers: ConventionUpdate.co.nz Fact sheet for Patients: ConventionUpdate.co.nz   Methodology: Isothermal Nucleic Acid Amplification         INFLUENZA A & B AG (RAPID TEST) [[id number]] Collected: 10/24/22 1355    Order Status: Completed Specimen: Nasopharyngeal from Nasal washing Updated: 10/24/22 1500     Influenza A Antigen Negative        Influenza B Antigen Negative                Diagnostic Images:  CT Results  (Last 48 hours)      None             CT CHEST WO CONT    (Results Pending)             Current Medications:    Current Facility-Administered Medications:     benzonatate (TESSALON) capsule 200 mg, 200 mg, Oral, Q8H PRN, Golden Andrews MD, 200 mg at 10/31/22 0330    vancomycin (VANCOCIN) 1,000 mg in 0.9% sodium chloride 250 mL ([id number]), 1,000 mg, IntraVENous, Q8H, Adriana Paez MD, Last Rate: 250 mL/hr at 10/31/22 1111, 1,000 mg at 10/31/22 1111    [START ON 11/1/2022] Vancomycin trough level to be drawn on 11/1/22  at 0900 am ., , Other, ONCE, Adriana Paez MD    [facility] LEVEL DUE AT 0800 ON 10/31, , Other, LankinHeraclio MD    enoxaparin (LOVENOX) injection 30 mg, 30 mg, SubCUTAneous, Q12H, Heraclio Sarmiento MD, 30 mg at 10/31/22 0508    piperacillin-tazobactam (ZOSYN) 3.375 g in 0.9% sodium chloride (MBP/ADV) 100 mL MBP, 3.375 g, IntraVENous, Q8H, Heraclio Sarmiento MD, Last Rate: 25 mL/hr at 10/31/22 1333, 3.375 g at 10/31/22 1333    acetylcysteine (MUCOMYST) 200 mg/mL (20 %) solution 200 mg, 200 mg, Nebulization, Q6HWA RT, Ash Kemp MD, 200 mg at 10/31/22 0858    albuterol CONCENTRATE 2.5mg/0.5 mL neb soln, 2.5 mg, Nebulization, Q6HWA RT, Ash Kemp MD, 2.5 mg at 10/31/22 0858    insulin glargine (LANTUS) injection 15 Units, 15 Units, SubCUTAneous, QHS, [facility], 15 Units at 10/30/22 2125    insulin lispro (HUMALOG) injection, , SubCUTAneous, TIDAC, Cecille Rivera PA, 10 Units at 10/31/22 0800    furosemide (LASIX) injection 40 mg, 40 mg, IntraVENous, BID, Madi Rivera PA, 40 mg at 10/31/22 1433    hydrALAZINE (APRESOLINE) 20 mg/mL injection 20 mg, 20 mg, IntraVENous, Q4H PRN, Madi Rivera PA, 20 mg at 10/31/22 0108    albumin human 25% (BUMINATE) solution 12.5 g, 12.5 g, IntraVENous, Q6H, Gay Garcia MD, 12.5 g at 10/31/22 0800    VANCOMYCIN INFORMATION NOTE 1 Each, 1 Each, Other, Rx Dosing/Monitoring, Heraclio Sarmiento MD    glucose chewable tablet 16 g, 4 Tablet, Oral, PRN, Heraclio Sarmiento MD    glucagon (GLUCAGEN) injection 1 mg, 1 mg, IntraMUSCular, PRN, Heraclio Sarmiento MD    dextrose 10% infusion 0-250 mL, 0-250 mL, IntraVENous, PRN, Heraclio Sarmiento MD    tamsulosin (FLOMAX) capsule 0.4 mg, 0.4 mg, Oral, QPM, Cordell Ryan MD, 0.4 mg at 10/30/22 1719    lisinopriL (PRINIVIL, ZESTRIL) tablet 10 mg, 10 mg, Oral, DAILY, Cordell Ryan MD, 10 mg at 10/31/22 0914    guaiFENesin ER (MUCINEX) tablet 600 mg, 600 mg, Oral, Q12H, Cordell Ryan MD, 600 mg at 10/31/22 0914    carvediloL (COREG) tablet 6.25 mg, 6.25 mg, Oral, BID WITH MEALS, Cordell Ryan MD, 6.25 mg at 10/31/22 0800    amLODIPine (NORVASC) tablet 10 mg, 10 mg, Oral, DAILY, Cordell Ryan MD, 10 mg at 10/31/22 3375    sodium chloride (NS) flush 5-40 mL, 5-40 mL, IntraVENous, Q8H, Cordell Ryan MD, 10 mL at 10/31/22 0528    sodium chloride (NS) flush 5-40 mL, 5-40 mL, IntraVENous, PRN, Cordell Ryan MD    acetaminophen (TYLENOL) tablet 650 mg, 650 mg, Oral, Q4H PRN, Cordell Ryan MD    budesonide-formoteroL (SYMBICORT) 160-4.5 mcg/actuation HFA inhaler 2 Puff, 2 Puff, Inhalation, BID RT, Cordell Ryan MD, 2 Puff at 10/31/22 9818       ASSESSMENT:  58 y.o. male with history of diabetes, hypertension, and COVID 3 weeks ago, that presents to the referring facility via  emergency room complaining of cough for more than 2 weeks that was gradually worsening with shortness of breath that started 1 week ago along with fever. He reports that his symptoms worsened and he began to develop a fever/chills and lose his appetite. On evaluation in the emergency room he is found with nodular bilateral parenchymal infiltrates and a WBC of 28.4. CT of the chest revealed worsening parenchymal nodules with air bronchograms. He was admitted and started on antibiotics and IV fluids. Unable to perform the necessary treatment the patient was transferred from [facility] to [facility]. Pulmonary and ID consulted. He was started on IVF, Zosyn and vancomycin.  Labs show an elevated WBC, normocytic anemia, hypokalemia, elevated glucose,elevated BNP, hypoalbuminemia, and an elevated CRP and procal. continue to follow sputum culture. # 1 Sepsis secondary to cavitary PNA   --Continue Zosyn and Vancomycin  --Continue Mucinex and Solumedrol  --PRN supplemental on 2L NC  --CT revealed numerous bilateral lung parenchymal nodules, with largest nodule right lung apex maximally 2.8 cm with air bronchograms  --Sputum cultures pending  --Pulmonary: [place]  --ID: Jason Mitchell     # 2 hypoalbuminemia  --Administer albumin x 4 doses     # 3 Elevated BNP, CHF exacerbation?  --echo pending  --Continue gentle diuresis with Lasix 40mg     # 4 Normocytic Anemia  --hgb remains stable  --continue to trend h/h  --transfuse for hgb <7     # 5 COPD  --Continue home medications     # 6 Diabetes, uncontrolled  --on insulin; 15 units Lantus plus sliding scale   --A1c 7.7  --start metformin 500mg bid      # 7 HTN  --BP slightly elevated  --Continue amlodipine 10mg, coreg 6.25 mg bid,   --Increase lisinopril 40mg  --Hydralazine added for SBP >160     # 8 Hypokalemia  --Replenish with oral Potassium  --Continue to monitor     # 9 BPH  --continue tamulosin      Full Code  Dvt Prophylaxis lovenox  GI Prophylaxis none  Discharge barriers:  -pending clinical improvement      Above treatment plan reviewed and discussed with patient in detail at bedside, all questions answered. Care Plan discussed with: Patient/Family    Total time spent with patient: 30 minutes.     Hailey Davis NP

## 2022-11-01 LAB
ALBUMIN SERPL-MCNC: 2.3 G/DL (ref 3.5–5)
ALBUMIN/GLOB SERPL: 0.5 {RATIO} (ref 1.1–2.2)
ALP SERPL-CCNC: 112 U/L (ref 45–117)
ALT SERPL-CCNC: 41 U/L (ref 12–78)
ANION GAP SERPL CALC-SCNC: 5 MMOL/L (ref 5–15)
AST SERPL W P-5'-P-CCNC: 38 U/L (ref 15–37)
BACTERIA SPEC CULT: NORMAL
BASOPHILS # BLD: 0 K/UL (ref 0–0.1)
BASOPHILS NFR BLD: 0 % (ref 0–1)
BILIRUB SERPL-MCNC: 0.3 MG/DL (ref 0.2–1)
BUN SERPL-MCNC: 18 MG/DL (ref 6–20)
BUN/CREAT SERPL: 14 (ref 12–20)
CA-I BLD-MCNC: 9.5 MG/DL (ref 8.5–10.1)
CHLORIDE SERPL-SCNC: 107 MMOL/L (ref 97–108)
CO2 SERPL-SCNC: 32 MMOL/L (ref 21–32)
CREAT SERPL-MCNC: 1.25 MG/DL (ref 0.7–1.3)
CRP SERPL-MCNC: 7.58 MG/DL (ref 0–0.6)
DIFFERENTIAL METHOD BLD: ABNORMAL
EOSINOPHIL # BLD: 0.1 K/UL (ref 0–0.4)
EOSINOPHIL NFR BLD: 0 % (ref 0–7)
ERYTHROCYTE [DISTWIDTH] IN BLOOD BY AUTOMATED COUNT: 15.5 % (ref 11.5–14.5)
GALACTOMANNAN AG SPEC IA-ACNC: 0.04 INDEX (ref 0–0.49)
GLOBULIN SER CALC-MCNC: 4.8 G/DL (ref 2–4)
GLUCOSE BLD STRIP.AUTO-MCNC: 183 MG/DL (ref 65–100)
GLUCOSE BLD STRIP.AUTO-MCNC: 220 MG/DL (ref 65–100)
GLUCOSE BLD STRIP.AUTO-MCNC: 237 MG/DL (ref 65–100)
GLUCOSE BLD STRIP.AUTO-MCNC: 263 MG/DL (ref 65–100)
GLUCOSE BLD STRIP.AUTO-MCNC: 377 MG/DL (ref 65–100)
GLUCOSE SERPL-MCNC: 165 MG/DL (ref 65–100)
GRAM STN SPEC: NORMAL
HCT VFR BLD AUTO: 31.1 % (ref 36.6–50.3)
HGB BLD-MCNC: 9.7 G/DL (ref 12.1–17)
IMM GRANULOCYTES # BLD AUTO: 0.2 K/UL (ref 0–0.04)
IMM GRANULOCYTES NFR BLD AUTO: 1 % (ref 0–0.5)
LYMPHOCYTES # BLD: 1.8 K/UL (ref 0.8–3.5)
LYMPHOCYTES NFR BLD: 8 % (ref 12–49)
MCH RBC QN AUTO: 25.5 PG (ref 26–34)
MCHC RBC AUTO-ENTMCNC: 31.2 G/DL (ref 30–36.5)
MCV RBC AUTO: 81.6 FL (ref 80–99)
MONOCYTES # BLD: 2.1 K/UL (ref 0–1)
MONOCYTES NFR BLD: 9 % (ref 5–13)
NEUTS SEG # BLD: 17.9 K/UL (ref 1.8–8)
NEUTS SEG NFR BLD: 82 % (ref 32–75)
NRBC # BLD: 0 K/UL (ref 0–0.01)
NRBC BLD-RTO: 0 PER 100 WBC
PERFORMED BY, TECHID: ABNORMAL
PLATELET # BLD AUTO: 533 K/UL (ref 150–400)
PMV BLD AUTO: 9.6 FL (ref 8.9–12.9)
POTASSIUM SERPL-SCNC: 3.6 MMOL/L (ref 3.5–5.1)
PROCALCITONIN SERPL-MCNC: 0.31 NG/ML
PROT SERPL-MCNC: 7.1 G/DL (ref 6.4–8.2)
RBC # BLD AUTO: 3.81 M/UL (ref 4.1–5.7)
SODIUM SERPL-SCNC: 144 MMOL/L (ref 136–145)
SPECIAL REQUESTS,SREQ: NORMAL
VANCOMYCIN TROUGH SERPL-MCNC: 31.4 UG/ML (ref 5–10)
WBC # BLD AUTO: 22.2 K/UL (ref 4.1–11.1)

## 2022-11-01 PROCEDURE — 74011250636 HC RX REV CODE- 250/636: Performed by: LICENSED PRACTICAL NURSE

## 2022-11-01 PROCEDURE — 36415 COLL VENOUS BLD VENIPUNCTURE: CPT

## 2022-11-01 PROCEDURE — 82962 GLUCOSE BLOOD TEST: CPT

## 2022-11-01 PROCEDURE — 74011000250 HC RX REV CODE- 250: Performed by: INTERNAL MEDICINE

## 2022-11-01 PROCEDURE — 80202 ASSAY OF VANCOMYCIN: CPT

## 2022-11-01 PROCEDURE — 65270000029 HC RM PRIVATE

## 2022-11-01 PROCEDURE — 74011250637 HC RX REV CODE- 250/637: Performed by: HOSPITALIST

## 2022-11-01 PROCEDURE — 80053 COMPREHEN METABOLIC PANEL: CPT

## 2022-11-01 PROCEDURE — 84145 PROCALCITONIN (PCT): CPT

## 2022-11-01 PROCEDURE — 94761 N-INVAS EAR/PLS OXIMETRY MLT: CPT

## 2022-11-01 PROCEDURE — 74011636637 HC RX REV CODE- 636/637: Performed by: LICENSED PRACTICAL NURSE

## 2022-11-01 PROCEDURE — 99232 SBSQ HOSP IP/OBS MODERATE 35: CPT | Performed by: INTERNAL MEDICINE

## 2022-11-01 PROCEDURE — 94640 AIRWAY INHALATION TREATMENT: CPT

## 2022-11-01 PROCEDURE — 74011250636 HC RX REV CODE- 250/636: Performed by: HOSPITALIST

## 2022-11-01 PROCEDURE — 85025 COMPLETE CBC W/AUTO DIFF WBC: CPT

## 2022-11-01 PROCEDURE — 74011000258 HC RX REV CODE- 258: Performed by: HOSPITALIST

## 2022-11-01 PROCEDURE — 86140 C-REACTIVE PROTEIN: CPT

## 2022-11-01 PROCEDURE — 02HV33Z INSERTION OF INFUSION DEVICE INTO SUPERIOR VENA CAVA, PERCUTANEOUS APPROACH: ICD-10-PCS | Performed by: HOSPITALIST

## 2022-11-01 PROCEDURE — 74011250637 HC RX REV CODE- 250/637: Performed by: NURSE PRACTITIONER

## 2022-11-01 PROCEDURE — 74011250636 HC RX REV CODE- 250/636: Performed by: INTERNAL MEDICINE

## 2022-11-01 PROCEDURE — 74011000250 HC RX REV CODE- 250: Performed by: HOSPITALIST

## 2022-11-01 RX ADMIN — PIPERACILLIN AND TAZOBACTAM 3.38 G: 3; .375 INJECTION, POWDER, FOR SOLUTION INTRAVENOUS at 16:30

## 2022-11-01 RX ADMIN — INSULIN GLARGINE 15 UNITS: 100 INJECTION, SOLUTION SUBCUTANEOUS at 21:06

## 2022-11-01 RX ADMIN — CARVEDILOL 6.25 MG: 3.12 TABLET, FILM COATED ORAL at 09:05

## 2022-11-01 RX ADMIN — POTASSIUM CHLORIDE 40 MEQ: 1500 TABLET, EXTENDED RELEASE ORAL at 09:05

## 2022-11-01 RX ADMIN — PIPERACILLIN AND TAZOBACTAM 3.38 G: 3; .375 INJECTION, POWDER, FOR SOLUTION INTRAVENOUS at 09:06

## 2022-11-01 RX ADMIN — AMLODIPINE BESYLATE 10 MG: 5 TABLET ORAL at 09:05

## 2022-11-01 RX ADMIN — FUROSEMIDE 40 MG: 10 INJECTION, SOLUTION INTRAMUSCULAR; INTRAVENOUS at 21:06

## 2022-11-01 RX ADMIN — FUROSEMIDE 40 MG: 10 INJECTION, SOLUTION INTRAMUSCULAR; INTRAVENOUS at 09:06

## 2022-11-01 RX ADMIN — POTASSIUM CHLORIDE 40 MEQ: 1500 TABLET, EXTENDED RELEASE ORAL at 21:06

## 2022-11-01 RX ADMIN — ENOXAPARIN SODIUM 30 MG: 100 INJECTION SUBCUTANEOUS at 18:29

## 2022-11-01 RX ADMIN — SODIUM CHLORIDE, PRESERVATIVE FREE 10 ML: 5 INJECTION INTRAVENOUS at 15:24

## 2022-11-01 RX ADMIN — METFORMIN HYDROCHLORIDE 500 MG: 500 TABLET ORAL at 09:05

## 2022-11-01 RX ADMIN — GUAIFENESIN 600 MG: 600 TABLET, EXTENDED RELEASE ORAL at 21:06

## 2022-11-01 RX ADMIN — METFORMIN HYDROCHLORIDE 500 MG: 500 TABLET ORAL at 16:29

## 2022-11-01 RX ADMIN — CARVEDILOL 6.25 MG: 3.12 TABLET, FILM COATED ORAL at 16:29

## 2022-11-01 RX ADMIN — SODIUM CHLORIDE, PRESERVATIVE FREE 10 ML: 5 INJECTION INTRAVENOUS at 05:59

## 2022-11-01 RX ADMIN — GUAIFENESIN 600 MG: 600 TABLET, EXTENDED RELEASE ORAL at 09:06

## 2022-11-01 RX ADMIN — BUDESONIDE AND FORMOTEROL FUMARATE DIHYDRATE 2 PUFF: 160; 4.5 AEROSOL RESPIRATORY (INHALATION) at 07:49

## 2022-11-01 RX ADMIN — PIPERACILLIN AND TAZOBACTAM 3.38 G: 3; .375 INJECTION, POWDER, FOR SOLUTION INTRAVENOUS at 04:37

## 2022-11-01 RX ADMIN — INSULIN LISPRO 3 UNITS: 100 INJECTION, SOLUTION INTRAVENOUS; SUBCUTANEOUS at 09:05

## 2022-11-01 RX ADMIN — INSULIN LISPRO 4 UNITS: 100 INJECTION, SOLUTION INTRAVENOUS; SUBCUTANEOUS at 18:20

## 2022-11-01 RX ADMIN — TAMSULOSIN HYDROCHLORIDE 0.4 MG: 0.4 CAPSULE ORAL at 18:20

## 2022-11-01 RX ADMIN — SODIUM CHLORIDE, PRESERVATIVE FREE 10 ML: 5 INJECTION INTRAVENOUS at 21:06

## 2022-11-01 RX ADMIN — ALBUTEROL SULFATE 2.5 MG: 2.5 SOLUTION RESPIRATORY (INHALATION) at 20:00

## 2022-11-01 RX ADMIN — LISINOPRIL 40 MG: 40 TABLET ORAL at 09:05

## 2022-11-01 RX ADMIN — BUDESONIDE AND FORMOTEROL FUMARATE DIHYDRATE 2 PUFF: 160; 4.5 AEROSOL RESPIRATORY (INHALATION) at 20:00

## 2022-11-01 RX ADMIN — ALBUTEROL SULFATE 2.5 MG: 2.5 SOLUTION RESPIRATORY (INHALATION) at 07:34

## 2022-11-01 RX ADMIN — ACETYLCYSTEINE 200 MG: 200 SOLUTION ORAL; RESPIRATORY (INHALATION) at 20:00

## 2022-11-01 RX ADMIN — VANCOMYCIN HYDROCHLORIDE 1000 MG: 1 INJECTION, POWDER, LYOPHILIZED, FOR SOLUTION INTRAVENOUS at 04:37

## 2022-11-01 RX ADMIN — ACETYLCYSTEINE 200 MG: 200 SOLUTION ORAL; RESPIRATORY (INHALATION) at 07:34

## 2022-11-01 RX ADMIN — INSULIN LISPRO 5 UNITS: 100 INJECTION, SOLUTION INTRAVENOUS; SUBCUTANEOUS at 12:09

## 2022-11-01 RX ADMIN — ENOXAPARIN SODIUM 30 MG: 100 INJECTION SUBCUTANEOUS at 05:59

## 2022-11-01 NOTE — PROGRESS NOTES
Hospitalist Progress Note         Brooksmagui AnguloVIANEY prakash, FNP-C    Daily Progress Note: 11/1/2022      Subjective:   Subjective   Patient examined alert and oriented ambulating in his room. Endorses no chest pain or sob. Requesting to discharge home. Informed patient will culture report before he can discharge. Review of Systems:   Review of Systems   Constitutional:  Positive for malaise/fatigue. Negative for chills and fever. Respiratory:  Positive for cough. Negative for shortness of breath and wheezing. Cardiovascular:  Negative for chest pain. Gastrointestinal:  Negative for abdominal pain, nausea and vomiting. Musculoskeletal:  Negative for myalgias. Neurological:  Positive for dizziness. Objective:   Objective      Vitals:  Patient Vitals for the past 12 hrs:   Temp Pulse Resp BP SpO2   11/01/22 0952 97.6 °F (36.4 °C) 94 16 (!) 155/81 95 %   11/01/22 0858 98 °F (36.7 °C) 99 16 (!) 163/79 96 %   11/01/22 0734 -- -- -- -- 95 %          Physical Exam  Vitals and nursing note reviewed. Constitutional:       Appearance: Normal appearance. Eyes:      Extraocular Movements: Extraocular movements intact. Cardiovascular:      Rate and Rhythm: Normal rate. Pulmonary:      Effort: Pulmonary effort is normal.   Abdominal:      General: Bowel sounds are normal.   Musculoskeletal:         General: Normal range of motion. Neurological:      Mental Status: He is alert and oriented to person, place, and time.         Lab Results:  Recent Results (from the past 24 hour(s))   GLUCOSE, POC    Collection Time: 10/31/22  3:34 PM   Result Value Ref Range    Glucose (POC) 277 (H) 65 - 100 mg/dL    Performed by Atilio Ha)    GLUCOSE, POC    Collection Time: 10/31/22  4:09 PM   Result Value Ref Range    Glucose (POC) 343 (H) 65 - 100 mg/dL    Performed by Christofer Dumont    GLUCOSE, POC    Collection Time: 10/31/22  7:25 PM   Result Value Ref Range    Glucose (POC) 229 (H) 65 - 100 mg/dL    Performed by Kelvin Sultana    GLUCOSE, POC    Collection Time: 11/01/22  8:08 AM   Result Value Ref Range    Glucose (POC) 183 (H) 65 - 100 mg/dL    Performed by Kevin iPckens    CBC WITH AUTOMATED DIFF    Collection Time: 11/01/22  9:00 AM   Result Value Ref Range    WBC 22.2 (H) 4.1 - 11.1 K/uL    RBC 3.81 (L) 4.10 - 5.70 M/uL    HGB 9.7 (L) 12.1 - 17.0 g/dL    HCT 31.1 (L) 36.6 - 50.3 %    MCV 81.6 80.0 - 99.0 FL    MCH 25.5 (L) 26.0 - 34.0 PG    MCHC 31.2 30.0 - 36.5 g/dL    RDW 15.5 (H) 11.5 - 14.5 %    PLATELET 299 (H) 500 - 400 K/uL    MPV 9.6 8.9 - 12.9 FL    NRBC 0.0 0.0  WBC    ABSOLUTE NRBC 0.00 0.00 - 0.01 K/uL    NEUTROPHILS 82 (H) 32 - 75 %    LYMPHOCYTES 8 (L) 12 - 49 %    MONOCYTES 9 5 - 13 %    EOSINOPHILS 0 0 - 7 %    BASOPHILS 0 0 - 1 %    IMMATURE GRANULOCYTES 1 (H) 0 - 0.5 %    ABS. NEUTROPHILS 17.9 (H) 1.8 - 8.0 K/UL    ABS. LYMPHOCYTES 1.8 0.8 - 3.5 K/UL    ABS. MONOCYTES 2.1 (H) 0.0 - 1.0 K/UL    ABS. EOSINOPHILS 0.1 0.0 - 0.4 K/UL    ABS. BASOPHILS 0.0 0.0 - 0.1 K/UL    ABS. IMM. GRANS. 0.2 (H) 0.00 - 0.04 K/UL    DF AUTOMATED     METABOLIC PANEL, COMPREHENSIVE    Collection Time: 11/01/22  9:00 AM   Result Value Ref Range    Sodium 144 136 - 145 mmol/L    Potassium 3.6 3.5 - 5.1 mmol/L    Chloride 107 97 - 108 mmol/L    CO2 32 21 - 32 mmol/L    Anion gap 5 5 - 15 mmol/L    Glucose 165 (H) 65 - 100 mg/dL    BUN 18 6 - 20 mg/dL    Creatinine 1.25 0.70 - 1.30 mg/dL    BUN/Creatinine ratio 14 12 - 20      eGFR >60 >60 ml/min/1.73m2    Calcium 9.5 8.5 - 10.1 mg/dL    Bilirubin, total 0.3 0.2 - 1.0 mg/dL    AST (SGOT) 38 (H) 15 - 37 U/L    ALT (SGPT) 41 12 - 78 U/L    Alk.  phosphatase 112 45 - 117 U/L    Protein, total 7.1 6.4 - 8.2 g/dL    Albumin 2.3 (L) 3.5 - 5.0 g/dL    Globulin 4.8 (H) 2.0 - 4.0 g/dL    A-G Ratio 0.5 (L) 1.1 - 2.2     C REACTIVE PROTEIN, QT    Collection Time: 11/01/22  9:00 AM   Result Value Ref Range    C-Reactive protein 7.58 (H) 0.00 - 0.60 mg/dL   PROCALCITONIN Collection Time: 11/01/22  9:00 AM   Result Value Ref Range    Procalcitonin 0.31 (H) 0 ng/mL   VANCOMYCIN, TROUGH    Collection Time: 11/01/22  9:00 AM   Result Value Ref Range    Vancomycin,trough 31.4 (HH) 5.0 - 10.0 ug/mL   GLUCOSE, POC    Collection Time: 11/01/22 11:34 AM   Result Value Ref Range    Glucose (POC) 377 (H) 65 - 100 mg/dL    Performed by Jean-Paul Barnett    GLUCOSE, POC    Collection Time: 11/01/22  1:48 PM   Result Value Ref Range    Glucose (POC) 263 (H) 65 - 100 mg/dL    Performed by Jean-Paul Barnett       Results       Procedure Component Value Units Date/Time    CULTURE, SPUTUM/BRONCH/OTH [[id number]] Collected: 10/30/22 1730    Order Status: Completed Specimen: Sputum Updated: 11/01/22 0155     Special Requests: No Special Requests        GRAM STAIN Occasional WBCs seen               Rare Epithelial cells seen            Rare Gram Negative Rods        Culture result: PENDING    CULTURE, RESPIRATORY/SPUTUM/BRONCH Murriel Hutching STAIN [[id number]] Collected: 10/30/22 1730    Order Status: Sent Specimen: Sputum Updated: 10/30/22 1914    CULTURE, FUNGUS [[id number]] Collected: 10/30/22 1730    Order Status: Canceled Specimen: Sputum     CULTURE, FUNGUS [[id number]] Collected: 10/30/22 1730    Order Status: Canceled Specimen: Sputum     CULTURE, FUNGUS [[id number]] Collected: 10/30/22 1730    Order Status: No result Specimen: Sputum Updated: 11/01/22 0937    CULTURE AFB AND SMEAR [[id number]] Collected: 10/28/22 0700    Order Status: Completed Specimen: Sputum Updated: 10/29/22 1436     Source SPUTUM        AFB Specimen processing Concentration     Acid Fast Smear Negative        Comment: Performed At: [facility] & [address] [id number]  Harpal Sims MD EZ:[id number]          Acid Fast Culture PENDING    CULTURE AFB AND SMEAR [[id number]] Collected: 10/27/22 1445    Order Status: Sent Specimen: Sputum Updated: 10/27/22 1506    CULTURE AFB AND SMEAR [[id number]] Collected: 10/27/22 1344    Order Status: Completed Specimen: Sputum Updated: 10/30/22 1636     Source Sputum,et suction        AFB Specimen processing Concentration     Acid Fast Smear Negative        Comment: Performed At: [facility] & [address] [id number]  Ginny Villegas MD VY:[id number]          Acid Fast Culture PENDING    QUANTIFERON-TB GOLD PLUS [[id number]] Collected: 10/24/22 1505    Order Status: Sent Specimen: Blood from Serum Updated: 10/25/22 1511    CULTURE, BLOOD [[id number]] Collected: 10/24/22 1420    Order Status: Completed Specimen: Blood Updated: 10/30/22 0949     Special Requests: No Special Requests        Culture result: No growth 6 days       CULTURE, BLOOD [[id number]] Collected: 10/24/22 1413    Order Status: Completed Specimen: Blood Updated: 10/30/22 0949     Special Requests: No Special Requests        Culture result: No growth 6 days       COVID-19 WITH INFLUENZA A/B [[id number]]     Order Status: Canceled Specimen: Nasopharyngeal     COVID-19 RAPID TEST [[id number]] Collected: 10/24/22 1355    Order Status: Completed Specimen: Nasopharyngeal Updated: 10/24/22 1455     COVID-19 rapid test Not Detected        Comment:   Rapid NAAT:  The specimen is NEGATIVE for SARS-CoV-2, the novel coronavirus associated with COVID-19. Negative results should be treated as presumptive and, if inconsistent with clinical signs and symptoms or necessary for patient management, should be tested with an alternative molecular assay. Negative results do not preclude SARS-CoV-2 infection and should not be used as the sole basis for patient management decisions. This test has been authorized by the FDA under an Emergency Use   Authorization (EUA) for use by authorized laboratories.  Fact sheet for Healthcare Providers: ConventionUpdate.co.nz Fact sheet for Patients: ConventionUpdate.co.nz   Methodology: Isothermal Nucleic Acid Amplification         INFLUENZA A & B AG (RAPID TEST) [[id number]] Collected: 10/24/22 1355    Order Status: Completed Specimen: Nasopharyngeal from Nasal washing Updated: 10/24/22 1500     Influenza A Antigen Negative        Influenza B Antigen Negative                Diagnostic Images:  CT Results  (Last 48 hours)                 10/31/22 1725  CT CHEST WO CONT Final result    Impression:  1. Stable multiple pulmonary nodules. 2. Stable adenopathy. 3. New small pleural effusions. Narrative:  INDICATION:  Lung mass        EXAM: Chest CT   CT dose reduction was achieved through use of a standardized protocol tailored   for this examination and automatic exposure control for dose modulation. CONTRAST: None. COMPARISON: Chest CTA 10/24/2022. FINDINGS:    The numerous bilateral nonspecific lung nodules are stable. Mediastinal and bihilar adenopathy is stable. There are new small pleural effusions. Adrenals are not enlarged. CT CHEST WO CONT   Final Result   1. Stable multiple pulmonary nodules. 2. Stable adenopathy. 3. New small pleural effusions.                 Current Medications:    Current Facility-Administered Medications:     vancomycin (VANCOCIN) 1,000 mg in 0.9% sodium chloride 250 mL ([id number]), 1,000 mg, IntraVENous, Q12H, Ariadne Worrell MD    [START ON 11/2/2022] Vancomycin Level Due 11/2 1300, , Other, ONCE, Suzette Worrell MD    benzonatate (TESSALON) capsule 200 mg, 200 mg, Oral, Q8H PRN, Ruthy Floyd MD, 200 mg at 10/31/22 0330    lisinopriL (PRINIVIL, ZESTRIL) tablet 40 mg, 40 mg, Oral, DAILY, Azalia Weiss NP, 40 mg at 11/01/22 2510    metFORMIN (GLUCOPHAGE) tablet 500 mg, 500 mg, Oral, BID WITH MEALS, Omelia Canal, NP, 500 mg at 11/01/22 0905    potassium chloride (K-DUR, KLOR-CON M20) SR tablet 40 mEq, 40 mEq, Oral, BID, Omelia Canal, NP, 40 mEq at 11/01/22 0905    enoxaparin (LOVENOX) injection 30 mg, 30 mg, SubCUTAneous, Q12H, Hiwot Zavala MD, 30 mg at 11/01/22 0559    piperacillin-tazobactam (ZOSYN) 3.375 g in 0.9% sodium chloride (MBP/ADV) 100 mL MBP, 3.375 g, IntraVENous, Q8H, Corina Aragon MD, Last Rate: 25 mL/hr at 11/01/22 0906, 3.375 g at 11/01/22 0906    acetylcysteine (MUCOMYST) 200 mg/mL (20 %) solution 200 mg, 200 mg, Nebulization, Q6HWA RT, Ash Kemp MD, 200 mg at 11/01/22 0734    albuterol CONCENTRATE 2.5mg/0.5 mL neb soln, 2.5 mg, Nebulization, Q6HWA RT, Ash Kemp MD, 2.5 mg at 11/01/22 0734    insulin glargine (LANTUS) injection 15 Units, 15 Units, SubCUTAneous, QHS, Manoj Rivera, [address], 15 Units at 10/31/22 2111    insulin lispro (HUMALOG) injection, , SubCUTAneous, TIDAC, [place], Manoj Reyes, [address], 5 Units at 11/01/22 1209    furosemide (LASIX) injection 40 mg, 40 mg, IntraVENous, BID, Madi Rivera PA, 40 mg at 11/01/22 5277    hydrALAZINE (APRESOLINE) 20 mg/mL injection 20 mg, 20 mg, IntraVENous, Q4H PRN, Madi Rivera PA, 20 mg at 10/31/22 0108    VANCOMYCIN INFORMATION NOTE 1 Each, 1 Each, Other, Rx Dosing/Monitoring, Corina Aragon MD    glucose chewable tablet 16 g, 4 Tablet, Oral, PRN, Corina Aragon MD    glucagon (GLUCAGEN) injection 1 mg, 1 mg, IntraMUSCular, PRN, Corina Aragon MD    dextrose 10% infusion 0-250 mL, 0-250 mL, IntraVENous, PRN, Corina Aragon MD    tamsulosin (FLOMAX) capsule 0.4 mg, 0.4 mg, Oral, QPM, Corina Aragon MD, 0.4 mg at 10/31/22 1738    guaiFENesin ER (MUCINEX) tablet 600 mg, 600 mg, Oral, Q12H, Corina Aragon MD, 600 mg at 11/01/22 0906    carvediloL (COREG) tablet 6.25 mg, 6.25 mg, Oral, BID WITH MEALS, Corina Aragon MD, 6.25 mg at 11/01/22 0905    amLODIPine (NORVASC) tablet 10 mg, 10 mg, Oral, DAILY, Corina Aragon MD, 10 mg at 11/01/22 0905    sodium chloride (NS) flush 5-40 mL, 5-40 mL, IntraVENous, Q8H, Corina Aragon MD, 10 mL at 11/01/22 0559    sodium chloride (NS) flush 5-40 mL, 5-40 mL, IntraVENous, PRN, Alexa Phillips MD    acetaminophen (TYLENOL) tablet 650 mg, 650 mg, Oral, Q4H PRN, Alexa hPillips MD    budesonide-formoteroL (SYMBICORT) 160-4.5 mcg/actuation HFA inhaler 2 Puff, 2 Puff, Inhalation, BID RT, Alexa Phillips MD, 2 Puff at 11/01/22 0749       ASSESSMENT:  58 y.o. male with history of diabetes, hypertension, and COVID 3 weeks ago, that presents to the referring facility via  emergency room complaining of cough for more than 2 weeks that was gradually worsening with shortness of breath that started 1 week ago along with fever. He reports that his symptoms worsened and he began to develop a fever/chills and lose his appetite. On evaluation in the emergency room he is found with nodular bilateral parenchymal infiltrates and a WBC of 28.4. CT of the chest revealed worsening parenchymal nodules with air bronchograms. He was admitted and started on antibiotics and IV fluids. Unable to perform the necessary treatment the patient was transferred from [facility] to [facility]. Pulmonary and ID consulted. He was started on IVF, Zosyn and vancomycin. Labs show an elevated WBC, normocytic anemia, hypokalemia, elevated glucose,elevated BNP, hypoalbuminemia, and an elevated CRP and procal. continue to follow sputum culture.       # 1 Sepsis secondary to cavitary PNA   --Continue Zosyn and Vancomycin  --Continue Mucinex and Solumedrol  --PRN supplemental O2  --CT revealed numerous bilateral lung parenchymal nodules, with largest nodule right lung apex maximally 2.8 cm with air bronchograms  --Sputum cultures pending  --Pulmonary: [place]  --ID: Finesse  --CRP 7.58<--13.0  --Procal 0.31<--0.33  --Wbc 22.2<--21.6     # 2 Hypoalbuminemia  --Administer albumin x 4 doses     # 3 Elevated BNP, CHF exacerbation?  --echo shows EF of 55 - 60%, moderate concentric hypertrophy  --Continue gentle diuresis with Lasix 40mg     # 4 Normocytic Anemia  --hgb remains stable  --continue to trend h/h  --transfuse for hgb <7     # 5 COPD  --Continue home medications     # 6 Diabetes, uncontrolled  --on insulin; 15 units Lantus plus sliding scale   --A1c 7.7  --Continue metformin 500mg bid      # 7 HTN  --BP slightly elevated  --Continue amlodipine 10mg, coreg 6.25 mg bid,   --Lisinopril 40mg  --Hydralazine added for SBP >160     # 8 Hypokalemia  --Replenish with oral Potassium  --Continue to monitor     # 9 BPH  --continue tamulosin      Full Code  Dvt Prophylaxis lovenox  GI Prophylaxis none  Discharge barriers:  -pending clinical improvement  -pending sputum cx report and improvement in infx markers        Above treatment plan reviewed and discussed with patient in detail at bedside, all questions answered. Care Plan discussed with: Patient/Family    Total time spent with patient: 30 minutes.     Jaleel Mtz NP

## 2022-11-01 NOTE — PROGRESS NOTES
Progress Note    Patient: Bernardo Monreal MRN: [id number]  SSN: [id number]    YOB: 1960  Age: 58 y.o. Sex: male      Admit Date: 10/29/2022    LOS: 3 days     Subjective:   Patient followed  for sepsis with multinodular pneumonia with cavitation. Sputum cultures are pending but two AFB smears are negative. He is afebrile with increasing WBC today but CRP and procal decreasing, currently on Vancomycin and Zosyn. Patient sitting up  on edge of bed. He was expecting to be discharged today. Wife at bedside. Objective:     Vitals:    11/01/22 0000 11/01/22 0734 11/01/22 0858 11/01/22 0952   BP:   (!) 163/79 (!) 155/81   Pulse: 79  99 94   Resp:   16 16   Temp:   98 °F (36.7 °C) 97.6 °F (36.4 °C)   SpO2:  95% 96% 95%   Weight:       Height:            Intake and Output:  Current Shift: No intake/output data recorded. Last three shifts: 10/30 1901 - 11/01 0700  In: 240 [P.O.:240]  Out: 1 [Urine:1]    Physical Exam:   Vitals and nursing note reviewed. Exam conducted with a chaperone present. Constitutional:       Appearance: He is ill-appearing  Pulmonary:      Effort: No respiratory distress. Breath sounds: Rhonchi present. No wheezing or rales. Abdominal:      General: Bowel sounds are normal.      Palpations: Abdomen is soft. Genitourinary:     Comments: No Hurtado catheter  Musculoskeletal:      Right lower leg: No edema. Left lower leg: No edema. Skin:     Findings: No rash. Neurological:      General: No focal deficit present. Mental Status: He is alert and oriented to person, place, and time.    Psychiatric:   normal behavior     Lab/Data Review:     WBC 22,200        CRP 7.58 <13.00 <18.00  Procal 0.31 <0.33 <0.44    ACE level pending    Fungitell Pending  Aspergillus galactomannan antigen Pending  Quantiferon TB Gold assay Pending    AFB smear and culture (10/27) Negative  AFB smear and culture (10/27) in process  AFB smear and culture (10/28) Negative  Sputum culture (10/30) Rare Gram negative rods  Sputum culture (10/30) in process  Sputum fungal culture (10/30) in process    CT Chest (10/31) 1. Stable multiple pulmonary nodules. 2. Stable adenopathy. 3. New small pleural effusions. Assessment:     Principal Problem:    Acute respiratory failure with hypoxia ([address] (10/24/2022)    Active Problems:    Sepsis ([address] (10/24/2022)      Hypokalemia (10/24/2022)      Cavitary pneumonia (10/24/2022)      Pneumonia (10/29/2022)    Multinodular pneumonia with cavitation, etiology unclear, sputum cultures pending, Day #3 IV Vancomycin and Zosyn  Mediastinal and hilar adenopathy  Sepsis with leukocytosis, elevated procal, CRP and ESR  Recent Covid-19 infection  5. Uncontrolled diabetes mellitus     Comment:  Patient eager to go home, but I am uncomfortable with discharge while WBC is increasing, though if there is no MRSA identified tomorrow, discharge on IV Zosyn would be reasonable. Discussed this with patient and his wife, and they are receptive. Plan:   Continue IV Vancomycin and Zosyn; discontinue Vancomycin if no MRSA identified; consult Case Management for home IV Antibiotics; if approved by his insurance, would have PICC line placed.   Follow-up blood and sputum culture for bacterial and fungal pathogens;  AFB culture sent from [facility]  In am, repeat CBC, CRP, procal  Follow-up serum fungitell, Aspergillus galactomannan antigen, ACE level         Signed By: Rosy Mcfarlane MD     November 1, 2022

## 2022-11-01 NOTE — PROGRESS NOTES
PULMONARY NOTE  [facility]    Name: Bonita Huerta MRN: [id number]   : 1960 Hospital: [facility]   Date: 2022  Admission date: 10/29/2022 Hospital Day: 4       HPI:     Hospital Problems  Date Reviewed: 10/29/2022            Codes Class Noted POA    Pneumonia ICD-10-CM: J18.9  ICD-9-CM: 260  10/29/2022 Unknown        Sepsis ([facility] [address] ICD-10-CM: A41.9  ICD-9-CM: 038.9, 995.91  10/24/2022 Yes        * (Principal) Acute respiratory failure with hypoxia ([facility] [address] ICD-10-CM: J96.01  ICD-9-CM: 518.81  10/24/2022 Yes        Hypokalemia ICD-10-CM: E87.6  ICD-9-CM: 276.8  10/24/2022 Yes        Cavitary pneumonia ICD-10-CM: J18.9, J98.4  ICD-9-CM: 786, 518.89  10/24/2022 Yes            [x] High complexity decision making was performed  [x] See my orders for details      Subjective/Initial History:     I was asked by Samantha Monreal MD to see Bonita Huerta  a 58 y.o.    male in consultation     Excerpts from admission 10/29/2022 or consult notes as follows:   26-year-old male transferred from [facility] he was complaining of a generalized weakness shortness of breath dyspnea cough he is on room air now he has COVID 19 3 weeks ago for the past 1 week his condition got worse he had a CAT scan of the chest done which shows pulmonary lung nodules pneumonia infection he was treated with antibiotics and he was transferred to [facility] he is lying in the bed alert awake he is a current smoker history of diabetes hypertension, so pulmonary consult was called      No Known Allergies     MAR reviewed and pertinent medications noted or modified as needed     Current Facility-Administered Medications   Medication    benzonatate (TESSALON) capsule 200 mg    vancomycin (VANCOCIN) 1,000 mg in 0.9% sodium chloride 250 mL ([id number])    Vancomycin trough level to be drawn on 22  at 0900 am .    lisinopriL (PRINIVIL, ZESTRIL) tablet 40 mg    metFORMIN (GLUCOPHAGE) tablet 500 mg    potassium chloride (K-DUR, KLOR-CON M20) SR tablet 40 mEq    enoxaparin (LOVENOX) injection 30 mg    piperacillin-tazobactam (ZOSYN) 3.375 g in 0.9% sodium chloride (MBP/ADV) 100 mL MBP    acetylcysteine (MUCOMYST) 200 mg/mL (20 %) solution 200 mg    albuterol CONCENTRATE 2.5mg/0.5 mL neb soln    insulin glargine (LANTUS) injection 15 Units    insulin lispro (HUMALOG) injection    furosemide (LASIX) injection 40 mg    hydrALAZINE (APRESOLINE) 20 mg/mL injection 20 mg    VANCOMYCIN INFORMATION NOTE 1 Each    glucose chewable tablet 16 g    glucagon (GLUCAGEN) injection 1 mg    dextrose 10% infusion 0-250 mL    tamsulosin (FLOMAX) capsule 0.4 mg    guaiFENesin ER (MUCINEX) tablet 600 mg    carvediloL (COREG) tablet 6.25 mg    amLODIPine (NORVASC) tablet 10 mg    sodium chloride (NS) flush 5-40 mL    sodium chloride (NS) flush 5-40 mL    acetaminophen (TYLENOL) tablet 650 mg    budesonide-formoteroL (SYMBICORT) 160-4.5 mcg/actuation HFA inhaler 2 Puff      Patient PCP: None  PMH:  has a past medical history of Diabetes ([address] and Hypertension. PSH:   has no past surgical history on file. FHX: family history includes Breast Cancer in his father. SHX:  reports that he has been smoking. He has been smoking an average of .25 packs per day. He has never used smokeless tobacco. He reports current alcohol use. He reports current drug use. Drug: Marijuana. ROS:    Review of Systems   Constitutional:  Positive for malaise/fatigue. HENT: Negative. Eyes: Negative. Respiratory:  Positive for shortness of breath. Cardiovascular:  Positive for orthopnea. Gastrointestinal: Negative. Genitourinary: Negative. Musculoskeletal: Negative. Skin: Negative. Neurological: Negative. Psychiatric/Behavioral: Negative.         Objective:     Vital Signs: Telemetry:    normal sinus rhythm Intake/Output:   Visit Vitals  BP (!) 163/79   Pulse 99   Temp 98 °F (36.7 °C)   Resp 16   Ht 5' 7\" (1.702 m)   Wt 103 kg (227 lb)   SpO2 96%   BMI 35.55 kg/m²       Temp (24hrs), Av.1 °F (36.7 °C), Min:98 °F (36.7 °C), Max:98.2 °F (36.8 °C)        O2 Device: None (Room air)         Wt Readings from Last 4 Encounters:   10/29/22 103 kg (227 lb)   10/26/22 97.8 kg (215 lb 8 oz)   21 105.7 kg (233 lb)   21 105.7 kg (233 lb)          Intake/Output Summary (Last 24 hours) at 2022 0934  Last data filed at 10/31/2022 1303  Gross per 24 hour   Intake 240 ml   Output 1 ml   Net 239 ml         Last shift:      No intake/output data recorded. Last 3 shifts: 10/30 1901 -  0700  In: 240 [P.O.:240]  Out: 1 [Urine:1]       Physical Exam:     Physical Exam  Constitutional:       Appearance: Normal appearance. HENT:      Head: Normocephalic and atraumatic. Nose: Nose normal.      Mouth/Throat:      Mouth: Mucous membranes are moist.   Eyes:      Pupils: Pupils are equal, round, and reactive to light. Cardiovascular:      Rate and Rhythm: Normal rate and regular rhythm. Pulses: Normal pulses. Heart sounds: Normal heart sounds. Pulmonary:      Effort: Pulmonary effort is normal.      Breath sounds: Normal breath sounds. Abdominal:      General: Abdomen is flat. Bowel sounds are normal.      Palpations: Abdomen is soft. Musculoskeletal:         General: Normal range of motion. Cervical back: Normal range of motion and neck supple. Skin:     General: Skin is warm. Neurological:      General: No focal deficit present. Mental Status: He is alert.    Psychiatric:         Mood and Affect: Mood normal.        Labs:    Recent Labs     22  0900 10/31/22  0809 10/30/22  0926   WBC 22.2* 21.6* 19.7*   HGB 9.7* 9.0* 8.9*   * 432* 336       Recent Labs     10/31/22  0809 10/30/22  0926 10/29/22  1315    138  --    K 3.6 2.9*  --     102  --    CO2 29 30  --    * 179*  --    BUN 11 7  --    CREA 0.93  0.99 0.70  --    CA 9.4 8.8  --    PHOS  --  2.2*  -- LAC  --   --  1.3   ALB 2.1* 1.7*  --    ALT 32 36  --        No results for input(s): PH, PCO2, PO2, HCO3, FIO2 in the last 72 hours. No results for input(s): CPK, CKNDX, TROIQ in the last 72 hours. No lab exists for component: CPKMB  No results found for: BNPP, BNP   Lab Results   Component Value Date/Time    Culture result: PENDING 10/30/2022 05:30 PM    Culture result: No growth 6 days 10/24/2022 02:20 PM    Culture result: No growth 6 days 10/24/2022 02:13 PM     Lab Results   Component Value Date/Time    TSH 1.91 10/30/2022 09:26 AM       Imaging:    CXR Results  (Last 48 hours)      None          Results from [place] encounter on 10/24/22    XR CHEST PORT    Narrative  HISTORY:  -Provided with order: Cough  -Additional: None    Technique : AP PORTABLE CHEST    Comparison : 10/06/14    FINDINGS:    HEART AND MEDIASTINUM: Borderline cardiopericardial silhouette. LUNGS AND PLEURAL SPACES: Ill-defined opacities in the right more than left mid  and lower lung zones, somewhat nodular in appearance. No pleural effusion or  pneumothorax. BONY THORAX AND SOFT TISSUES: Spinal stimulator wires again project over the  midline lower thorax. Impression  Somewhat nodular bilateral parenchymal infiltrates are present, which can  reflect infectious/inflammatory etiologies. If there is a history of malignancy,  metastatic disease is not excluded. Results from [place] encounter on 10/29/22    CT CHEST WO CONT    Narrative  INDICATION:  Lung mass    EXAM: Chest CT  CT dose reduction was achieved through use of a standardized protocol tailored  for this examination and automatic exposure control for dose modulation. CONTRAST: None. COMPARISON: Chest CTA 10/24/2022. FINDINGS:  The numerous bilateral nonspecific lung nodules are stable. Mediastinal and bihilar adenopathy is stable. There are new small pleural effusions. Adrenals are not enlarged. Impression  1.  Stable multiple pulmonary nodules. 2. Stable adenopathy. 3. New small pleural effusions. IMPRESSION:   Bilateral pneumonia  Chronic Obstructive Pulmonary Disease   Diabetes mellitus and hypertension by history  Pt is requiring Drug therapy requiring intensive monitoring for toxicity  Pt is unstable, unpredictable needing inpatient monitoring; is acutely ill and at high risk of sudden decline and decompensation with severe consequenses and continued end organ dysfunction and failure  Prognosis guarded       RECOMMENDATIONS/PLAN:     51-year-old male transferred from another facility because of abnormal CAT scan which shows bilateral lung parenchymal nodules right apical which is 2.8 cm but has air bronchogram and it in small cavitated nodule left lung  Patient started on Zosyn and vancomycin  hisCAT scan also which shows mediastinal bilateral hilar adenopathy which can be reactive or can be underlying cancer or sarcoidosis   Repeat Ct chest shows no lung mass stable lung nodules and lymph Nodes  Continue COPD management  He needs sleep study as an outpatient  Supplemental O2 to keep sats > 93%  Aspiration precautions  Labs to follow electrolytes, renal function and and blood counts  Glucose monitoring and SSI  Bronchial hygiene with respiratory therapy techniques, bronchodilators  DVT, SUP prophylaxis  Smoking cessation counseling done  Pt needs IV fluids with additives and Drug therapy requiring intensive monitoring for toxicity  Prescription drug management with home med reconciliation reviewed     10/31 patient condition much improved on room air alert awake talking continue with antibiotics awaiting for the CAT scan of the chest for further work-up, unable to produce any sputum    11/1 Patient feeling much better, denies any shortness of breath, and is able to produce any sputum. Patient also says that the Mucinex is working and helping to produce sputum.  He denies cough, unless it is with his breathing treatment. CT chest WO contrast shows: 1. Stable multiple pulmonary nodules. 2. Stable adenopathy. 3. New small pleural effusions. Repeat CT chest in 1 month, will get Pulse Ox room air and Ambulation.       Reilly Hairston MD

## 2022-11-01 NOTE — PROGRESS NOTES
Spiritual Care Assessment/Progress Note  [facility]      NAME: Brisa Gray      MRN: [id number]  AGE: 58 y.o. SEX: male  Rastafari Affiliation: Mandaeism   Language: English     11/1/2022     Total Time (in minutes): 22     Spiritual Assessment begun in [address] through conversation with:         [x]Patient        [x] Family    [] Friend(s)        Reason for Consult: Initial/Spiritual assessment, patient floor     Spiritual beliefs: (Please include comment if needed)     [x] Identifies with a gloria tradition:         [] Supported by a gloria community:            [] Claims no spiritual orientation:           [] Seeking spiritual identity:                [] Adheres to an individual form of spirituality:           [] Not able to assess:                           Identified resources for coping:      [] Prayer                               [] Music                  [] Guided Imagery     [x] Family/friends                 [x] Pet visits     [] Devotional reading                         [] Unknown     [] Other:                                            Interventions offered during this visit: (See comments for more details)    Patient Interventions: Affirmation of emotions/emotional suffering, Coping skills reviewed/reinforced, Initial/Spiritual assessment, patient floor, Normalization of emotional/spiritual concerns, Affirmation of gloria, Iconic (affirming the presence of God/Higher Power)     Family/Friend(s):  Affirmation of emotions/emotional suffering, Coping skills reviewed/reinforced, Initial Assessment     Plan of Care:     [] Support spiritual and/or cultural needs    [] Support AMD and/or advance care planning process      [] Support grieving process   [] Coordinate Rites and/or Rituals    [] Coordination with community clergy   [] No spiritual needs identified at this time   [] Detailed Plan of Care below (See Comments)  [] Make referral to Music Therapy  [] Make referral to Pet Therapy     [] Make referral to Addiction services  [] Make referral to [facility]  [] Make referral to Spiritual Care Partner  [] No future visits requested        [x] Contact Spiritual Care for further referrals     Comments:  Visited patient in [address] [place] [address] per patient request.  Patient, wife and medical staff were present during the visit. Patient shared about his medical situation, looking forward to going home and seeing his dog as well as expressed his gloria in God. Medical staff gave him update for which he did not seem satisfied and wife requested privacy to further discuss with . Provided supportive presence while listening with empathy and reflection. Affirmed his health care needs, supportive relationships and future care needs. Advised of  availability. Contact chaplains for further referrals.  Luis M Cherry M.Div.    can be reached by calling the  at [facility]  [phone]

## 2022-11-01 NOTE — PROGRESS NOTES
Bedside and Verbal shift change report given to Wm. Lacey Bingham RN (oncoming nurse) by Maine Welsh LPN (offgoing nurse). Report included the following information SBAR, Kardex, Procedure Summary, Intake/Output, MAR, Accordion, and Recent Results.

## 2022-11-01 NOTE — PROGRESS NOTES
PULMONARY NOTE  [facility]    Name: Marcos Guaman MRN: [id number]   : 1960 Hospital: [facility]   Date: 2022  Admission date: 10/29/2022 Hospital Day: 4       HPI:     Hospital Problems  Date Reviewed: 10/29/2022            Codes Class Noted POA    Pneumonia ICD-10-CM: J18.9  ICD-9-CM: 314  10/29/2022 Unknown        Sepsis ([facility] [address] ICD-10-CM: A41.9  ICD-9-CM: 038.9, 995.91  10/24/2022 Yes        * (Principal) Acute respiratory failure with hypoxia ([facility] [address] ICD-10-CM: J96.01  ICD-9-CM: 518.81  10/24/2022 Yes        Hypokalemia ICD-10-CM: E87.6  ICD-9-CM: 276.8  10/24/2022 Yes        Cavitary pneumonia ICD-10-CM: J18.9, J98.4  ICD-9-CM: 181, 518.89  10/24/2022 Yes            [x] High complexity decision making was performed  [x] See my orders for details      Subjective/Initial History:     I was asked by Bennie Bergman MD to see Marcos Guaman  a 58 y.o.    male in consultation     Excerpts from admission 10/29/2022 or consult notes as follows:   60-year-old male transferred from [facility] he was complaining of a generalized weakness shortness of breath dyspnea cough he is on room air now he has COVID 19 3 weeks ago for the past 1 week his condition got worse he had a CAT scan of the chest done which shows pulmonary lung nodules pneumonia infection he was treated with antibiotics and he was transferred to [facility] he is lying in the bed alert awake he is a current smoker history of diabetes hypertension, so pulmonary consult was called      No Known Allergies     MAR reviewed and pertinent medications noted or modified as needed     Current Facility-Administered Medications   Medication    benzonatate (TESSALON) capsule 200 mg    vancomycin (VANCOCIN) 1,000 mg in 0.9% sodium chloride 250 mL ([id number])    Vancomycin trough level to be drawn on 22  at 0900 am .    lisinopriL (PRINIVIL, ZESTRIL) tablet 40 mg    metFORMIN (GLUCOPHAGE) tablet 500 mg    potassium chloride (K-DUR, KLOR-CON M20) SR tablet 40 mEq    enoxaparin (LOVENOX) injection 30 mg    piperacillin-tazobactam (ZOSYN) 3.375 g in 0.9% sodium chloride (MBP/ADV) 100 mL MBP    acetylcysteine (MUCOMYST) 200 mg/mL (20 %) solution 200 mg    albuterol CONCENTRATE 2.5mg/0.5 mL neb soln    insulin glargine (LANTUS) injection 15 Units    insulin lispro (HUMALOG) injection    furosemide (LASIX) injection 40 mg    hydrALAZINE (APRESOLINE) 20 mg/mL injection 20 mg    VANCOMYCIN INFORMATION NOTE 1 Each    glucose chewable tablet 16 g    glucagon (GLUCAGEN) injection 1 mg    dextrose 10% infusion 0-250 mL    tamsulosin (FLOMAX) capsule 0.4 mg    guaiFENesin ER (MUCINEX) tablet 600 mg    carvediloL (COREG) tablet 6.25 mg    amLODIPine (NORVASC) tablet 10 mg    sodium chloride (NS) flush 5-40 mL    sodium chloride (NS) flush 5-40 mL    acetaminophen (TYLENOL) tablet 650 mg    budesonide-formoteroL (SYMBICORT) 160-4.5 mcg/actuation HFA inhaler 2 Puff      Patient PCP: None  PMH:  has a past medical history of Diabetes ([address] and Hypertension. PSH:   has no past surgical history on file. FHX: family history includes Breast Cancer in his father. SHX:  reports that he has been smoking. He has been smoking an average of .25 packs per day. He has never used smokeless tobacco. He reports current alcohol use. He reports current drug use. Drug: Marijuana. ROS:    Review of Systems   Constitutional:  Positive for malaise/fatigue. HENT: Negative. Eyes: Negative. Respiratory:  Positive for shortness of breath. Cardiovascular:  Positive for orthopnea. Gastrointestinal: Negative. Genitourinary: Negative. Musculoskeletal: Negative. Skin: Negative. Neurological: Negative. Psychiatric/Behavioral: Negative.         Objective:     Vital Signs: Telemetry:    normal sinus rhythm Intake/Output:   Visit Vitals  /63   Pulse 79   Temp 98.2 °F (36.8 °C)   Resp 18   Ht 5' 7\" (1.702 m) Wt 103 kg (227 lb)   SpO2 95%   BMI 35.55 kg/m²       Temp (24hrs), Av.2 °F (36.8 °C), Min:98.1 °F (36.7 °C), Max:98.2 °F (36.8 °C)        O2 Device: None (Room air)         Wt Readings from Last 4 Encounters:   10/29/22 103 kg (227 lb)   10/26/22 97.8 kg (215 lb 8 oz)   21 105.7 kg (233 lb)   21 105.7 kg (233 lb)          Intake/Output Summary (Last 24 hours) at 2022 0823  Last data filed at 10/31/2022 1303  Gross per 24 hour   Intake 240 ml   Output 1 ml   Net 239 ml         Last shift:      No intake/output data recorded. Last 3 shifts: 10/30 1901 -  0700  In: 240 [P.O.:240]  Out: 1 [Urine:1]       Physical Exam:     Physical Exam  Constitutional:       Appearance: Normal appearance. HENT:      Head: Normocephalic and atraumatic. Nose: Nose normal.      Mouth/Throat:      Mouth: Mucous membranes are moist.   Eyes:      Pupils: Pupils are equal, round, and reactive to light. Cardiovascular:      Rate and Rhythm: Normal rate and regular rhythm. Pulses: Normal pulses. Heart sounds: Normal heart sounds. Pulmonary:      Effort: Pulmonary effort is normal.      Breath sounds: Normal breath sounds. Abdominal:      General: Abdomen is flat. Bowel sounds are normal.      Palpations: Abdomen is soft. Musculoskeletal:         General: Normal range of motion. Cervical back: Normal range of motion and neck supple. Skin:     General: Skin is warm. Neurological:      General: No focal deficit present. Mental Status: He is alert.    Psychiatric:         Mood and Affect: Mood normal.        Labs:    Recent Labs     10/31/22  0809 10/30/22  09   WBC 21.6* 19.7*   HGB 9.0* 8.9*   * 336       Recent Labs     10/31/22  0809 10/30/22  0926 10/29/22  1315    138  --    K 3.6 2.9*  --     102  --    CO2 29 30  --    * 179*  --    BUN 11 7  --    CREA 0.93  0.99 0.70  --    CA 9.4 8.8  --    PHOS  --  2.2*  --    LAC  --   --  1.3   ALB 2.1* 1.7*  --    ALT 32 36  --        No results for input(s): PH, PCO2, PO2, HCO3, FIO2 in the last 72 hours. No results for input(s): CPK, CKNDX, TROIQ in the last 72 hours. No lab exists for component: CPKMB  No results found for: BNPP, BNP   Lab Results   Component Value Date/Time    Culture result: PENDING 10/30/2022 05:30 PM    Culture result: No growth 6 days 10/24/2022 02:20 PM    Culture result: No growth 6 days 10/24/2022 02:13 PM     Lab Results   Component Value Date/Time    TSH 1.91 10/30/2022 09:26 AM       Imaging:    CXR Results  (Last 48 hours)      None          Results from [place] encounter on 10/24/22    XR CHEST PORT    Narrative  HISTORY:  -Provided with order: Cough  -Additional: None    Technique : AP PORTABLE CHEST    Comparison : 10/06/14    FINDINGS:    HEART AND MEDIASTINUM: Borderline cardiopericardial silhouette. LUNGS AND PLEURAL SPACES: Ill-defined opacities in the right more than left mid  and lower lung zones, somewhat nodular in appearance. No pleural effusion or  pneumothorax. BONY THORAX AND SOFT TISSUES: Spinal stimulator wires again project over the  midline lower thorax. Impression  Somewhat nodular bilateral parenchymal infiltrates are present, which can  reflect infectious/inflammatory etiologies. If there is a history of malignancy,  metastatic disease is not excluded. Results from [place] encounter on 10/29/22    CT CHEST WO CONT    Narrative  INDICATION:  Lung mass    EXAM: Chest CT  CT dose reduction was achieved through use of a standardized protocol tailored  for this examination and automatic exposure control for dose modulation. CONTRAST: None. COMPARISON: Chest CTA 10/24/2022. FINDINGS:  The numerous bilateral nonspecific lung nodules are stable. Mediastinal and bihilar adenopathy is stable. There are new small pleural effusions. Adrenals are not enlarged. Impression  1. Stable multiple pulmonary nodules.   2. Stable adenopathy. 3. New small pleural effusions. IMPRESSION:   Bilateral pneumonia  Chronic Obstructive Pulmonary Disease   Diabetes mellitus and hypertension by history  Pt is requiring Drug therapy requiring intensive monitoring for toxicity  Pt is unstable, unpredictable needing inpatient monitoring; is acutely ill and at high risk of sudden decline and decompensation with severe consequenses and continued end organ dysfunction and failure  Prognosis guarded       RECOMMENDATIONS/PLAN:     78-year-old male transferred from another facility because of abnormal CAT scan which shows bilateral lung parenchymal nodules right apical which is 2.8 cm but has air bronchogram and it in small cavitated nodule left lung  Patient started on Zosyn and vancomycin will repeat CAT scan also which shows mediastinal bilateral hilar adenopathy which can be reactive or can be underlying cancer or sarcoidosis if no improvement will consider doing bronchoscopy  Continue COPD management  He needs sleep study as an outpatient  Supplemental O2 to keep sats > 93%  Aspiration precautions  Labs to follow electrolytes, renal function and and blood counts  Glucose monitoring and SSI  Bronchial hygiene with respiratory therapy techniques, bronchodilators  DVT, SUP prophylaxis  Smoking cessation counseling done  Pt needs IV fluids with additives and Drug therapy requiring intensive monitoring for toxicity  Prescription drug management with home med reconciliation reviewed     10/31 patient condition much improved on room air alert awake talking continue with antibiotics awaiting for the CAT scan of the chest for further work-up, unable to produce any sputum    11/1 Patient feeling much better, denies any shortness of breath, and is able to produce any sputum. Patient also says that the Mucinex is working and helping to produce sputum. He denies cough, unless it is with his breathing treatment. CT chest WO contrast shows: 1.  Stable multiple pulmonary nodules. 2. Stable adenopathy. 3. New small pleural effusions.       Adriana Becker

## 2022-11-01 NOTE — PROGRESS NOTES
Vancomycin Dosing Consult  Wicho Almaraz is a 58 y.o. male with pneumonia (CAP) from [facility] on Vancomycin for 4 days. Pharmacy was consulted by Dr. Abbie Cordon to dose and monitor Vancomycin. Today is day 3. Antibiotic regimen: Vancomycin + Zosyn    Temp (24hrs), Av °F (36.7 °C), Min:97.6 °F (36.4 °C), Max:98.2 °F (36.8 °C)    Recent Labs     22  0900 10/31/22  0809 10/30/22  0926   WBC 22.2* 21.6* 19.7*     Recent Labs     22  0900 10/31/22  0809 10/30/22  0926 10/29/22  0420 10/28/22  0400 10/27/22  0520 10/26/22  0452   CREA 1.25 0.93  0.99 0.70 0.67 0.66 0.75 0.79   BUN 18 11 7 8 7 9 8       Recent Labs     10/31/22  0809 10/30/22  0926 10/24/22  1355   PCT 0.33* 0.44* 1.63       Estimated Creatinine Clearance: 70.1 mL/min (based on SCr of 1.25 mg/dL).  ml/min  Concomitant nephrotoxic drugs: None    Cultures:   10/24 Blood [facility]): NG, final  10/24 Blood ([address]): NG, final  10/30 Sputum: rare GNR gram stain, prelim  10/30 Fungus: pending    MRSA Swab: Not ordered, patient already received first dose of vancomycin    Target range: Re-dose when level <15 mcg/mL    Recent level history (3):  Date/Time Previous Dose & Interval Measured Level (mcg/mL) Associated AUC/LAURENT   10/29 1225 1,000 mg q8h 10.1 365   10/31 08 1,250 mg q8h 20.9 658    09 1,000 mg q8h 31.4 820     Ht Readings from Last 1 Encounters:   10/29/22 170.2 cm (67\")     Wt Readings from Last 1 Encounters:   10/29/22 103 kg (227 lb)     Ideal body weight: 66.1 kg (145 lb 11.6 oz)  Adjusted ideal body weight: 80.8 kg (178 lb 3.8 oz)     Assessment/Plan:   Afebrile, leukocytosis, elevated CRP & PCT  SCr trending up - will switch to pulse dosing due to clearance issue (switch back to AUC/kinetics when SCr becomes stable)  Hold vancomycin today   Vancomycin level scheduled  1300  If level results <15 mg/mL, then dose vancomycin at 1,000 mg x 1 dose  BMP ordered daily  Antimicrobial stop date TBD

## 2022-11-02 VITALS
WEIGHT: 227 LBS | OXYGEN SATURATION: 93 % | BODY MASS INDEX: 35.63 KG/M2 | HEART RATE: 96 BPM | RESPIRATION RATE: 18 BRPM | TEMPERATURE: 98.2 F | HEIGHT: 67 IN | SYSTOLIC BLOOD PRESSURE: 173 MMHG | DIASTOLIC BLOOD PRESSURE: 76 MMHG

## 2022-11-02 LAB
1,3 BETA GLUCAN SER-MCNC: <31 PG/ML
ALBUMIN SERPL-MCNC: 2.3 G/DL (ref 3.5–5)
ALBUMIN/GLOB SERPL: 0.6 {RATIO} (ref 1.1–2.2)
ALP SERPL-CCNC: 123 U/L (ref 45–117)
ALT SERPL-CCNC: 36 U/L (ref 12–78)
ANION GAP SERPL CALC-SCNC: 6 MMOL/L (ref 5–15)
AST SERPL W P-5'-P-CCNC: 30 U/L (ref 15–37)
BASOPHILS # BLD: 0 K/UL (ref 0–0.1)
BASOPHILS NFR BLD: 0 % (ref 0–1)
BILIRUB SERPL-MCNC: 0.3 MG/DL (ref 0.2–1)
BUN SERPL-MCNC: 18 MG/DL (ref 6–20)
BUN/CREAT SERPL: 13 (ref 12–20)
CA-I BLD-MCNC: 9.3 MG/DL (ref 8.5–10.1)
CHLORIDE SERPL-SCNC: 99 MMOL/L (ref 97–108)
CO2 SERPL-SCNC: 36 MMOL/L (ref 21–32)
CREAT SERPL-MCNC: 1.37 MG/DL (ref 0.7–1.3)
CRP SERPL-MCNC: 10.8 MG/DL (ref 0–0.6)
DIFFERENTIAL METHOD BLD: ABNORMAL
EOSINOPHIL # BLD: 0.1 K/UL (ref 0–0.4)
EOSINOPHIL NFR BLD: 1 % (ref 0–7)
ERYTHROCYTE [DISTWIDTH] IN BLOOD BY AUTOMATED COUNT: 15.4 % (ref 11.5–14.5)
GLOBULIN SER CALC-MCNC: 4.1 G/DL (ref 2–4)
GLUCOSE BLD STRIP.AUTO-MCNC: 187 MG/DL (ref 65–100)
GLUCOSE BLD STRIP.AUTO-MCNC: 201 MG/DL (ref 65–100)
GLUCOSE BLD STRIP.AUTO-MCNC: 205 MG/DL (ref 65–100)
GLUCOSE SERPL-MCNC: 190 MG/DL (ref 65–100)
HCT VFR BLD AUTO: 31.2 % (ref 36.6–50.3)
HGB BLD-MCNC: 9.8 G/DL (ref 12.1–17)
IMM GRANULOCYTES # BLD AUTO: 0.1 K/UL (ref 0–0.04)
IMM GRANULOCYTES NFR BLD AUTO: 1 % (ref 0–0.5)
LYMPHOCYTES # BLD: 1.8 K/UL (ref 0.8–3.5)
LYMPHOCYTES NFR BLD: 11 % (ref 12–49)
MCH RBC QN AUTO: 25.3 PG (ref 26–34)
MCHC RBC AUTO-ENTMCNC: 31.4 G/DL (ref 30–36.5)
MCV RBC AUTO: 80.6 FL (ref 80–99)
MONOCYTES # BLD: 1.6 K/UL (ref 0–1)
MONOCYTES NFR BLD: 9 % (ref 5–13)
NEUTS SEG # BLD: 13.1 K/UL (ref 1.8–8)
NEUTS SEG NFR BLD: 78 % (ref 32–75)
NRBC # BLD: 0 K/UL (ref 0–0.01)
NRBC BLD-RTO: 0 PER 100 WBC
PERFORMED BY, TECHID: ABNORMAL
PLATELET # BLD AUTO: 528 K/UL (ref 150–400)
PMV BLD AUTO: 9.8 FL (ref 8.9–12.9)
POTASSIUM SERPL-SCNC: 3.5 MMOL/L (ref 3.5–5.1)
PROCALCITONIN SERPL-MCNC: 0.24 NG/ML
PROT SERPL-MCNC: 6.4 G/DL (ref 6.4–8.2)
RBC # BLD AUTO: 3.87 M/UL (ref 4.1–5.7)
SODIUM SERPL-SCNC: 141 MMOL/L (ref 136–145)
WBC # BLD AUTO: 16.7 K/UL (ref 4.1–11.1)

## 2022-11-02 PROCEDURE — 74011000250 HC RX REV CODE- 250: Performed by: HOSPITALIST

## 2022-11-02 PROCEDURE — 99232 SBSQ HOSP IP/OBS MODERATE 35: CPT | Performed by: INTERNAL MEDICINE

## 2022-11-02 PROCEDURE — 74011636637 HC RX REV CODE- 636/637: Performed by: LICENSED PRACTICAL NURSE

## 2022-11-02 PROCEDURE — 36415 COLL VENOUS BLD VENIPUNCTURE: CPT

## 2022-11-02 PROCEDURE — 36569 INSJ PICC 5 YR+ W/O IMAGING: CPT

## 2022-11-02 PROCEDURE — 80053 COMPREHEN METABOLIC PANEL: CPT

## 2022-11-02 PROCEDURE — 84145 PROCALCITONIN (PCT): CPT

## 2022-11-02 PROCEDURE — 36573 INSJ PICC RS&I 5 YR+: CPT | Performed by: INTERNAL MEDICINE

## 2022-11-02 PROCEDURE — 86140 C-REACTIVE PROTEIN: CPT

## 2022-11-02 PROCEDURE — 74011250636 HC RX REV CODE- 250/636: Performed by: HOSPITALIST

## 2022-11-02 PROCEDURE — 74011250636 HC RX REV CODE- 250/636: Performed by: LICENSED PRACTICAL NURSE

## 2022-11-02 PROCEDURE — 82962 GLUCOSE BLOOD TEST: CPT

## 2022-11-02 PROCEDURE — 74011250637 HC RX REV CODE- 250/637: Performed by: HOSPITALIST

## 2022-11-02 PROCEDURE — 74011250637 HC RX REV CODE- 250/637: Performed by: NURSE PRACTITIONER

## 2022-11-02 PROCEDURE — 74011000250 HC RX REV CODE- 250: Performed by: INTERNAL MEDICINE

## 2022-11-02 PROCEDURE — 94640 AIRWAY INHALATION TREATMENT: CPT

## 2022-11-02 PROCEDURE — 74011000258 HC RX REV CODE- 258: Performed by: HOSPITALIST

## 2022-11-02 PROCEDURE — 85025 COMPLETE CBC W/AUTO DIFF WBC: CPT

## 2022-11-02 RX ORDER — METFORMIN HYDROCHLORIDE 500 MG/1
500 TABLET ORAL 2 TIMES DAILY WITH MEALS
Qty: 60 TABLET | Refills: 0 | Status: SHIPPED | OUTPATIENT
Start: 2022-11-02 | End: 2022-12-02

## 2022-11-02 RX ORDER — BUDESONIDE AND FORMOTEROL FUMARATE DIHYDRATE 160; 4.5 UG/1; UG/1
2 AEROSOL RESPIRATORY (INHALATION) 2 TIMES DAILY
Qty: 10.2 G | Refills: 0 | Status: SHIPPED | OUTPATIENT
Start: 2022-11-02 | End: 2022-12-02

## 2022-11-02 RX ORDER — CARVEDILOL 12.5 MG/1
12.5 TABLET ORAL 2 TIMES DAILY WITH MEALS
Qty: 60 TABLET | Refills: 0 | Status: SHIPPED | OUTPATIENT
Start: 2022-11-02 | End: 2022-12-02

## 2022-11-02 RX ORDER — TAMSULOSIN HYDROCHLORIDE 0.4 MG/1
0.4 CAPSULE ORAL EVERY EVENING
Qty: 30 CAPSULE | Refills: 0 | Status: SHIPPED | OUTPATIENT
Start: 2022-11-02 | End: 2022-12-02

## 2022-11-02 RX ORDER — FUROSEMIDE 20 MG/1
20 TABLET ORAL DAILY
Qty: 30 TABLET | Refills: 0 | Status: SHIPPED | OUTPATIENT
Start: 2022-11-02 | End: 2022-12-02

## 2022-11-02 RX ORDER — LISINOPRIL 40 MG/1
40 TABLET ORAL DAILY
Qty: 30 TABLET | Refills: 0 | Status: SHIPPED | OUTPATIENT
Start: 2022-11-03 | End: 2022-12-03

## 2022-11-02 RX ORDER — GUAIFENESIN 600 MG/1
600 TABLET, EXTENDED RELEASE ORAL EVERY 12 HOURS
Qty: 10 TABLET | Refills: 0 | Status: SHIPPED | OUTPATIENT
Start: 2022-11-02 | End: 2022-11-07

## 2022-11-02 RX ORDER — BENZONATATE 200 MG/1
200 CAPSULE ORAL
Qty: 21 CAPSULE | Refills: 0 | Status: SHIPPED | OUTPATIENT
Start: 2022-11-02 | End: 2022-11-09

## 2022-11-02 RX ADMIN — PIPERACILLIN AND TAZOBACTAM 3.38 G: 3; .375 INJECTION, POWDER, FOR SOLUTION INTRAVENOUS at 00:17

## 2022-11-02 RX ADMIN — INSULIN LISPRO 4 UNITS: 100 INJECTION, SOLUTION INTRAVENOUS; SUBCUTANEOUS at 11:51

## 2022-11-02 RX ADMIN — FUROSEMIDE 40 MG: 10 INJECTION, SOLUTION INTRAMUSCULAR; INTRAVENOUS at 08:57

## 2022-11-02 RX ADMIN — PIPERACILLIN AND TAZOBACTAM 3.38 G: 3; .375 INJECTION, POWDER, FOR SOLUTION INTRAVENOUS at 08:56

## 2022-11-02 RX ADMIN — SODIUM CHLORIDE, PRESERVATIVE FREE 10 ML: 5 INJECTION INTRAVENOUS at 05:05

## 2022-11-02 RX ADMIN — ACETYLCYSTEINE 200 MG: 200 SOLUTION ORAL; RESPIRATORY (INHALATION) at 08:33

## 2022-11-02 RX ADMIN — INSULIN LISPRO 3 UNITS: 100 INJECTION, SOLUTION INTRAVENOUS; SUBCUTANEOUS at 08:17

## 2022-11-02 RX ADMIN — ALBUTEROL SULFATE 2.5 MG: 2.5 SOLUTION RESPIRATORY (INHALATION) at 08:33

## 2022-11-02 RX ADMIN — BUDESONIDE AND FORMOTEROL FUMARATE DIHYDRATE 2 PUFF: 160; 4.5 AEROSOL RESPIRATORY (INHALATION) at 08:33

## 2022-11-02 RX ADMIN — GUAIFENESIN 600 MG: 600 TABLET, EXTENDED RELEASE ORAL at 08:56

## 2022-11-02 RX ADMIN — CARVEDILOL 6.25 MG: 3.12 TABLET, FILM COATED ORAL at 08:11

## 2022-11-02 RX ADMIN — ACETYLCYSTEINE 200 MG: 200 SOLUTION ORAL; RESPIRATORY (INHALATION) at 13:25

## 2022-11-02 RX ADMIN — POTASSIUM CHLORIDE 40 MEQ: 1500 TABLET, EXTENDED RELEASE ORAL at 08:56

## 2022-11-02 RX ADMIN — AMLODIPINE BESYLATE 10 MG: 5 TABLET ORAL at 08:56

## 2022-11-02 RX ADMIN — LISINOPRIL 40 MG: 40 TABLET ORAL at 08:57

## 2022-11-02 RX ADMIN — PIPERACILLIN AND TAZOBACTAM 3.38 G: 3; .375 INJECTION, POWDER, FOR SOLUTION INTRAVENOUS at 16:45

## 2022-11-02 RX ADMIN — METFORMIN HYDROCHLORIDE 500 MG: 500 TABLET ORAL at 08:11

## 2022-11-02 RX ADMIN — ENOXAPARIN SODIUM 30 MG: 100 INJECTION SUBCUTANEOUS at 05:05

## 2022-11-02 RX ADMIN — ALBUTEROL SULFATE 2.5 MG: 2.5 SOLUTION RESPIRATORY (INHALATION) at 13:25

## 2022-11-02 NOTE — PROGRESS NOTES
CM spoke with patient and his wife via phone in room to discuss DCP. Patient is recc to return home with IV ABX. Patient is aware of need, agreeable for referrals to be sent for IV ABX and HH, no preference given. CM explained process to patient and wife, all questions answered at this time, referrals sent, awaiting possible acceptance. Patient will need PICC line and IV ABX signed script, ID doctor notified, he stated he will sign the script later today when he comes to the hospital.    CM continues to follow. 3:40 PM - Patient has been accepted by [facility] for IV ABX and [facility] for [facility] 11/3/2022, patient and wife notified, patient receiving teaching for IV ABX at this time. Patient's wife to provide transport.

## 2022-11-02 NOTE — PROGRESS NOTES
Discharge plan of care/case management plan validated with provider discharge order.  Discharge instruction review with PT

## 2022-11-02 NOTE — PROGRESS NOTES
PULMONARY NOTE  [facility]    Name: Faustino Villalta MRN: [id number]   : 1960 Hospital: [facility]   Date: 2022  Admission date: 10/29/2022 Hospital Day: 5       HPI:     Hospital Problems  Date Reviewed: 10/29/2022            Codes Class Noted POA    Pneumonia ICD-10-CM: J18.9  ICD-9-CM: 685  10/29/2022 Unknown        Sepsis ([facility] [address] ICD-10-CM: A41.9  ICD-9-CM: 038.9, 995.91  10/24/2022 Yes        * (Principal) Acute respiratory failure with hypoxia ([facility] [address] ICD-10-CM: J96.01  ICD-9-CM: 518.81  10/24/2022 Yes        Hypokalemia ICD-10-CM: E87.6  ICD-9-CM: 276.8  10/24/2022 Yes        Cavitary pneumonia ICD-10-CM: J18.9, J98.4  ICD-9-CM: 936, 518.89  10/24/2022 Yes          [x] High complexity decision making was performed  [x] See my orders for details      Subjective/Initial History:     I was asked by Víctor Watson MD to see Faustino Villalta  a 58 y.o.    male in consultation     Excerpts from admission 10/29/2022 or consult notes as follows:   57-year-old male transferred from [facility] he was complaining of a generalized weakness shortness of breath dyspnea cough he is on room air now he has COVID 19 3 weeks ago for the past 1 week his condition got worse he had a CAT scan of the chest done which shows pulmonary lung nodules pneumonia infection he was treated with antibiotics and he was transferred to [facility] he is lying in the bed alert awake he is a current smoker history of diabetes hypertension, so pulmonary consult was called      No Known Allergies     MAR reviewed and pertinent medications noted or modified as needed     Current Facility-Administered Medications   Medication    Vancomycin Level Due  1300    benzonatate (TESSALON) capsule 200 mg    lisinopriL (PRINIVIL, ZESTRIL) tablet 40 mg    metFORMIN (GLUCOPHAGE) tablet 500 mg    potassium chloride (K-DUR, KLOR-CON M20) SR tablet 40 mEq    enoxaparin (LOVENOX) injection 30 mg    piperacillin-tazobactam (ZOSYN) 3.375 g in 0.9% sodium chloride (MBP/ADV) 100 mL MBP    acetylcysteine (MUCOMYST) 200 mg/mL (20 %) solution 200 mg    albuterol CONCENTRATE 2.5mg/0.5 mL neb soln    insulin glargine (LANTUS) injection 15 Units    insulin lispro (HUMALOG) injection    furosemide (LASIX) injection 40 mg    hydrALAZINE (APRESOLINE) 20 mg/mL injection 20 mg    VANCOMYCIN INFORMATION NOTE 1 Each    glucose chewable tablet 16 g    glucagon (GLUCAGEN) injection 1 mg    dextrose 10% infusion 0-250 mL    tamsulosin (FLOMAX) capsule 0.4 mg    guaiFENesin ER (MUCINEX) tablet 600 mg    carvediloL (COREG) tablet 6.25 mg    amLODIPine (NORVASC) tablet 10 mg    sodium chloride (NS) flush 5-40 mL    sodium chloride (NS) flush 5-40 mL    acetaminophen (TYLENOL) tablet 650 mg    budesonide-formoteroL (SYMBICORT) 160-4.5 mcg/actuation HFA inhaler 2 Puff      Patient PCP: None  PMH:  has a past medical history of Diabetes ([address] and Hypertension. PSH:   has no past surgical history on file. FHX: family history includes Breast Cancer in his father. SHX:  reports that he has been smoking. He has been smoking an average of .25 packs per day. He has never used smokeless tobacco. He reports current alcohol use. He reports current drug use. Drug: Marijuana. ROS:    Review of Systems   Constitutional:  Positive for malaise/fatigue. HENT: Negative. Eyes: Negative. Respiratory:  Positive for shortness of breath. Cardiovascular:  Positive for orthopnea. Gastrointestinal: Negative. Genitourinary: Negative. Musculoskeletal: Negative. Skin: Negative. Neurological: Negative. Psychiatric/Behavioral: Negative.         Objective:     Vital Signs: Telemetry:    normal sinus rhythm Intake/Output:   Visit Vitals  BP (!) 169/85   Pulse 97   Temp 97.9 °F (36.6 °C)   Resp 18   Ht 5' 7\" (1.702 m)   Wt 103 kg (227 lb)   SpO2 95%   BMI 35.55 kg/m²       Temp (24hrs), Av.1 °F (36.7 °C), Min:97.6 °F (36.4 °C), Max:99.2 °F (37.3 °C)        O2 Device: None (Room air)         Wt Readings from Last 4 Encounters:   10/29/22 103 kg (227 lb)   10/26/22 97.8 kg (215 lb 8 oz)   11/30/21 105.7 kg (233 lb)   07/09/21 105.7 kg (233 lb)        No intake or output data in the 24 hours ending 11/02/22 0849      Last shift:      No intake/output data recorded. Last 3 shifts: No intake/output data recorded. Physical Exam:     Physical Exam  Constitutional:       Appearance: Normal appearance. HENT:      Head: Normocephalic and atraumatic. Nose: Nose normal.      Mouth/Throat:      Mouth: Mucous membranes are moist.   Eyes:      Pupils: Pupils are equal, round, and reactive to light. Cardiovascular:      Rate and Rhythm: Normal rate and regular rhythm. Pulses: Normal pulses. Heart sounds: Normal heart sounds. Pulmonary:      Effort: Pulmonary effort is normal.      Breath sounds: Normal breath sounds. Abdominal:      General: Abdomen is flat. Bowel sounds are normal.      Palpations: Abdomen is soft. Musculoskeletal:         General: Normal range of motion. Cervical back: Normal range of motion and neck supple. Skin:     General: Skin is warm. Neurological:      General: No focal deficit present. Mental Status: He is alert. Psychiatric:         Mood and Affect: Mood normal.        Labs:    Recent Labs     11/01/22  0900 10/31/22  0809 10/30/22  0926   WBC 22.2* 21.6* 19.7*   HGB 9.7* 9.0* 8.9*   * 432* 336       Recent Labs     11/01/22  0900 10/31/22  0809 10/30/22  0926    137 138   K 3.6 3.6 2.9*    104 102   CO2 32 29 30   * 331* 179*   BUN 18 11 7   CREA 1.25 0.93  0.99 0.70   CA 9.5 9.4 8.8   PHOS  --   --  2.2*   ALB 2.3* 2.1* 1.7*   ALT 41 32 36       No results for input(s): PH, PCO2, PO2, HCO3, FIO2 in the last 72 hours. No results for input(s): CPK, CKNDX, TROIQ in the last 72 hours.     No lab exists for component: CPKMB  No results found for: BNPP, BNP   Lab Results   Component Value Date/Time    Culture result: Light Normal respiratory russell 10/30/2022 05:30 PM    Culture result: No growth 6 days 10/24/2022 02:20 PM    Culture result: No growth 6 days 10/24/2022 02:13 PM     Lab Results   Component Value Date/Time    TSH 1.91 10/30/2022 09:26 AM       Imaging:    CXR Results  (Last 48 hours)      None          Results from [place] encounter on 10/24/22    XR CHEST PORT    Narrative  HISTORY:  -Provided with order: Cough  -Additional: None    Technique : AP PORTABLE CHEST    Comparison : 10/06/14    FINDINGS:    HEART AND MEDIASTINUM: Borderline cardiopericardial silhouette. LUNGS AND PLEURAL SPACES: Ill-defined opacities in the right more than left mid  and lower lung zones, somewhat nodular in appearance. No pleural effusion or  pneumothorax. BONY THORAX AND SOFT TISSUES: Spinal stimulator wires again project over the  midline lower thorax. Impression  Somewhat nodular bilateral parenchymal infiltrates are present, which can  reflect infectious/inflammatory etiologies. If there is a history of malignancy,  metastatic disease is not excluded. Results from [place] encounter on 10/29/22    CT CHEST WO CONT    Narrative  INDICATION:  Lung mass    EXAM: Chest CT  CT dose reduction was achieved through use of a standardized protocol tailored  for this examination and automatic exposure control for dose modulation. CONTRAST: None. COMPARISON: Chest CTA 10/24/2022. FINDINGS:  The numerous bilateral nonspecific lung nodules are stable. Mediastinal and bihilar adenopathy is stable. There are new small pleural effusions. Adrenals are not enlarged. Impression  1. Stable multiple pulmonary nodules. 2. Stable adenopathy. 3. New small pleural effusions.         IMPRESSION:   Bilateral pneumonia  Chronic Obstructive Pulmonary Disease   Diabetes mellitus and hypertension by history  Pt is requiring Drug therapy requiring intensive monitoring for toxicity  Pt is unstable, unpredictable needing inpatient monitoring; is acutely ill and at high risk of sudden decline and decompensation with severe consequenses and continued end organ dysfunction and failure  Prognosis guarded       RECOMMENDATIONS/PLAN:     28-year-old male transferred from another facility because of abnormal CAT scan which shows bilateral lung parenchymal nodules right apical which is 2.8 cm but has air bronchogram and it in small cavitated nodule left lung  Patient started on Zosyn and vancomycin  hisCAT scan also which shows mediastinal bilateral hilar adenopathy which can be reactive or can be underlying cancer or sarcoidosis   Repeat Ct chest shows no lung mass stable lung nodules and lymph Nodes  Continue COPD management  He needs sleep study as an outpatient  Supplemental O2 to keep sats > 93%  Aspiration precautions  Labs to follow electrolytes, renal function and and blood counts  Glucose monitoring and SSI  Bronchial hygiene with respiratory therapy techniques, bronchodilators  DVT, SUP prophylaxis  Smoking cessation counseling done  Pt needs IV fluids with additives and Drug therapy requiring intensive monitoring for toxicity  Prescription drug management with home med reconciliation reviewed     10/31 patient condition much improved on room air alert awake talking continue with antibiotics awaiting for the CAT scan of the chest for further work-up, unable to produce any sputum    11/1 Patient feeling much better, denies any shortness of breath, and is able to produce any sputum. Patient also says that the Mucinex is working and helping to produce sputum. He denies cough, unless it is with his breathing treatment. CT chest WO contrast shows: 1. Stable multiple pulmonary nodules. 2. Stable adenopathy. 3. New small pleural effusions. Repeat CT chest in 1 month, will get Pulse Ox room air and Ambulation.   11/2 awake on room air pulse ox room air and ambulation did not show any desaturation, On room air at rest spo2=96%. During ambulation on room air spo2 did not drop below 94%.       Luis Solorio MD

## 2022-11-02 NOTE — DISCHARGE SUMMARY
Admit date: 10/29/2022   Admitting Provider: Kev Gallegos MD    Discharge date: 11/2/2022  Discharging Provider: Sumi Moreno NP      * Admission Diagnoses: Pneumonia [J18.9]    * Discharge Diagnoses:    Hospital Problems as of 11/2/2022 Date Reviewed: 10/29/2022            Codes Class Noted - Resolved POA    Pneumonia ICD-10-CM: J18.9  ICD-9-CM: 779  10/29/2022 - Present Unknown        [facility]eps[facility] ([facility] ICD-10-CM: A41.9  ICD-9-CM: 038.9, 995.91  10/24/2022 - Present Yes        * (Principal) Acute respiratory failure with hypoxia ([facility] ICD-10-CM: J96.01  ICD-9-CM: 518.81  10/24/2022 - Present Yes        Hypokalemia ICD-10-CM: E87.6  ICD-9-CM: 276.8  10/24/2022 - Present Yes        Cavitary pneumonia ICD-10-CM: J18.9, J98.4  ICD-9-CM: 641, 518.89  10/24/2022 - Present Yes           * Hospital Course: 58 y.o. male with history of diabetes, hypertension, and COVID 3 weeks ago, that presents to the referring facility via  emergency room complaining of cough for more than 2 weeks that was gradually worsening with shortness of breath that started 1 week ago along with fever. He reports that his symptoms worsened and he began to develop a fever/chills and lose his appetite. On evaluation in the emergency room he is found with nodular bilateral parenchymal infiltrates and a WBC of 28.4. CT of the chest revealed worsening parenchymal nodules with air bronchograms. He was admitted and started on antibiotics and IV fluids. Unable to perform the necessary treatment the patient was transferred from [facility] to [facility]. Pulmonary and ID consulted. He was started on IVF, Zosyn and vancomycin. Labs show an elevated WBC, normocytic anemia, hypokalemia, elevated glucose,elevated BNP, hypoalbuminemia, and an elevated CRP and procal. Sputum culture remains negative. Picc line placed for IV zosyn x 3 weeks per ID. Follow up with ID for specialty lab results.     * Procedures:   * No surgery found *      Consults: Pulmonary/Intensive care and ID    Significant Diagnostic Studies:   Recent Results (from the past 24 hour(s))   GLUCOSE, POC    Collection Time: 11/01/22  6:08 PM   Result Value Ref Range    Glucose (POC) 220 (H) 65 - 100 mg/dL    Performed by Zane Mercedes    GLUCOSE, POC    Collection Time: 11/01/22  7:47 PM   Result Value Ref Range    Glucose (POC) 237 (H) 65 - 100 mg/dL    Performed by [address] [place] [address], POC    Collection Time: 11/02/22  7:54 AM   Result Value Ref Range    Glucose (POC) 187 (H) 65 - 100 mg/dL    Performed by Kelsey Neal(PCT)    METABOLIC PANEL, COMPREHENSIVE    Collection Time: 11/02/22 11:02 AM   Result Value Ref Range    Sodium 141 136 - 145 mmol/L    Potassium 3.5 3.5 - 5.1 mmol/L    Chloride 99 97 - 108 mmol/L    CO2 36 (H) 21 - 32 mmol/L    Anion gap 6 5 - 15 mmol/L    Glucose 190 (H) 65 - 100 mg/dL    BUN 18 6 - 20 mg/dL    Creatinine 1.37 (H) 0.70 - 1.30 mg/dL    BUN/Creatinine ratio 13 12 - 20      eGFR 58 (L) >60 ml/min/1.73m2    Calcium 9.3 8.5 - 10.1 mg/dL    Bilirubin, total 0.3 0.2 - 1.0 mg/dL    AST (SGOT) 30 15 - 37 U/L    ALT (SGPT) 36 12 - 78 U/L    Alk. phosphatase 123 (H) 45 - 117 U/L    Protein, total 6.4 6.4 - 8.2 g/dL    Albumin 2.3 (L) 3.5 - 5.0 g/dL    Globulin 4.1 (H) 2.0 - 4.0 g/dL    A-G Ratio 0.6 (L) 1.1 - 2.2     CBC WITH AUTOMATED DIFF    Collection Time: 11/02/22 11:02 AM   Result Value Ref Range    WBC 16.7 (H) 4.1 - 11.1 K/uL    RBC 3.87 (L) 4.10 - 5.70 M/uL    HGB 9.8 (L) 12.1 - 17.0 g/dL    HCT 31.2 (L) 36.6 - 50.3 %    MCV 80.6 80.0 - 99.0 FL    MCH 25.3 (L) 26.0 - 34.0 PG    MCHC 31.4 30.0 - 36.5 g/dL    RDW 15.4 (H) 11.5 - 14.5 %    PLATELET 045 (H) 726 - 400 K/uL    MPV 9.8 8.9 - 12.9 FL    NRBC 0.0 0.0  WBC    ABSOLUTE NRBC 0.00 0.00 - 0.01 K/uL    NEUTROPHILS 78 (H) 32 - 75 %    LYMPHOCYTES 11 (L) 12 - 49 %    MONOCYTES 9 5 - 13 %    EOSINOPHILS 1 0 - 7 %    BASOPHILS 0 0 - 1 %    IMMATURE GRANULOCYTES 1 (H) 0 - 0.5 %    ABS. NEUTROPHILS 13.1 (H) 1.8 - 8.0 K/UL    ABS. LYMPHOCYTES 1.8 0.8 - 3.5 K/UL    ABS. MONOCYTES 1.6 (H) 0.0 - 1.0 K/UL    ABS. EOSINOPHILS 0.1 0.0 - 0.4 K/UL    ABS. BASOPHILS 0.0 0.0 - 0.1 K/UL    ABS. IMM.  GRANS. 0.1 (H) 0.00 - 0.04 K/UL    DF AUTOMATED     PROCALCITONIN    Collection Time: 11/02/22 11:02 AM   Result Value Ref Range    Procalcitonin 0.24 (H) 0 ng/mL   C REACTIVE PROTEIN, QT    Collection Time: 11/02/22 11:02 AM   Result Value Ref Range    C-Reactive protein 10.80 (H) 0.00 - 0.60 mg/dL   GLUCOSE, POC    Collection Time: 11/02/22 11:35 AM   Result Value Ref Range    Glucose (POC) 201 (H) 65 - 100 mg/dL    Performed by Alban Torres      Results       Procedure Component Value Units Date/Time    CULTURE, SPUTUM/BRONCH/OTH [[id number]] Collected: 10/30/22 1730    Order Status: Completed Specimen: Sputum Updated: 11/02/22 0742     Special Requests: No Special Requests        GRAM STAIN Occasional WBCs seen               Rare Epithelial cells seen            Rare Gram Negative Rods        Culture result:       Light Normal respiratory russell          CULTURE, RESPIRATORY/SPUTUM/BRONCH Jose Kelp STAIN [[id number]] Collected: 10/30/22 1730    Order Status: Sent Specimen: Sputum Updated: 10/30/22 1914    CULTURE, FUNGUS [[id number]] Collected: 10/30/22 1730    Order Status: Canceled Specimen: Sputum     CULTURE, FUNGUS [[id number]] Collected: 10/30/22 1730    Order Status: Canceled Specimen: Sputum     CULTURE, FUNGUS [[id number]] Collected: 10/30/22 1730    Order Status: No result Specimen: Sputum Updated: 11/01/22 0937    CULTURE AFB AND SMEAR [[id number]] Collected: 10/28/22 0700    Order Status: Completed Specimen: Sputum Updated: 10/29/22 1436     Source SPUTUM        AFB Specimen processing Concentration     Acid Fast Smear Negative        Comment: Performed At: [facility] & [address] [id number]  Sophia Engle MD QB:[id number]          Acid Fast Culture PENDING    CULTURE AFB AND SMEAR [[id number]] Collected: 10/27/22 1445    Order Status: Sent Specimen: Sputum Updated: 10/27/22 1506    CULTURE AFB AND SMEAR [[id number]] Collected: 10/27/22 0432    Order Status: Completed Specimen: Sputum Updated: 10/30/22 1636     Source Sputum,et suction        AFB Specimen processing Concentration     Acid Fast Smear Negative        Comment: Performed At: [address] [id number]  Brina Prasad MD Q          Acid Fast Culture PENDING    QUANTIFERON-TB GOLD PLUS [[id number]] Collected: 10/24/22 1505    Order Status: Sent Specimen: Blood from Serum Updated: 10/25/22 1511    CULTURE, BLOOD [[id number]] Collected: 10/24/22 1420    Order Status: Completed Specimen: Blood Updated: 10/30/22 0949     Special Requests: No Special Requests        Culture result: No growth 6 days       CULTURE, BLOOD [[id number]] Collected: 10/24/22 1413    Order Status: Completed Specimen: Blood Updated: 10/30/22 0949     Special Requests: No Special Requests        Culture result: No growth 6 days       COVID-19 WITH INFLUENZA A/B [[id number]]     Order Status: Canceled Specimen: Nasopharyngeal     COVID-19 RAPID TEST [[id number]] Collected: 10/24/22 1355    Order Status: Completed Specimen: Nasopharyngeal Updated: 10/24/22 1455     COVID-19 rapid test Not Detected        Comment:   Rapid NAAT:  The specimen is NEGATIVE for SARS-CoV-2, the novel coronavirus associated with COVID-19. Negative results should be treated as presumptive and, if inconsistent with clinical signs and symptoms or necessary for patient management, should be tested with an alternative molecular assay. Negative results do not preclude SARS-CoV-2 infection and should not be used as the sole basis for patient management decisions. This test has been authorized by the FDA under an Emergency Use   Authorization (EUA) for use by authorized laboratories.  Fact sheet for Healthcare Providers: ConventionUpdate.co.nz Fact sheet for Patients: ConventionUpdate.co.nz   Methodology: Isothermal Nucleic Acid Amplification         INFLUENZA A & B AG (RAPID TEST) [[id number]] Collected: 10/24/22 1355    Order Status: Completed Specimen: Nasopharyngeal from Nasal washing Updated: 10/24/22 1500     Influenza A Antigen Negative        Influenza B Antigen Negative                WBC 22,200          CRP 7.58 <13.00 <18.00  Procal 0.31 <0.33 <0.44     ACE level pending     Fungitell Pending  Aspergillus galactomannan antigen Pending  Quantiferon TB Gold assay Pending     AFB smear and culture (10/27) Negative  AFB smear and culture (10/28) Negative  Sputum culture (10/30) Normal respiratory russell FINAL  Sputum culture (10/30) in process  Sputum fungal culture (10/30) in process     CT Chest (10/31) 1. Stable multiple pulmonary nodules. 2. Stable adenopathy. 3. New small pleural effusions. Discharge Exam:  Vitals and nursing note reviewed. Constitutional:       Appearance: Normal appearance. Eyes:      Extraocular Movements: Extraocular movements intact. Cardiovascular:      Rate and Rhythm: Normal rate. Pulmonary:      Effort: Pulmonary effort is normal.   Abdominal:      General: Bowel sounds are normal.   Musculoskeletal:         General: Normal range of motion. Neurological:      Mental Status: He is alert and oriented to person, place, and time. * Discharge Condition: improved  * Disposition: Home    Discharge Medications:  Current Discharge Medication List        START taking these medications    Details   benzonatate (TESSALON) 200 mg capsule Take 1 Capsule by mouth every eight (8) hours as needed for Cough for up to 7 days. Qty: 21 Capsule, Refills: 0  Start date: 11/2/2022, End date: 11/9/2022      carvediloL (COREG) 12.5 mg tablet Take 1 Tablet by mouth two (2) times daily (with meals) for 30 days.   Qty: 60 Tablet, Refills: 0  Start date: 11/2/2022, End date: 12/2/2022      guaiFENesin ER (MUCINEX) 600 mg ER tablet Take 1 Tablet by mouth every twelve (12) hours for 5 days. Qty: 10 Tablet, Refills: 0  Start date: 11/2/2022, End date: 11/7/2022      lisinopriL (PRINIVIL, ZESTRIL) 40 mg tablet Take 1 Tablet by mouth daily for 30 days. Qty: 30 Tablet, Refills: 0  Start date: 11/3/2022, End date: 12/3/2022      metFORMIN (GLUCOPHAGE) 500 mg tablet Take 1 Tablet by mouth two (2) times daily (with meals) for 30 days. Qty: 60 Tablet, Refills: 0  Start date: 11/2/2022, End date: 12/2/2022      tamsulosin (FLOMAX) 0.4 mg capsule Take 1 Capsule by mouth every evening for 30 days. Qty: 30 Capsule, Refills: 0  Start date: 11/2/2022, End date: 12/2/2022      budesonide-formoteroL (SYMBICORT) 160-4.5 mcg/actuation HFAA Take 2 Puffs by inhalation two (2) times a day for 30 days. Qty: 10.2 g, Refills: 0  Start date: 11/2/2022, End date: 12/2/2022      furosemide (Lasix) 20 mg tablet Take 1 Tablet by mouth daily for 30 days. Qty: 30 Tablet, Refills: 0  Start date: 11/2/2022, End date: 12/2/2022      piperacillin-tazobactam in D5W (ZOSYN) 3.375 G/100 ML soln IVPB 3.375 g by IntraVENous route every eight (8) hours for 21 days. Qty: 6300 mL, Refills: 0  Start date: 11/1/2022, End date: 11/22/2022           CONTINUE these medications which have NOT CHANGED    Details   amLODIPine (NORVASC) 10 mg tablet Take 1 Tablet by mouth daily. Qty: 15 Tablet, Refills: 0             * Follow-up Care/Patient Instructions: Activity: Activity as tolerated  Diet: Cardiac Diet  Wound Care: Weekly picc line dressing changes.     Follow-up Information       Follow up With Specialties Details Why Contact Info    None    None ([phone]) Patient stated that they have no PCP      Steffi Singh NP Nurse Practitioner Follow up in 1 week(s) [facility]      Caroline Lacy MD Pulmonary Disease Follow up in 2 week(s)  [address] [place] [address]      Cierra Delgado MD Infectious Disease Physician Follow up in 1 month(s)  [address] [facility] [address]  [phone]            Time spent: > 35 minutes    Signed:  Ross Lombardi NP  11/2/2022  1:53 PM

## 2022-11-02 NOTE — PROGRESS NOTES
Progress Note    Patient: Nehemiah Schreiber MRN: [id number]  SSN: [id number]    YOB: 1960  Age: 58 y.o. Sex: male      Admit Date: 10/29/2022    LOS: 4 days     Subjective:   Patient followed  for sepsis with multinodular pneumonia with cavitation. Sputum cultures grew only normal russell. He is afebrile with  decreasing WBC and CRP and procal decreasing, currently on Vancomycin and Zosyn. He has a PICC line and is being briefed by [employer]. Objective:     Vitals:    11/01/22 1945 11/01/22 2002 11/02/22 0811 11/02/22 0816   BP: (!) 148/70  (!) 169/85 (!) 169/85   Pulse: (!) 101  97 97   Resp: 18   18   Temp: 99.2 °F (37.3 °C)   97.9 °F (36.6 °C)   SpO2: 96% 95%  95%   Weight:       Height:            Intake and Output:  Current Shift: No intake/output data recorded. Last three shifts: No intake/output data recorded. Physical Exam:   Vitals and nursing note reviewed. Exam conducted with a chaperone present. Constitutional:       Appearance: He is ill-appearing  Pulmonary:      Effort: No respiratory distress. Breath sounds: Rhonchi present. No wheezing or rales. Abdominal:      General: Bowel sounds are normal.      Palpations: Abdomen is soft. Genitourinary:     Comments: No Hurtado catheter  Musculoskeletal:      Right lower leg: No edema. Left lower leg: No edema. Skin:     Findings: No rash. Neurological:      General: No focal deficit present. Mental Status: He is alert and oriented to person, place, and time.    Psychiatric:   normal behavior     Lab/Data Review:     WBC 22,200        CRP 7.58 <13.00 <18.00  Procal 0.31 <0.33 <0.44    ACE level pending    Fungitell Pending  Aspergillus galactomannan antigen Pending  Quantiferon TB Gold assay Pending    AFB smear and culture (10/27) Negative  AFB smear and culture (10/27) in process  AFB smear and culture (10/28) Negative  Sputum culture (10/30) Normal respiratory russell FINAL  Sputum culture (10/30) in process  Sputum fungal culture (10/30) in process    CT Chest (10/31) 1. Stable multiple pulmonary nodules. 2. Stable adenopathy. 3. New small pleural effusions. Assessment:     Principal Problem:    Acute respiratory failure with hypoxia ([address] (10/24/2022)    Active Problems:    Sepsis ([address] (10/24/2022)      Hypokalemia (10/24/2022)      Cavitary pneumonia (10/24/2022)      Pneumonia (10/29/2022)    Multinodular pneumonia with cavitation, etiology unclear, sputum cultures pending, Day #3 IV Vancomycin and Zosyn  Mediastinal and hilar adenopathy  Sepsis with leukocytosis, elevated procal, CRP and ESR  Recent Covid-19 infection  5. Uncontrolled diabetes mellitus     Comment:  Sputum culture grew normal russell but no MRSA identified. WBC decreasing.     Plan:   Discontinue Vancomycin  Reasonable to discharge on IV Zosyn for 3 weeks via PICC line with weekly CBC and CRP  Cleared for discharge from ID standpoint  Will follow-up serum fungitell, Aspergillus galactomannan antigen, ACE level         Signed By: Marleny Victor MD     November 2, 2022

## 2022-11-02 NOTE — PROGRESS NOTES
PICC Placement Note    PRE-PROCEDURE VERIFICATION  Correct Procedure: yes  Correct Site:  yes  Temperature: Temp: 97.9 °F (36.6 °C), Temperature Source: Temp Source: Oral  Recent Labs     11/02/22  1102   BUN 18   CREA 1.37*   *   WBC 16.7*     Allergies: Patient has no known allergies. Education materials for PICC Care given to family: yes. See Patient Education activity for further details. PICC Booklet placed on chart: yes    PROCEDURE DETAIL  A single lumen PICC line was started for antibiotic therapy. The following documentation is in addition to the PICC properties in the lines/airways flowsheet :  Lot #: [id number]  xylocaine used: yes  Mid-Arm Circumference: 37 (cm)  Internal Catheter Length: 41(cm)  Internal Catheter Total Length: 39 (cm)  Vein Selection for PICC:right cephalic  Central Line Bundle followed yes  Complication Related to Insertion: none    The placement was verified by TPS: yes. The TPS results state the tip location is on the right side and the tip overlies the lower superior vena cava.      Line is okay to use: yes    Espinoza Boston, RN

## 2022-11-03 LAB — ACE SERPL-CCNC: <5 U/L (ref 14–82)

## 2022-11-08 ENCOUNTER — HOSPITAL ENCOUNTER (OUTPATIENT)
Dept: LAB | Age: 62
Discharge: HOME | End: 2022-11-08
Payer: MEDICARE

## 2022-11-08 LAB
CRP SERPL-MCNC: 2.4 MG/DL (ref 0–0.3)
ERYTHROCYTE [DISTWIDTH] IN BLOOD BY AUTOMATED COUNT: 15.7 % (ref 11.6–14.5)
HCT VFR BLD AUTO: 31.6 % (ref 36–48)
HGB BLD-MCNC: 9.5 G/DL (ref 13–16)
MCH RBC QN AUTO: 25.7 PG (ref 24–34)
MCHC RBC AUTO-ENTMCNC: 30.1 G/DL (ref 31–37)
MCV RBC AUTO: 85.6 FL (ref 78–100)
NRBC # BLD: 0 K/UL (ref 0–0.01)
NRBC BLD-RTO: 0 PER 100 WBC
PLATELET # BLD AUTO: 448 K/UL (ref 135–420)
PMV BLD AUTO: 9.8 FL (ref 9.2–11.8)
RBC # BLD AUTO: 3.69 M/UL (ref 4.35–5.65)
WBC # BLD AUTO: 9.3 K/UL (ref 4.6–13.2)

## 2022-11-08 PROCEDURE — 86140 C-REACTIVE PROTEIN: CPT

## 2022-11-08 PROCEDURE — 85027 COMPLETE CBC AUTOMATED: CPT

## 2022-11-15 ENCOUNTER — HOSPITAL ENCOUNTER (OUTPATIENT)
Dept: LAB | Age: 62
Discharge: HOME | End: 2022-11-15
Payer: MEDICARE

## 2022-11-15 LAB
ALBUMIN SERPL-MCNC: 2.6 G/DL (ref 3.4–5)
ALBUMIN/GLOB SERPL: 0.5 {RATIO} (ref 0.8–1.7)
ALP SERPL-CCNC: 97 U/L (ref 45–117)
ALT SERPL-CCNC: 17 U/L (ref 16–61)
ANION GAP SERPL CALC-SCNC: 6 MMOL/L (ref 3–18)
AST SERPL W P-5'-P-CCNC: 11 U/L (ref 10–38)
BASOPHILS # BLD: 0.1 K/UL (ref 0–0.1)
BASOPHILS NFR BLD: 1 % (ref 0–2)
BILIRUB SERPL-MCNC: 0.2 MG/DL (ref 0.2–1)
BUN SERPL-MCNC: 20 MG/DL (ref 7–18)
BUN/CREAT SERPL: 16 (ref 12–20)
CA-I BLD-MCNC: 8.8 MG/DL (ref 8.5–10.1)
CHLORIDE SERPL-SCNC: 104 MMOL/L (ref 100–111)
CHOLEST SERPL-MCNC: 124 MG/DL
CO2 SERPL-SCNC: 29 MMOL/L (ref 21–32)
CREAT SERPL-MCNC: 1.29 MG/DL (ref 0.6–1.3)
CRP SERPL-MCNC: 3.2 MG/DL (ref 0–0.3)
DIFFERENTIAL METHOD BLD: ABNORMAL
EOSINOPHIL # BLD: 0.2 K/UL (ref 0–0.4)
EOSINOPHIL NFR BLD: 2 % (ref 0–5)
ERYTHROCYTE [DISTWIDTH] IN BLOOD BY AUTOMATED COUNT: 16.2 % (ref 11.6–14.5)
EST. AVERAGE GLUCOSE BLD GHB EST-MCNC: 183 MG/DL
GLOBULIN SER CALC-MCNC: 4.9 G/DL (ref 2–4)
GLUCOSE SERPL-MCNC: 159 MG/DL (ref 74–99)
HBA1C MFR BLD: 8 % (ref 4.2–5.6)
HCT VFR BLD AUTO: 30.9 % (ref 36–48)
HDLC SERPL-MCNC: 63 MG/DL (ref 40–60)
HDLC SERPL: 2 {RATIO} (ref 0–5)
HGB BLD-MCNC: 9.5 G/DL (ref 13–16)
IMM GRANULOCYTES # BLD AUTO: 0 K/UL (ref 0–0.04)
IMM GRANULOCYTES NFR BLD AUTO: 0 % (ref 0–0.5)
LDLC SERPL CALC-MCNC: 46.6 MG/DL (ref 0–100)
LIPID PROFILE,FLP: ABNORMAL
LYMPHOCYTES # BLD: 1.8 K/UL (ref 0.9–3.6)
LYMPHOCYTES NFR BLD: 19 % (ref 21–52)
MCH RBC QN AUTO: 26 PG (ref 24–34)
MCHC RBC AUTO-ENTMCNC: 30.7 G/DL (ref 31–37)
MCV RBC AUTO: 84.7 FL (ref 78–100)
MONOCYTES # BLD: 0.7 K/UL (ref 0.05–1.2)
MONOCYTES NFR BLD: 8 % (ref 3–10)
NEUTS SEG # BLD: 6.7 K/UL (ref 1.8–8)
NEUTS SEG NFR BLD: 70 % (ref 40–73)
NRBC # BLD: 0 K/UL (ref 0–0.01)
NRBC BLD-RTO: 0 PER 100 WBC
PLATELET # BLD AUTO: 264 K/UL (ref 135–420)
PMV BLD AUTO: 10.4 FL (ref 9.2–11.8)
POTASSIUM SERPL-SCNC: 3.8 MMOL/L (ref 3.5–5.5)
PROT SERPL-MCNC: 7.5 G/DL (ref 6.4–8.2)
PSA SERPL-MCNC: 0.7 NG/ML (ref 0–4)
RBC # BLD AUTO: 3.65 M/UL (ref 4.35–5.65)
SODIUM SERPL-SCNC: 139 MMOL/L (ref 136–145)
TRIGL SERPL-MCNC: 72 MG/DL (ref ?–150)
VLDLC SERPL CALC-MCNC: 14.4 MG/DL
WBC # BLD AUTO: 9.5 K/UL (ref 4.6–13.2)

## 2022-11-15 PROCEDURE — 80053 COMPREHEN METABOLIC PANEL: CPT

## 2022-11-15 PROCEDURE — 83036 HEMOGLOBIN GLYCOSYLATED A1C: CPT

## 2022-11-15 PROCEDURE — 85025 COMPLETE CBC W/AUTO DIFF WBC: CPT

## 2022-11-15 PROCEDURE — 84153 ASSAY OF PSA TOTAL: CPT

## 2022-11-15 PROCEDURE — 80061 LIPID PANEL: CPT

## 2022-11-15 PROCEDURE — 86140 C-REACTIVE PROTEIN: CPT

## 2022-11-21 ENCOUNTER — TELEPHONE (OUTPATIENT)
Dept: ENDOCRINOLOGY | Age: 62
End: 2022-11-21

## 2022-11-21 NOTE — TELEPHONE ENCOUNTER
Spoke to [employer]. Patient reportedly completes IV Zosyn tomorrow (11/22/22). Gave order to stop antibiotic at that time and pull PICC line.

## 2022-11-22 ENCOUNTER — HOSPITAL ENCOUNTER (OUTPATIENT)
Dept: LAB | Age: 62
Discharge: HOME | End: 2022-11-22
Payer: MEDICARE

## 2022-11-22 LAB
CRP SERPL-MCNC: 2.1 MG/DL (ref 0–0.3)
ERYTHROCYTE [DISTWIDTH] IN BLOOD BY AUTOMATED COUNT: 16.9 % (ref 11.6–14.5)
HCT VFR BLD AUTO: 31.8 % (ref 36–48)
HGB BLD-MCNC: 9.6 G/DL (ref 13–16)
MCH RBC QN AUTO: 25.7 PG (ref 24–34)
MCHC RBC AUTO-ENTMCNC: 30.2 G/DL (ref 31–37)
MCV RBC AUTO: 85 FL (ref 78–100)
NRBC # BLD: 0 K/UL (ref 0–0.01)
NRBC BLD-RTO: 0 PER 100 WBC
PLATELET # BLD AUTO: 253 K/UL (ref 135–420)
PMV BLD AUTO: 10.3 FL (ref 9.2–11.8)
RBC # BLD AUTO: 3.74 M/UL (ref 4.35–5.65)
WBC # BLD AUTO: 7.1 K/UL (ref 4.6–13.2)

## 2022-11-22 PROCEDURE — 86140 C-REACTIVE PROTEIN: CPT

## 2022-11-22 PROCEDURE — 85027 COMPLETE CBC AUTOMATED: CPT

## 2022-12-15 ENCOUNTER — OFFICE VISIT (OUTPATIENT)
Dept: INFECTIOUS DISEASES | Age: 62
End: 2022-12-15
Payer: MEDICARE

## 2022-12-15 VITALS
HEART RATE: 76 BPM | DIASTOLIC BLOOD PRESSURE: 106 MMHG | RESPIRATION RATE: 16 BRPM | TEMPERATURE: 97.7 F | OXYGEN SATURATION: 98 % | BODY MASS INDEX: 32.8 KG/M2 | HEIGHT: 67 IN | WEIGHT: 209 LBS | SYSTOLIC BLOOD PRESSURE: 192 MMHG

## 2022-12-15 DIAGNOSIS — A41.9 SEPSIS WITHOUT ACUTE ORGAN DYSFUNCTION, DUE TO UNSPECIFIED ORGANISM (HCC): Primary | ICD-10-CM

## 2022-12-15 DIAGNOSIS — R91.8 MULTIPLE PULMONARY NODULES: ICD-10-CM

## 2022-12-15 DIAGNOSIS — R59.0 MEDIASTINAL ADENOPATHY: ICD-10-CM

## 2022-12-15 RX ORDER — SILDENAFIL CITRATE 20 MG/1
20 TABLET ORAL DAILY PRN
COMMUNITY
Start: 2022-07-28

## 2022-12-15 RX ORDER — PIPERACILLIN SODIUM, TAZOBACTAM SODIUM 36; 4.5 G/152ML; G/152ML
INJECTION, POWDER, LYOPHILIZED, FOR SOLUTION INTRAVENOUS
COMMUNITY
Start: 2022-11-15

## 2022-12-15 RX ORDER — TAMSULOSIN HYDROCHLORIDE 0.4 MG/1
0.4 CAPSULE ORAL DAILY
COMMUNITY
Start: 2022-07-28

## 2022-12-15 NOTE — PROGRESS NOTES
1. Have you been to the ER, urgent care clinic since your last visit? Hospitalized since your last visit? Yes 10/27/22 Vitaly Duque      2. Have you seen or consulted any other health care providers outside of the [address] [place] [address] since your last visit? Include any pap smears or colon screening.  No  Chief Complaint   Patient presents with    Hospital Follow Up    Blood infection     sepsis     Visit Vitals  BP (!) 189/109 (BP 1 Location: Left upper arm, BP Patient Position: Sitting, BP Cuff Size: Large adult)   Pulse 76   Temp 97.7 °F (36.5 °C) (Oral)   Resp 16   Ht 5' 7\" (1.702 m)   Wt 209 lb (94.8 kg)   SpO2 98%   BMI 32.73 kg/m²     Visit Vitals  BP (!) 192/106 (BP 1 Location: Right upper arm, BP Patient Position: Sitting, BP Cuff Size: Large adult)   Pulse 76   Temp 97.7 °F (36.5 °C) (Oral)   Resp 16   Ht 5' 7\" (1.702 m)   Wt 209 lb (94.8 kg)   SpO2 98%   BMI 32.73 kg/m²

## 2022-12-15 NOTE — PROGRESS NOTES
Subjective  Grisel Mata is a 58 y.o. male. HPI   Patient followed  at [facility] last month for sepsis with multinodular pneumonia with cavitation. Sputum cultures grew only normal russell. At the time of discharge he had decreasing WBC, CRP and procal on Vancomycin and Zosyn. He was discharged on IV Zosyn for three weeks which he has completed. Since discharge AFB  and fungal cultures were all finalized as negative. He is here today for hospital follow-up. Interim period uneventful. No fever, chills, pleuritic chest pain but still has mild cough. His PICC line was removed. CT Chest 10/30/22    Review of Systems   Constitutional:  Negative for chills, diaphoresis, fever and weight loss. Respiratory:  Positive for cough. Negative for hemoptysis, sputum production, shortness of breath and wheezing. Skin:  Negative for rash. Past Medical History:   Diagnosis Date    Diabetes ([facility]     Hypertension      History reviewed. No pertinent surgical history. Objective  Physical Exam  Vitals and nursing note reviewed. Constitutional:       Appearance: He is not ill-appearing. HENT:      Head: Normocephalic and atraumatic. Nose: Nose normal.   Cardiovascular:      Rate and Rhythm: Normal rate and regular rhythm. Heart sounds: No murmur heard. Pulmonary:      Effort: Pulmonary effort is normal. No respiratory distress. Breath sounds: Normal breath sounds. No stridor. No wheezing, rhonchi or rales. Chest:      Chest wall: No tenderness. Musculoskeletal:      Right lower leg: No edema. Left lower leg: No edema. Neurological:      General: No focal deficit present. Mental Status: He is alert and oriented to person, place, and time. Psychiatric:         Mood and Affect: Mood normal.         Behavior: Behavior normal.         Thought Content:  Thought content normal.         Judgment: Judgment normal.        Assessment & Plan  Multinodular pneumonia with cavitation, etiology unclear, sputum cultures pending, Status post 3 weeks of IV Zosyn (EOT  11/22/22)  Mediastinal and hilar adenopathy  Sepsis with leukocytosis, elevated procal, CRP and ESR, resolving  Recent Covid-19 infection  5.    Uncontrolled diabetes mellitus     Comment:  There appears to be clinical resolution, however, would still favor repeat CT Chest.     Plan:   No further antibiotics recommended at this time  Repeat CBC and CRP  Repeat CT Chest w/o contrast    Mary Beth Varghese MD

## 2022-12-16 LAB
BASOPHILS # BLD AUTO: 0.1 X10E3/UL (ref 0–0.2)
BASOPHILS NFR BLD AUTO: 1 %
CRP SERPL-MCNC: 24 MG/L (ref 0–10)
EOSINOPHIL # BLD AUTO: 0.1 X10E3/UL (ref 0–0.4)
EOSINOPHIL NFR BLD AUTO: 1 %
ERYTHROCYTE [DISTWIDTH] IN BLOOD BY AUTOMATED COUNT: 14.5 % (ref 11.6–15.4)
HCT VFR BLD AUTO: 37.8 % (ref 37.5–51)
HGB BLD-MCNC: 11.8 G/DL (ref 13–17.7)
IMM GRANULOCYTES # BLD AUTO: 0 X10E3/UL (ref 0–0.1)
IMM GRANULOCYTES NFR BLD AUTO: 0 %
LYMPHOCYTES # BLD AUTO: 2.7 X10E3/UL (ref 0.7–3.1)
LYMPHOCYTES NFR BLD AUTO: 30 %
MCH RBC QN AUTO: 25.5 PG (ref 26.6–33)
MCHC RBC AUTO-ENTMCNC: 31.2 G/DL (ref 31.5–35.7)
MCV RBC AUTO: 82 FL (ref 79–97)
MONOCYTES # BLD AUTO: 0.7 X10E3/UL (ref 0.1–0.9)
MONOCYTES NFR BLD AUTO: 8 %
NEUTROPHILS # BLD AUTO: 5.5 X10E3/UL (ref 1.4–7)
NEUTROPHILS NFR BLD AUTO: 60 %
PLATELET # BLD AUTO: 310 X10E3/UL (ref 150–450)
RBC # BLD AUTO: 4.62 X10E6/UL (ref 4.14–5.8)
WBC # BLD AUTO: 9.2 X10E3/UL (ref 3.4–10.8)

## 2022-12-21 NOTE — TELEPHONE ENCOUNTER
Bety De Leon from [facility] health called in wanting to know if she can pull the patient's PICC line and if so can she have an order sent over to have it pulled.  She can be reached at [phone] Griseofulvin Counseling:  I discussed with the patient the risks of griseofulvin including but not limited to photosensitivity, cytopenia, liver damage, nausea/vomiting and severe allergy.  The patient understands that this medication is best absorbed when taken with a fatty meal (e.g., ice cream or french fries).

## 2023-04-16 ENCOUNTER — HOSPITAL ENCOUNTER (EMERGENCY)
Age: 63
Discharge: HOME | End: 2023-04-16
Attending: EMERGENCY MEDICINE
Payer: MEDICARE

## 2023-04-16 ENCOUNTER — APPOINTMENT (OUTPATIENT)
Age: 63
End: 2023-04-16
Payer: MEDICARE

## 2023-04-16 VITALS
HEIGHT: 67 IN | BODY MASS INDEX: 33.27 KG/M2 | SYSTOLIC BLOOD PRESSURE: 151 MMHG | OXYGEN SATURATION: 100 % | HEART RATE: 99 BPM | WEIGHT: 212 LBS | RESPIRATION RATE: 18 BRPM | TEMPERATURE: 99.7 F | DIASTOLIC BLOOD PRESSURE: 71 MMHG

## 2023-04-16 DIAGNOSIS — I10 ESSENTIAL HYPERTENSION: ICD-10-CM

## 2023-04-16 DIAGNOSIS — R59.0 MEDIASTINAL ADENOPATHY: ICD-10-CM

## 2023-04-16 DIAGNOSIS — E87.6 HYPOKALEMIA: ICD-10-CM

## 2023-04-16 DIAGNOSIS — R80.9 PROTEINURIA, UNSPECIFIED TYPE: ICD-10-CM

## 2023-04-16 DIAGNOSIS — R91.8 PULMONARY NODULES/LESIONS, MULTIPLE: ICD-10-CM

## 2023-04-16 DIAGNOSIS — B34.9 VIRAL SYNDROME: Primary | ICD-10-CM

## 2023-04-16 LAB
ALBUMIN SERPL-MCNC: 2.4 G/DL (ref 3.4–5)
ALBUMIN/GLOB SERPL: 0.5 (ref 0.8–1.7)
ALP SERPL-CCNC: 82 U/L (ref 45–117)
ALT SERPL-CCNC: 17 U/L (ref 16–61)
ANION GAP SERPL CALC-SCNC: 5 MMOL/L (ref 3–18)
APPEARANCE UR: CLEAR
AST SERPL W P-5'-P-CCNC: 20 U/L (ref 10–38)
BACTERIA URNS QL MICRO: ABNORMAL /HPF
BASOPHILS # BLD: 0 K/UL (ref 0–0.1)
BASOPHILS NFR BLD: 0 % (ref 0–2)
BILIRUB SERPL-MCNC: 0.1 MG/DL (ref 0.2–1)
BILIRUB UR QL: NEGATIVE
BNP SERPL-MCNC: 589 PG/ML (ref 0–900)
BUN SERPL-MCNC: 16 MG/DL (ref 7–18)
BUN/CREAT SERPL: 17 (ref 12–20)
CA-I BLD-MCNC: 9 MG/DL (ref 8.5–10.1)
CHLORIDE SERPL-SCNC: 102 MMOL/L (ref 100–111)
CO2 SERPL-SCNC: 29 MMOL/L (ref 21–32)
COLOR UR: YELLOW
CREAT SERPL-MCNC: 0.95 MG/DL (ref 0.6–1.3)
DIFFERENTIAL METHOD BLD: ABNORMAL
EOSINOPHIL # BLD: 0.1 K/UL (ref 0–0.4)
EOSINOPHIL NFR BLD: 1 % (ref 0–5)
EPITH CASTS URNS QL MICRO: ABNORMAL /LPF (ref 0–20)
ERYTHROCYTE [DISTWIDTH] IN BLOOD BY AUTOMATED COUNT: 17.2 % (ref 11.6–14.5)
FLUAV AG NPH QL IA: NEGATIVE
FLUBV AG NOSE QL IA: NEGATIVE
GLOBULIN SER CALC-MCNC: 5.2 G/DL (ref 2–4)
GLUCOSE SERPL-MCNC: 124 MG/DL (ref 74–99)
GLUCOSE UR STRIP.AUTO-MCNC: NEGATIVE MG/DL
HCT VFR BLD AUTO: 32.8 % (ref 36–48)
HGB BLD-MCNC: 9.7 G/DL (ref 13–16)
HGB UR QL STRIP: ABNORMAL
IMM GRANULOCYTES # BLD AUTO: 0.1 K/UL (ref 0–0.04)
IMM GRANULOCYTES NFR BLD AUTO: 1 % (ref 0–0.5)
KETONES UR QL STRIP.AUTO: NEGATIVE MG/DL
LACTATE SERPL-SCNC: 1.1 MMOL/L (ref 0.4–2)
LEUKOCYTE ESTERASE UR QL STRIP.AUTO: NEGATIVE
LYMPHOCYTES # BLD: 1.8 K/UL (ref 0.9–3.6)
LYMPHOCYTES NFR BLD: 19 % (ref 21–52)
MCH RBC QN AUTO: 22.2 PG (ref 24–34)
MCHC RBC AUTO-ENTMCNC: 29.6 G/DL (ref 31–37)
MCV RBC AUTO: 75.1 FL (ref 78–100)
MONOCYTES # BLD: 1.1 K/UL (ref 0.05–1.2)
MONOCYTES NFR BLD: 12 % (ref 3–10)
NEUTS SEG # BLD: 6.2 K/UL (ref 1.8–8)
NEUTS SEG NFR BLD: 67 % (ref 40–73)
NITRITE UR QL STRIP.AUTO: NEGATIVE
NRBC # BLD: 0 K/UL (ref 0–0.01)
NRBC BLD-RTO: 0 PER 100 WBC
PH UR STRIP: 6.5 (ref 5–8)
PLATELET # BLD AUTO: 369 K/UL (ref 135–420)
PMV BLD AUTO: 9 FL (ref 9.2–11.8)
POTASSIUM SERPL-SCNC: 3.1 MMOL/L (ref 3.5–5.5)
PROCALCITONIN SERPL-MCNC: <0.05 NG/ML
PROT SERPL-MCNC: 7.6 G/DL (ref 6.4–8.2)
PROT UR STRIP-MCNC: 300 MG/DL
RBC # BLD AUTO: 4.37 M/UL (ref 4.35–5.65)
RBC #/AREA URNS HPF: ABNORMAL /HPF (ref 0–2)
SARS-COV-2 RDRP RESP QL NAA+PROBE: NOT DETECTED
SODIUM SERPL-SCNC: 136 MMOL/L (ref 136–145)
SP GR UR REFRACTOMETRY: 1.02 (ref 1–1.03)
TROPONIN I SERPL HS-MCNC: 54 NG/L (ref 0–78)
UROBILINOGEN UR QL STRIP.AUTO: 1 EU/DL (ref 0.2–1)
WBC # BLD AUTO: 9.2 K/UL (ref 4.6–13.2)
WBC URNS QL MICRO: ABNORMAL /HPF (ref 0–4)

## 2023-04-16 PROCEDURE — 83605 ASSAY OF LACTIC ACID: CPT

## 2023-04-16 PROCEDURE — 84484 ASSAY OF TROPONIN QUANT: CPT

## 2023-04-16 PROCEDURE — 96365 THER/PROPH/DIAG IV INF INIT: CPT

## 2023-04-16 PROCEDURE — 87804 INFLUENZA ASSAY W/OPTIC: CPT

## 2023-04-16 PROCEDURE — 84145 PROCALCITONIN (PCT): CPT

## 2023-04-16 PROCEDURE — 87635 SARS-COV-2 COVID-19 AMP PRB: CPT

## 2023-04-16 PROCEDURE — 2580000003 HC RX 258: Performed by: EMERGENCY MEDICINE

## 2023-04-16 PROCEDURE — 71275 CT ANGIOGRAPHY CHEST: CPT

## 2023-04-16 PROCEDURE — 87040 BLOOD CULTURE FOR BACTERIA: CPT

## 2023-04-16 PROCEDURE — 86140 C-REACTIVE PROTEIN: CPT

## 2023-04-16 PROCEDURE — 6370000000 HC RX 637 (ALT 250 FOR IP): Performed by: EMERGENCY MEDICINE

## 2023-04-16 PROCEDURE — 80053 COMPREHEN METABOLIC PANEL: CPT

## 2023-04-16 PROCEDURE — 6360000004 HC RX CONTRAST MEDICATION: Performed by: EMERGENCY MEDICINE

## 2023-04-16 PROCEDURE — 83880 ASSAY OF NATRIURETIC PEPTIDE: CPT

## 2023-04-16 PROCEDURE — 81001 URINALYSIS AUTO W/SCOPE: CPT

## 2023-04-16 PROCEDURE — 85025 COMPLETE CBC W/AUTO DIFF WBC: CPT

## 2023-04-16 PROCEDURE — 94640 AIRWAY INHALATION TREATMENT: CPT

## 2023-04-16 PROCEDURE — 93005 ELECTROCARDIOGRAM TRACING: CPT | Performed by: EMERGENCY MEDICINE

## 2023-04-16 PROCEDURE — 6360000002 HC RX W HCPCS: Performed by: EMERGENCY MEDICINE

## 2023-04-16 PROCEDURE — 99285 EMERGENCY DEPT VISIT HI MDM: CPT

## 2023-04-16 PROCEDURE — 71046 X-RAY EXAM CHEST 2 VIEWS: CPT

## 2023-04-16 PROCEDURE — 96375 TX/PRO/DX INJ NEW DRUG ADDON: CPT

## 2023-04-16 RX ORDER — DOXYCYCLINE HYCLATE 100 MG
100 TABLET ORAL 2 TIMES DAILY
Qty: 20 TABLET | Refills: 0 | Status: SHIPPED | OUTPATIENT
Start: 2023-04-16 | End: 2023-04-16 | Stop reason: SDUPTHER

## 2023-04-16 RX ORDER — 0.9 % SODIUM CHLORIDE 0.9 %
1000 INTRAVENOUS SOLUTION INTRAVENOUS ONCE
Status: COMPLETED | OUTPATIENT
Start: 2023-04-16 | End: 2023-04-16

## 2023-04-16 RX ORDER — POTASSIUM CHLORIDE 20 MEQ/1
20 TABLET, EXTENDED RELEASE ORAL DAILY
Qty: 3 TABLET | Refills: 0 | Status: SHIPPED | OUTPATIENT
Start: 2023-04-17 | End: 2023-04-16 | Stop reason: SDUPTHER

## 2023-04-16 RX ORDER — IPRATROPIUM BROMIDE AND ALBUTEROL SULFATE 2.5; .5 MG/3ML; MG/3ML
1 SOLUTION RESPIRATORY (INHALATION)
Status: COMPLETED | OUTPATIENT
Start: 2023-04-16 | End: 2023-04-16

## 2023-04-16 RX ORDER — FUROSEMIDE 20 MG/1
20 TABLET ORAL DAILY
COMMUNITY
Start: 2023-02-16

## 2023-04-16 RX ORDER — LISINOPRIL 40 MG/1
40 TABLET ORAL DAILY
COMMUNITY
Start: 2023-02-16

## 2023-04-16 RX ORDER — DOXYCYCLINE HYCLATE 100 MG
100 TABLET ORAL 2 TIMES DAILY
Qty: 20 TABLET | Refills: 0 | Status: SHIPPED | OUTPATIENT
Start: 2023-04-16 | End: 2023-04-26

## 2023-04-16 RX ORDER — PREDNISONE 20 MG/1
40 TABLET ORAL DAILY
Qty: 8 TABLET | Refills: 0 | Status: SHIPPED | OUTPATIENT
Start: 2023-04-17 | End: 2023-04-16 | Stop reason: SDUPTHER

## 2023-04-16 RX ORDER — PREDNISONE 20 MG/1
40 TABLET ORAL DAILY
Qty: 8 TABLET | Refills: 0 | Status: SHIPPED | OUTPATIENT
Start: 2023-04-17 | End: 2023-04-21

## 2023-04-16 RX ORDER — METHYLPREDNISOLONE SODIUM SUCCINATE 125 MG/2ML
125 INJECTION, POWDER, LYOPHILIZED, FOR SOLUTION INTRAMUSCULAR; INTRAVENOUS
Status: COMPLETED | OUTPATIENT
Start: 2023-04-16 | End: 2023-04-16

## 2023-04-16 RX ORDER — HYDRALAZINE HYDROCHLORIDE 20 MG/ML
20 INJECTION INTRAMUSCULAR; INTRAVENOUS
Status: COMPLETED | OUTPATIENT
Start: 2023-04-16 | End: 2023-04-16

## 2023-04-16 RX ORDER — BENZONATATE 100 MG/1
100 CAPSULE ORAL 2 TIMES DAILY PRN
Qty: 10 CAPSULE | Refills: 0 | Status: SHIPPED | OUTPATIENT
Start: 2023-04-16 | End: 2023-04-16 | Stop reason: SDUPTHER

## 2023-04-16 RX ORDER — AMLODIPINE BESYLATE 10 MG/1
10 TABLET ORAL DAILY
COMMUNITY
Start: 2023-02-18

## 2023-04-16 RX ORDER — BENZONATATE 100 MG/1
100 CAPSULE ORAL 2 TIMES DAILY PRN
Qty: 10 CAPSULE | Refills: 0 | Status: SHIPPED | OUTPATIENT
Start: 2023-04-16 | End: 2023-04-21

## 2023-04-16 RX ORDER — POTASSIUM CHLORIDE 750 MG/1
40 TABLET, EXTENDED RELEASE ORAL ONCE
Status: COMPLETED | OUTPATIENT
Start: 2023-04-16 | End: 2023-04-16

## 2023-04-16 RX ORDER — CLONIDINE HYDROCHLORIDE 0.1 MG/1
0.2 TABLET ORAL
Status: COMPLETED | OUTPATIENT
Start: 2023-04-16 | End: 2023-04-16

## 2023-04-16 RX ORDER — POTASSIUM CHLORIDE 20 MEQ/1
20 TABLET, EXTENDED RELEASE ORAL DAILY
Qty: 3 TABLET | Refills: 0 | Status: SHIPPED | OUTPATIENT
Start: 2023-04-17 | End: 2023-04-20

## 2023-04-16 RX ORDER — CARVEDILOL 12.5 MG/1
12.5 TABLET ORAL 2 TIMES DAILY
COMMUNITY
Start: 2023-02-24

## 2023-04-16 RX ADMIN — SODIUM CHLORIDE 1000 ML: 9 INJECTION, SOLUTION INTRAVENOUS at 16:07

## 2023-04-16 RX ADMIN — CLONIDINE HYDROCHLORIDE 0.2 MG: 0.1 TABLET ORAL at 15:39

## 2023-04-16 RX ADMIN — HYDRALAZINE HYDROCHLORIDE 20 MG: 20 INJECTION INTRAMUSCULAR; INTRAVENOUS at 17:18

## 2023-04-16 RX ADMIN — PIPERACILLIN AND TAZOBACTAM 3375 MG: 3; .375 INJECTION, POWDER, LYOPHILIZED, FOR SOLUTION INTRAVENOUS at 16:10

## 2023-04-16 RX ADMIN — POTASSIUM CHLORIDE 40 MEQ: 750 TABLET, EXTENDED RELEASE ORAL at 15:40

## 2023-04-16 RX ADMIN — METHYLPREDNISOLONE SODIUM SUCCINATE 125 MG: 125 INJECTION, POWDER, FOR SOLUTION INTRAMUSCULAR; INTRAVENOUS at 15:40

## 2023-04-16 RX ADMIN — IOPAMIDOL 100 ML: 755 INJECTION, SOLUTION INTRAVENOUS at 15:50

## 2023-04-16 RX ADMIN — IPRATROPIUM BROMIDE AND ALBUTEROL SULFATE 1 AMPULE: .5; 3 SOLUTION RESPIRATORY (INHALATION) at 13:22

## 2023-04-16 ASSESSMENT — PAIN SCALES - GENERAL
PAINLEVEL_OUTOF10: 0
PAINLEVEL_OUTOF10: 7
PAINLEVEL_OUTOF10: 0
PAINLEVEL_OUTOF10: 7
PAINLEVEL_OUTOF10: 8
PAINLEVEL_OUTOF10: 8

## 2023-04-16 ASSESSMENT — PAIN - FUNCTIONAL ASSESSMENT
PAIN_FUNCTIONAL_ASSESSMENT: 0-10

## 2023-04-16 ASSESSMENT — PAIN DESCRIPTION - LOCATION
LOCATION: CHEST

## 2023-04-16 ASSESSMENT — ENCOUNTER SYMPTOMS
COUGH: 1
GASTROINTESTINAL NEGATIVE: 1

## 2023-04-16 ASSESSMENT — LIFESTYLE VARIABLES
HOW OFTEN DO YOU HAVE A DRINK CONTAINING ALCOHOL: MONTHLY OR LESS
HOW MANY STANDARD DRINKS CONTAINING ALCOHOL DO YOU HAVE ON A TYPICAL DAY: 1 OR 2

## 2023-04-17 LAB
CRP SERPL-MCNC: 6 MG/DL (ref 0–0.3)
EKG ATRIAL RATE: 107 BPM
EKG DIAGNOSIS: NORMAL
EKG P AXIS: 61 DEGREES
EKG P-R INTERVAL: 126 MS
EKG Q-T INTERVAL: 360 MS
EKG QRS DURATION: 76 MS
EKG QTC CALCULATION (BAZETT): 480 MS
EKG R AXIS: 23 DEGREES
EKG T AXIS: 90 DEGREES
EKG VENTRICULAR RATE: 107 BPM

## 2023-04-22 LAB
BACTERIA SPEC CULT: NORMAL
BACTERIA SPEC CULT: NORMAL
Lab: NORMAL
Lab: NORMAL

## 2023-05-30 RX ORDER — SILDENAFIL CITRATE 20 MG/1
20 TABLET ORAL DAILY PRN
COMMUNITY
Start: 2022-07-28

## 2023-05-30 RX ORDER — AMLODIPINE BESYLATE 10 MG/1
10 TABLET ORAL DAILY
COMMUNITY
Start: 2021-11-30

## 2023-05-30 RX ORDER — PIPERACILLIN SODIUM, TAZOBACTAM SODIUM 36; 4.5 G/152ML; G/152ML
INJECTION, POWDER, LYOPHILIZED, FOR SOLUTION INTRAVENOUS
COMMUNITY
Start: 2022-11-15

## 2023-05-30 RX ORDER — TAMSULOSIN HYDROCHLORIDE 0.4 MG/1
0.4 CAPSULE ORAL DAILY
COMMUNITY
Start: 2022-07-28

## 2023-08-09 ENCOUNTER — APPOINTMENT (OUTPATIENT)
Age: 63
End: 2023-08-09
Payer: MEDICARE

## 2023-08-09 ENCOUNTER — HOSPITAL ENCOUNTER (EMERGENCY)
Age: 63
Discharge: HOME | End: 2023-08-09
Attending: EMERGENCY MEDICINE
Payer: MEDICARE

## 2023-08-09 VITALS
WEIGHT: 215 LBS | BODY MASS INDEX: 33.74 KG/M2 | HEIGHT: 67 IN | RESPIRATION RATE: 16 BRPM | DIASTOLIC BLOOD PRESSURE: 95 MMHG | OXYGEN SATURATION: 97 % | SYSTOLIC BLOOD PRESSURE: 199 MMHG | HEART RATE: 81 BPM | TEMPERATURE: 98.3 F

## 2023-08-09 DIAGNOSIS — R10.9 LEFT FLANK PAIN: Primary | ICD-10-CM

## 2023-08-09 DIAGNOSIS — D64.9 ANEMIA, UNSPECIFIED TYPE: ICD-10-CM

## 2023-08-09 DIAGNOSIS — S39.012A STRAIN OF LUMBAR REGION, INITIAL ENCOUNTER: ICD-10-CM

## 2023-08-09 DIAGNOSIS — E87.6 HYPOKALEMIA: ICD-10-CM

## 2023-08-09 DIAGNOSIS — R91.8 PULMONARY NODULES: ICD-10-CM

## 2023-08-09 DIAGNOSIS — I10 ESSENTIAL HYPERTENSION: ICD-10-CM

## 2023-08-09 LAB
ALBUMIN SERPL-MCNC: 2.5 G/DL (ref 3.4–5)
ALBUMIN/GLOB SERPL: 0.5 (ref 0.8–1.7)
ALP SERPL-CCNC: 97 U/L (ref 45–117)
ALT SERPL-CCNC: 15 U/L (ref 16–61)
ANION GAP SERPL CALC-SCNC: 7 MMOL/L (ref 3–18)
APPEARANCE UR: CLEAR
AST SERPL W P-5'-P-CCNC: 13 U/L (ref 10–38)
BACTERIA URNS QL MICRO: ABNORMAL /HPF
BASOPHILS # BLD: 0 K/UL (ref 0–0.1)
BASOPHILS NFR BLD: 0 % (ref 0–2)
BILIRUB DIRECT SERPL-MCNC: <0.1 MG/DL (ref 0–0.2)
BILIRUB SERPL-MCNC: 0.1 MG/DL (ref 0.2–1)
BILIRUB UR QL: NEGATIVE
BUN SERPL-MCNC: 14 MG/DL (ref 7–18)
BUN/CREAT SERPL: 14 (ref 12–20)
CA-I BLD-MCNC: 8.9 MG/DL (ref 8.5–10.1)
CHLORIDE SERPL-SCNC: 106 MMOL/L (ref 100–111)
CO2 SERPL-SCNC: 29 MMOL/L (ref 21–32)
COLOR UR: YELLOW
CREAT SERPL-MCNC: 1.02 MG/DL (ref 0.6–1.3)
DIFFERENTIAL METHOD BLD: ABNORMAL
EOSINOPHIL # BLD: 0 K/UL (ref 0–0.4)
EOSINOPHIL NFR BLD: 0 % (ref 0–5)
EPITH CASTS URNS QL MICRO: ABNORMAL /LPF (ref 0–20)
ERYTHROCYTE [DISTWIDTH] IN BLOOD BY AUTOMATED COUNT: 18.3 % (ref 11.6–14.5)
GLOBULIN SER CALC-MCNC: 4.8 G/DL (ref 2–4)
GLUCOSE SERPL-MCNC: 194 MG/DL (ref 74–99)
GLUCOSE UR STRIP.AUTO-MCNC: 100 MG/DL
HCT VFR BLD AUTO: 31.8 % (ref 36–48)
HGB BLD-MCNC: 9 G/DL (ref 13–16)
HGB UR QL STRIP: NEGATIVE
IMM GRANULOCYTES # BLD AUTO: 0 K/UL
IMM GRANULOCYTES NFR BLD AUTO: 0 %
KETONES UR QL STRIP.AUTO: NEGATIVE MG/DL
LEUKOCYTE ESTERASE UR QL STRIP.AUTO: NEGATIVE
LIPASE SERPL-CCNC: 137 U/L (ref 73–393)
LYMPHOCYTES # BLD: 2.9 K/UL (ref 0.9–3.6)
LYMPHOCYTES NFR BLD: 26 % (ref 21–52)
MCH RBC QN AUTO: 20.8 PG (ref 24–34)
MCHC RBC AUTO-ENTMCNC: 28.3 G/DL (ref 31–37)
MCV RBC AUTO: 73.4 FL (ref 78–100)
MIXED CELL CASTS URNS QL MICRO: ABNORMAL /LPF
MONOCYTES # BLD: 0.4 K/UL (ref 0.05–1.2)
MONOCYTES NFR BLD: 4 % (ref 3–10)
NEUTS SEG # BLD: 7.7 K/UL (ref 1.8–8)
NEUTS SEG NFR BLD: 70 % (ref 40–73)
NITRITE UR QL STRIP.AUTO: NEGATIVE
NRBC # BLD: 0 K/UL (ref 0–0.01)
NRBC BLD-RTO: 0 PER 100 WBC
PH UR STRIP: 6 (ref 5–8)
PLATELET # BLD AUTO: 454 K/UL (ref 135–420)
PMV BLD AUTO: 9.3 FL (ref 9.2–11.8)
POTASSIUM SERPL-SCNC: 3.2 MMOL/L (ref 3.5–5.5)
PROT SERPL-MCNC: 7.3 G/DL (ref 6.4–8.2)
PROT UR STRIP-MCNC: >300 MG/DL
RBC # BLD AUTO: 4.33 M/UL (ref 4.35–5.65)
RBC #/AREA URNS HPF: ABNORMAL /HPF (ref 0–2)
RBC MORPH BLD: ABNORMAL
RBC MORPH BLD: ABNORMAL
SODIUM SERPL-SCNC: 142 MMOL/L (ref 136–145)
SP GR UR REFRACTOMETRY: 1.02 (ref 1–1.03)
TROPONIN I SERPL HS-MCNC: 32 NG/L (ref 0–78)
UROBILINOGEN UR QL STRIP.AUTO: 0.2 EU/DL (ref 0.2–1)
WBC # BLD AUTO: 11 K/UL (ref 4.6–13.2)
WBC URNS QL MICRO: ABNORMAL /HPF (ref 0–4)

## 2023-08-09 PROCEDURE — 6360000002 HC RX W HCPCS: Performed by: EMERGENCY MEDICINE

## 2023-08-09 PROCEDURE — 99284 EMERGENCY DEPT VISIT MOD MDM: CPT

## 2023-08-09 PROCEDURE — 84484 ASSAY OF TROPONIN QUANT: CPT

## 2023-08-09 PROCEDURE — 96374 THER/PROPH/DIAG INJ IV PUSH: CPT

## 2023-08-09 PROCEDURE — 80076 HEPATIC FUNCTION PANEL: CPT

## 2023-08-09 PROCEDURE — 74176 CT ABD & PELVIS W/O CONTRAST: CPT

## 2023-08-09 PROCEDURE — 81001 URINALYSIS AUTO W/SCOPE: CPT

## 2023-08-09 PROCEDURE — 36415 COLL VENOUS BLD VENIPUNCTURE: CPT

## 2023-08-09 PROCEDURE — 2580000003 HC RX 258: Performed by: EMERGENCY MEDICINE

## 2023-08-09 PROCEDURE — 96375 TX/PRO/DX INJ NEW DRUG ADDON: CPT

## 2023-08-09 PROCEDURE — 83690 ASSAY OF LIPASE: CPT

## 2023-08-09 PROCEDURE — 80048 BASIC METABOLIC PNL TOTAL CA: CPT

## 2023-08-09 PROCEDURE — 85025 COMPLETE CBC W/AUTO DIFF WBC: CPT

## 2023-08-09 RX ORDER — 0.9 % SODIUM CHLORIDE 0.9 %
500 INTRAVENOUS SOLUTION INTRAVENOUS ONCE
Status: COMPLETED | OUTPATIENT
Start: 2023-08-09 | End: 2023-08-09

## 2023-08-09 RX ORDER — ONDANSETRON 2 MG/ML
4 INJECTION INTRAMUSCULAR; INTRAVENOUS
Status: COMPLETED | OUTPATIENT
Start: 2023-08-09 | End: 2023-08-09

## 2023-08-09 RX ORDER — MORPHINE SULFATE 4 MG/ML
4 INJECTION, SOLUTION INTRAMUSCULAR; INTRAVENOUS
Status: COMPLETED | OUTPATIENT
Start: 2023-08-09 | End: 2023-08-09

## 2023-08-09 RX ADMIN — SODIUM CHLORIDE 500 ML: 9 INJECTION, SOLUTION INTRAVENOUS at 19:59

## 2023-08-09 RX ADMIN — MORPHINE SULFATE 4 MG: 4 INJECTION, SOLUTION INTRAMUSCULAR; INTRAVENOUS at 20:00

## 2023-08-09 RX ADMIN — ONDANSETRON 4 MG: 2 INJECTION INTRAMUSCULAR; INTRAVENOUS at 20:00

## 2023-08-09 ASSESSMENT — PAIN SCALES - GENERAL
PAINLEVEL_OUTOF10: 8
PAINLEVEL_OUTOF10: 6
PAINLEVEL_OUTOF10: 0
PAINLEVEL_OUTOF10: 4

## 2023-08-09 ASSESSMENT — PAIN DESCRIPTION - ORIENTATION
ORIENTATION: LEFT

## 2023-08-09 ASSESSMENT — LIFESTYLE VARIABLES
HOW MANY STANDARD DRINKS CONTAINING ALCOHOL DO YOU HAVE ON A TYPICAL DAY: 1 OR 2
HOW OFTEN DO YOU HAVE A DRINK CONTAINING ALCOHOL: MONTHLY OR LESS

## 2023-08-09 ASSESSMENT — PAIN DESCRIPTION - FREQUENCY
FREQUENCY: INTERMITTENT
FREQUENCY: INTERMITTENT

## 2023-08-09 ASSESSMENT — PAIN DESCRIPTION - DESCRIPTORS
DESCRIPTORS: SHARP;STABBING
DESCRIPTORS: ACHING
DESCRIPTORS: SHARP

## 2023-08-09 ASSESSMENT — PAIN DESCRIPTION - LOCATION
LOCATION: FLANK

## 2023-08-09 ASSESSMENT — PAIN - FUNCTIONAL ASSESSMENT
PAIN_FUNCTIONAL_ASSESSMENT: 0-10
PAIN_FUNCTIONAL_ASSESSMENT: 0-10

## 2023-08-09 ASSESSMENT — PAIN DESCRIPTION - PAIN TYPE: TYPE: ACUTE PAIN

## 2023-08-09 NOTE — ED TRIAGE NOTES
Patient states his left flank has been hurting him since Sunday. States he has been taking Ibuprofen for the pain with no relief. States pain is intermittent and worse with movement.

## 2023-08-09 NOTE — ED PROVIDER NOTES
[facility] EMERGENCY DEPT  EMERGENCY DEPARTMENT ENCOUNTER      Pt Name: Yuniel Eugene  MRN: [id number]  [address] [facility] [place] 1960  Date of evaluation: 8/9/2023  Provider: Luisa Kaminski MD    CHIEF COMPLAINT       Chief Complaint   Patient presents with    Flank Pain     left       HPI:  Yuniel Eugene is a 61 y.o. male who presents to the emergency department pt c/o left sided flank pain, x 3 days, constant, severe, 8/10. No cp or sob. No v/d . No rash. No injury. Hasn't taken bp meds x 3 days. No cough, no sob. HPI    Nursing Notes were reviewed. REVIEW OF SYSTEMS    (2-9 systems for level 4, 10 or more for level 5)     Review of Systems    Except as noted above the remainder of the review of systems was reviewed and negative.        PAST MEDICAL HISTORY     Past Medical History:   Diagnosis Date    Acute respiratory failure with hypoxia ([address])     COVID-19 10/2022    Diabetes mellitus ([address])     Hypertension     Hypertensive urgency     Pulmonary nodules     Sepsis ([address])          SURGICAL HISTORY       Past Surgical History:   Procedure Laterality Date    BACK SURGERY      CHOLECYSTECTOMY           CURRENT MEDICATIONS       Previous Medications    AMLODIPINE (NORVASC) 10 MG TABLET    Take 1 tablet by mouth daily    AMLODIPINE (NORVASC) 10 MG TABLET    Take 1 tablet by mouth daily    CARVEDILOL (COREG) 12.5 MG TABLET    Take 1 tablet by mouth 2 times daily    FUROSEMIDE (LASIX) 20 MG TABLET    Take 1 tablet by mouth daily    LISINOPRIL (PRINIVIL;ZESTRIL) 40 MG TABLET    Take 1 tablet by mouth daily    METFORMIN (GLUCOPHAGE) 1000 MG TABLET    Take 1 tablet by mouth 2 times daily    PIPERACILLIN-TAZOBACTAM (ZOSYN) 40.5 (36-4.5) G INJECTION    ceived the following from [facility]: Outside name: piperacillin-tazobactam (ZOSYN) 40.5 gram injection    POTASSIUM CHLORIDE (KLOR-CON M) 20 MEQ EXTENDED RELEASE TABLET    Take 1 tablet by mouth daily for 3 days    SILDENAFIL (REVATIO) 20 MG TABLET

## 2023-08-10 NOTE — DISCHARGE INSTRUCTIONS
Return for pain, fever not resolving with motrin or tylenol, shortness of breath, vomiting, decreased fluid intake, weakness, numbness, dizziness, or any change or concerns. You need close follow up for your symptoms and for further evaluation of lung nodules seen on ct, as we discussed. Take your blood pressure medicines as scheduled.

## 2023-08-10 NOTE — ED NOTES
I have reviewed discharge instructions with the patient. The patient verbalized understanding. Patient encouraged to return to ER with any other concerns or emergent care needed. Patient escorted to waiting room with steady gait and no distress noted.      [address] [place] [place], [place]  08/09/23 213

## 2023-09-18 ENCOUNTER — HOSPITAL ENCOUNTER (OUTPATIENT)
Age: 63
Discharge: HOME | End: 2023-09-21

## 2023-09-26 ENCOUNTER — HOSPITAL ENCOUNTER (EMERGENCY)
Facility: HOSPITAL | Age: 63
Discharge: HOME | End: 2023-09-26
Attending: EMERGENCY MEDICINE
Payer: MEDICARE

## 2023-09-26 ENCOUNTER — APPOINTMENT (OUTPATIENT)
Facility: HOSPITAL | Age: 63
End: 2023-09-26
Payer: MEDICARE

## 2023-09-26 VITALS
RESPIRATION RATE: 18 BRPM | DIASTOLIC BLOOD PRESSURE: 88 MMHG | OXYGEN SATURATION: 99 % | TEMPERATURE: 98.2 F | BODY MASS INDEX: 32.8 KG/M2 | WEIGHT: 209 LBS | HEART RATE: 75 BPM | SYSTOLIC BLOOD PRESSURE: 191 MMHG | HEIGHT: 67 IN

## 2023-09-26 DIAGNOSIS — R91.8 PULMONARY NODULES: ICD-10-CM

## 2023-09-26 DIAGNOSIS — R07.9 CHEST PAIN, UNSPECIFIED TYPE: Primary | ICD-10-CM

## 2023-09-26 LAB
ANION GAP SERPL CALC-SCNC: 6 MMOL/L (ref 5–15)
BASOPHILS # BLD: 0 K/UL (ref 0–0.1)
BASOPHILS NFR BLD: 0 % (ref 0–1)
BUN SERPL-MCNC: 9 MG/DL (ref 6–20)
BUN/CREAT SERPL: 10 (ref 12–20)
CA-I BLD-MCNC: 9 MG/DL (ref 8.5–10.1)
CHLORIDE SERPL-SCNC: 106 MMOL/L (ref 97–108)
CO2 SERPL-SCNC: 32 MMOL/L (ref 21–32)
CREAT SERPL-MCNC: 0.86 MG/DL (ref 0.7–1.3)
D DIMER PPP FEU-MCNC: 1.09 UG/ML(FEU)
DIFFERENTIAL METHOD BLD: ABNORMAL
EOSINOPHIL # BLD: 0.1 K/UL (ref 0–0.4)
EOSINOPHIL NFR BLD: 1 % (ref 0–7)
ERYTHROCYTE [DISTWIDTH] IN BLOOD BY AUTOMATED COUNT: 18.7 % (ref 11.5–14.5)
GLUCOSE SERPL-MCNC: 154 MG/DL (ref 65–100)
HCT VFR BLD AUTO: 29 % (ref 36.6–50.3)
HGB BLD-MCNC: 8.4 G/DL (ref 12.1–17)
IMM GRANULOCYTES # BLD AUTO: 0.1 K/UL (ref 0–0.04)
IMM GRANULOCYTES NFR BLD AUTO: 1 % (ref 0–0.5)
LIPASE SERPL-CCNC: 99 U/L (ref 73–393)
LYMPHOCYTES # BLD: 2.2 K/UL (ref 0.8–3.5)
LYMPHOCYTES NFR BLD: 16 % (ref 12–49)
MAGNESIUM SERPL-MCNC: 2.2 MG/DL (ref 1.6–2.4)
MCH RBC QN AUTO: 20.8 PG (ref 26–34)
MCHC RBC AUTO-ENTMCNC: 29 G/DL (ref 30–36.5)
MCV RBC AUTO: 72 FL (ref 80–99)
MONOCYTES # BLD: 1.5 K/UL (ref 0–1)
MONOCYTES NFR BLD: 11 % (ref 5–13)
NEUTS SEG # BLD: 9.5 K/UL (ref 1.8–8)
NEUTS SEG NFR BLD: 71 % (ref 32–75)
NRBC # BLD: 0 K/UL (ref 0–0.01)
NRBC BLD-RTO: 0 PER 100 WBC
PLATELET # BLD AUTO: 337 K/UL (ref 150–400)
PMV BLD AUTO: 8.9 FL (ref 8.9–12.9)
POTASSIUM SERPL-SCNC: 3.6 MMOL/L (ref 3.5–5.1)
RBC # BLD AUTO: 4.03 M/UL (ref 4.1–5.7)
SODIUM SERPL-SCNC: 144 MMOL/L (ref 136–145)
TROPONIN I SERPL HS-MCNC: 30 NG/L (ref 0–76)
TROPONIN I SERPL HS-MCNC: 32 NG/L (ref 0–76)
WBC # BLD AUTO: 13.3 K/UL (ref 4.1–11.1)

## 2023-09-26 PROCEDURE — 6360000004 HC RX CONTRAST MEDICATION: Performed by: EMERGENCY MEDICINE

## 2023-09-26 PROCEDURE — 6360000002 HC RX W HCPCS: Performed by: EMERGENCY MEDICINE

## 2023-09-26 PROCEDURE — 99285 EMERGENCY DEPT VISIT HI MDM: CPT

## 2023-09-26 PROCEDURE — 93005 ELECTROCARDIOGRAM TRACING: CPT | Performed by: EMERGENCY MEDICINE

## 2023-09-26 PROCEDURE — 74174 CTA ABD&PLVS W/CONTRAST: CPT

## 2023-09-26 PROCEDURE — 84484 ASSAY OF TROPONIN QUANT: CPT

## 2023-09-26 PROCEDURE — 83690 ASSAY OF LIPASE: CPT

## 2023-09-26 PROCEDURE — 85379 FIBRIN DEGRADATION QUANT: CPT

## 2023-09-26 PROCEDURE — 96375 TX/PRO/DX INJ NEW DRUG ADDON: CPT

## 2023-09-26 PROCEDURE — 80048 BASIC METABOLIC PNL TOTAL CA: CPT

## 2023-09-26 PROCEDURE — 83735 ASSAY OF MAGNESIUM: CPT

## 2023-09-26 PROCEDURE — 6370000000 HC RX 637 (ALT 250 FOR IP): Performed by: EMERGENCY MEDICINE

## 2023-09-26 PROCEDURE — 96374 THER/PROPH/DIAG INJ IV PUSH: CPT

## 2023-09-26 PROCEDURE — 71045 X-RAY EXAM CHEST 1 VIEW: CPT

## 2023-09-26 PROCEDURE — 85025 COMPLETE CBC W/AUTO DIFF WBC: CPT

## 2023-09-26 PROCEDURE — 36415 COLL VENOUS BLD VENIPUNCTURE: CPT

## 2023-09-26 RX ORDER — CLONIDINE HYDROCHLORIDE 0.1 MG/1
0.1 TABLET ORAL
Status: COMPLETED | OUTPATIENT
Start: 2023-09-26 | End: 2023-09-26

## 2023-09-26 RX ORDER — LABETALOL HYDROCHLORIDE 5 MG/ML
10 INJECTION, SOLUTION INTRAVENOUS
Status: COMPLETED | OUTPATIENT
Start: 2023-09-26 | End: 2023-09-26

## 2023-09-26 RX ORDER — KETOROLAC TROMETHAMINE 30 MG/ML
30 INJECTION, SOLUTION INTRAMUSCULAR; INTRAVENOUS
Status: COMPLETED | OUTPATIENT
Start: 2023-09-26 | End: 2023-09-26

## 2023-09-26 RX ORDER — CHLORPHENIRAMINE MALEATE AND DEXTROMETHORPHAN HYDROBROMIDE 4; 30 MG/1; MG/1
1 TABLET, FILM COATED ORAL 2 TIMES DAILY PRN
Qty: 14 TABLET | Refills: 0 | Status: SHIPPED | OUTPATIENT
Start: 2023-09-26

## 2023-09-26 RX ORDER — AMLODIPINE BESYLATE 5 MG/1
10 TABLET ORAL
Status: COMPLETED | OUTPATIENT
Start: 2023-09-26 | End: 2023-09-26

## 2023-09-26 RX ORDER — KETOROLAC TROMETHAMINE 15 MG/ML
15 INJECTION, SOLUTION INTRAMUSCULAR; INTRAVENOUS
Status: DISCONTINUED | OUTPATIENT
Start: 2023-09-26 | End: 2023-09-26

## 2023-09-26 RX ADMIN — KETOROLAC TROMETHAMINE 30 MG: 30 INJECTION, SOLUTION INTRAMUSCULAR; INTRAVENOUS at 09:45

## 2023-09-26 RX ADMIN — IOPAMIDOL 100 ML: 755 INJECTION, SOLUTION INTRAVENOUS at 10:54

## 2023-09-26 RX ADMIN — LABETALOL HYDROCHLORIDE 10 MG: 5 INJECTION INTRAVENOUS at 12:04

## 2023-09-26 RX ADMIN — CLONIDINE HYDROCHLORIDE 0.1 MG: 0.1 TABLET ORAL at 13:37

## 2023-09-26 RX ADMIN — AMLODIPINE BESYLATE 10 MG: 5 TABLET ORAL at 09:46

## 2023-09-26 ASSESSMENT — LIFESTYLE VARIABLES
HOW MANY STANDARD DRINKS CONTAINING ALCOHOL DO YOU HAVE ON A TYPICAL DAY: PATIENT DOES NOT DRINK
HOW OFTEN DO YOU HAVE A DRINK CONTAINING ALCOHOL: NEVER

## 2023-09-26 ASSESSMENT — PAIN DESCRIPTION - LOCATION
LOCATION: ABDOMEN;NECK
LOCATION: BACK;ABDOMEN;NECK
LOCATION: CHEST

## 2023-09-26 ASSESSMENT — PAIN - FUNCTIONAL ASSESSMENT
PAIN_FUNCTIONAL_ASSESSMENT: 0-10

## 2023-09-26 ASSESSMENT — PAIN DESCRIPTION - ORIENTATION
ORIENTATION: LEFT
ORIENTATION: RIGHT

## 2023-09-26 ASSESSMENT — PAIN SCALES - GENERAL
PAINLEVEL_OUTOF10: 8
PAINLEVEL_OUTOF10: 10
PAINLEVEL_OUTOF10: 6
PAINLEVEL_OUTOF10: 10

## 2023-09-26 NOTE — ED PROVIDER NOTES
[facility] EMERGENCY DEPT  EMERGENCY DEPARTMENT HISTORY AND PHYSICAL EXAM      Date: 9/26/2023  Patient Name: Trell Gorman  MRN: [id number]  [facility] [facility] 1960  Date of evaluation: 9/26/2023  Provider: Myrna Cota DO   Note Started: 9:21 AM EDT 9/26/23    HISTORY OF PRESENT ILLNESS     Chief Complaint   Patient presents with    Chest Pain       History Provided By: Patient    HPI: Trell Gorman is a 61 y.o. male with past medical history significant for the below presented to the emergency department for evaluation of 3 days of left-sided neck pain, left-sided chest pain and low back pain. Patient reports no nausea, vomiting, diarrhea, constipation. Reports compliance with his antihypertensive medication and states it is typically well controlled. Denies cough, shortness of breath, known sick contacts. PAST MEDICAL HISTORY   Past Medical History:  Past Medical History:   Diagnosis Date    Acute respiratory failure with hypoxia ([address])     COVID-19 10/2022    Diabetes mellitus ([address])     Hypertension     Hypertensive urgency     Pulmonary nodules     Sepsis ([address])        Past Surgical History:  Past Surgical History:   Procedure Laterality Date    BACK SURGERY      CHOLECYSTECTOMY         Family History:  History reviewed. No pertinent family history. Social History:  Social History     Tobacco Use    Smoking status: Every Day     Types: Cigarettes    Smokeless tobacco: Never   Substance Use Topics    Alcohol use: Yes     Comment: rare    Drug use: Yes     Types: Marijuana Dorcus Sjogren)       Allergies:  No Known Allergies    PCP: CHRIS Yoon NP    Current Meds:   No current facility-administered medications for this encounter.      Current Outpatient Medications   Medication Sig Dispense Refill    Chlorpheniramine-DM (CORICIDIN COUGH/COLD) 4-30 MG TABS Take 1 tablet by mouth 2 times daily as needed (cough) 14 tablet 0    amLODIPine (NORVASC) 10 MG tablet Take 1 tablet by mouth daily

## 2023-09-26 NOTE — DISCHARGE INSTRUCTIONS
Troponin    Collection Time: 09/26/23  9:31 AM   Result Value Ref Range    Troponin, High Sensitivity 32 0 - 76 ng/L   Magnesium    Collection Time: 09/26/23  9:31 AM   Result Value Ref Range    Magnesium 2.2 1.6 - 2.4 mg/dL   Lipase    Collection Time: 09/26/23  9:31 AM   Result Value Ref Range    Lipase 99 73 - 393 U/L   D-Dimer, Quantitative    Collection Time: 09/26/23  9:31 AM   Result Value Ref Range    D-Dimer, Quant 1.09 (H) <0.50 ug/ml(FEU)   Troponin    Collection Time: 09/26/23 11:00 AM   Result Value Ref Range    Troponin, High Sensitivity 30 0 - 76 ng/L       Radiologic Studies  CTA CHEST ABDOMEN PELVIS W CONTRAST   Final Result   Multiple pulmonary nodules are stable compared to the prior exam. Improved   interstitial infiltrate and improved lymphadenopathy compared to the prior chest   CT revealed trace bilateral pleural effusions are noted with underlying   atelectasis. No evidence of aortic dissection or aneurysm. No acute   intra-abdominal abnormality. XR CHEST PORTABLE   Final Result      Persistent but improved diffuse interstitial prominence and nodular densities. No focal infiltrate is identified.           ------------------------------------------------------------------------------------------------------------  The exam and treatment you received in the Emergency Department were for an urgent problem and are not intended as complete care. It is important that you follow-up with a doctor, nurse practitioner, or physician assistant to:  (1) confirm your diagnosis,  (2) re-evaluation of changes in your illness and treatment, and  (3) for ongoing care. Please take your discharge instructions with you when you go to your follow-up appointment. If you have any problem arranging a follow-up appointment, contact the Emergency Department.   If your symptoms become worse or you do not improve as expected and you are unable to reach your health care provider, please return to the

## 2023-09-28 LAB
EKG ATRIAL RATE: 84 BPM
EKG DIAGNOSIS: NORMAL
EKG P AXIS: 78 DEGREES
EKG P-R INTERVAL: 132 MS
EKG Q-T INTERVAL: 410 MS
EKG QRS DURATION: 80 MS
EKG QTC CALCULATION (BAZETT): 484 MS
EKG R AXIS: 54 DEGREES
EKG T AXIS: 65 DEGREES
EKG VENTRICULAR RATE: 84 BPM

## 2023-10-18 ENCOUNTER — HOSPITAL ENCOUNTER (INPATIENT)
Age: 63
LOS: 1 days | Discharge: HOME | DRG: 812 | End: 2023-10-19
Attending: EMERGENCY MEDICINE | Admitting: INTERNAL MEDICINE
Payer: MEDICARE

## 2023-10-18 ENCOUNTER — ANESTHESIA (OUTPATIENT)
Age: 63
End: 2023-10-18
Payer: MEDICARE

## 2023-10-18 ENCOUNTER — TELEPHONE (OUTPATIENT)
Age: 63
End: 2023-10-18

## 2023-10-18 ENCOUNTER — ANESTHESIA EVENT (OUTPATIENT)
Age: 63
End: 2023-10-18
Payer: MEDICARE

## 2023-10-18 ENCOUNTER — APPOINTMENT (OUTPATIENT)
Age: 63
DRG: 812 | End: 2023-10-18
Payer: MEDICARE

## 2023-10-18 DIAGNOSIS — R10.32 LEFT LOWER QUADRANT ABDOMINAL PAIN: ICD-10-CM

## 2023-10-18 DIAGNOSIS — D64.9 SYMPTOMATIC ANEMIA: Primary | ICD-10-CM

## 2023-10-18 PROBLEM — R10.12 LEFT UPPER QUADRANT ABDOMINAL PAIN: Status: ACTIVE | Noted: 2023-10-18

## 2023-10-18 PROBLEM — K29.80 DUODENITIS: Status: ACTIVE | Noted: 2023-10-18

## 2023-10-18 PROBLEM — K92.2 GI BLEED: Status: ACTIVE | Noted: 2023-10-18

## 2023-10-18 PROBLEM — I10 PRIMARY HYPERTENSION: Status: ACTIVE | Noted: 2022-10-24

## 2023-10-18 PROBLEM — E11.9 DIABETES (HCC): Status: ACTIVE | Noted: 2023-10-18

## 2023-10-18 PROBLEM — K29.61 EROSIVE GASTRITIS WITH HEMORRHAGE: Status: ACTIVE | Noted: 2023-10-18

## 2023-10-18 PROBLEM — K26.9 MULTIPLE DUODENAL ULCERS: Status: ACTIVE | Noted: 2023-10-18

## 2023-10-18 LAB
ALBUMIN SERPL-MCNC: 2.5 G/DL (ref 3.4–5)
ALBUMIN/GLOB SERPL: 0.5 (ref 0.8–1.7)
ALP SERPL-CCNC: 120 U/L (ref 45–117)
ALT SERPL-CCNC: 18 U/L (ref 16–61)
ANION GAP SERPL CALC-SCNC: 4 MMOL/L (ref 3–18)
ANION GAP SERPL CALC-SCNC: 7 MMOL/L (ref 3–18)
APPEARANCE UR: CLEAR
APTT PPP: 31.5 SEC (ref 23–36.4)
AST SERPL W P-5'-P-CCNC: 17 U/L (ref 10–38)
BACTERIA URNS QL MICRO: NEGATIVE /HPF
BASOPHILS # BLD: 0.1 K/UL (ref 0–0.1)
BASOPHILS NFR BLD: 1 % (ref 0–2)
BILIRUB SERPL-MCNC: 0.2 MG/DL (ref 0.2–1)
BILIRUB UR QL: NEGATIVE
BUN SERPL-MCNC: 18 MG/DL (ref 7–18)
BUN SERPL-MCNC: 19 MG/DL (ref 7–18)
BUN/CREAT SERPL: 22 (ref 12–20)
BUN/CREAT SERPL: 22 (ref 12–20)
CA-I BLD-MCNC: 8 MG/DL (ref 8.5–10.1)
CA-I BLD-MCNC: 8.7 MG/DL (ref 8.5–10.1)
CHLORIDE SERPL-SCNC: 107 MMOL/L (ref 100–111)
CHLORIDE SERPL-SCNC: 110 MMOL/L (ref 100–111)
CO2 SERPL-SCNC: 27 MMOL/L (ref 21–32)
CO2 SERPL-SCNC: 28 MMOL/L (ref 21–32)
COLLECT DATE STL: NORMAL
COLOR UR: YELLOW
CREAT SERPL-MCNC: 0.83 MG/DL (ref 0.6–1.3)
CREAT SERPL-MCNC: 0.86 MG/DL (ref 0.6–1.3)
DIFFERENTIAL METHOD BLD: ABNORMAL
EOSINOPHIL # BLD: 0.1 K/UL (ref 0–0.4)
EOSINOPHIL NFR BLD: 1 % (ref 0–5)
EPITH CASTS URNS QL MICRO: ABNORMAL /LPF (ref 0–20)
ERYTHROCYTE [DISTWIDTH] IN BLOOD BY AUTOMATED COUNT: 18.2 % (ref 11.6–14.5)
FERRITIN SERPL-MCNC: 11 NG/ML (ref 8–388)
GLOBULIN SER CALC-MCNC: 4.8 G/DL (ref 2–4)
GLUCOSE BLD STRIP.AUTO-MCNC: 131 MG/DL (ref 70–110)
GLUCOSE BLD STRIP.AUTO-MCNC: 163 MG/DL (ref 70–110)
GLUCOSE BLD STRIP.AUTO-MCNC: 177 MG/DL (ref 70–110)
GLUCOSE BLD STRIP.AUTO-MCNC: 180 MG/DL (ref 70–110)
GLUCOSE SERPL-MCNC: 145 MG/DL (ref 74–99)
GLUCOSE SERPL-MCNC: 196 MG/DL (ref 74–99)
GLUCOSE UR STRIP.AUTO-MCNC: NEGATIVE MG/DL
HCT VFR BLD AUTO: 21.3 % (ref 36–48)
HCT VFR BLD AUTO: 21.7 % (ref 36–48)
HCT VFR BLD AUTO: 25.6 % (ref 36–48)
HEMOCCULT SP1 STL QL: NEGATIVE
HGB BLD-MCNC: 5.8 G/DL (ref 13–16)
HGB BLD-MCNC: 6.2 G/DL (ref 13–16)
HGB BLD-MCNC: 6.2 G/DL (ref 13–16)
HGB BLD-MCNC: 7.6 G/DL (ref 13–16)
HGB UR QL STRIP: NEGATIVE
IMM GRANULOCYTES # BLD AUTO: 0 K/UL (ref 0–0.04)
IMM GRANULOCYTES NFR BLD AUTO: 0 % (ref 0–0.5)
INR PPP: 1 (ref 0.9–1.1)
IRON SATN MFR SERPL: 3 % (ref 20–50)
IRON SERPL-MCNC: 15 UG/DL (ref 50–175)
KETONES UR QL STRIP.AUTO: NEGATIVE MG/DL
LEUKOCYTE ESTERASE UR QL STRIP.AUTO: NEGATIVE
LIPASE SERPL-CCNC: 237 U/L (ref 73–393)
LYMPHOCYTES # BLD: 2.4 K/UL (ref 0.9–3.6)
LYMPHOCYTES NFR BLD: 22 % (ref 21–52)
MCH RBC QN AUTO: 20.1 PG (ref 24–34)
MCH RBC QN AUTO: 20.1 PG (ref 24–34)
MCH RBC QN AUTO: 20.9 PG (ref 24–34)
MCH RBC QN AUTO: 21.8 PG (ref 24–34)
MCHC RBC AUTO-ENTMCNC: 28.4 G/DL (ref 31–37)
MCHC RBC AUTO-ENTMCNC: 28.6 G/DL (ref 31–37)
MCHC RBC AUTO-ENTMCNC: 29.1 G/DL (ref 31–37)
MCHC RBC AUTO-ENTMCNC: 29.7 G/DL (ref 31–37)
MCV RBC AUTO: 70.5 FL (ref 78–100)
MONOCYTES # BLD: 1.2 K/UL (ref 0.05–1.2)
MONOCYTES NFR BLD: 11 % (ref 3–10)
NEUTS SEG # BLD: 7 K/UL (ref 1.8–8)
NEUTS SEG NFR BLD: 65 % (ref 40–73)
NITRITE UR QL STRIP.AUTO: NEGATIVE
NRBC # BLD: 0.03 K/UL (ref 0–0.01)
NRBC BLD-RTO: 0.3 PER 100 WBC
PERFORMED BY:: ABNORMAL
PH UR STRIP: 6 (ref 5–8)
PLATELET # BLD AUTO: 572 K/UL (ref 135–420)
PLATELET COMMENT: ABNORMAL
PMV BLD AUTO: 9 FL (ref 9.2–11.8)
POTASSIUM SERPL-SCNC: 3.6 MMOL/L (ref 3.5–5.5)
POTASSIUM SERPL-SCNC: 3.9 MMOL/L (ref 3.5–5.5)
PROT SERPL-MCNC: 7.3 G/DL (ref 6.4–8.2)
PROT UR STRIP-MCNC: >300 MG/DL
PROTHROMBIN TIME: 13.4 SEC (ref 11.9–14.7)
RBC # BLD AUTO: 3.08 M/UL (ref 4.35–5.65)
RBC #/AREA URNS HPF: ABNORMAL /HPF (ref 0–2)
RBC MORPH BLD: ABNORMAL
SODIUM SERPL-SCNC: 141 MMOL/L (ref 136–145)
SODIUM SERPL-SCNC: 142 MMOL/L (ref 136–145)
SP GR UR REFRACTOMETRY: 1.02 (ref 1–1.03)
THERAPEUTIC RANGE: NORMAL SEC (ref 82–109)
TIBC SERPL-MCNC: 436 UG/DL (ref 250–450)
UROBILINOGEN UR QL STRIP.AUTO: 1 EU/DL (ref 0.2–1)
WBC # BLD AUTO: 10.8 K/UL (ref 4.6–13.2)
WBC URNS QL MICRO: ABNORMAL /HPF (ref 0–4)

## 2023-10-18 PROCEDURE — G0378 HOSPITAL OBSERVATION PER HR: HCPCS

## 2023-10-18 PROCEDURE — 6370000000 HC RX 637 (ALT 250 FOR IP): Performed by: INTERNAL MEDICINE

## 2023-10-18 PROCEDURE — 6360000002 HC RX W HCPCS: Performed by: EMERGENCY MEDICINE

## 2023-10-18 PROCEDURE — 96375 TX/PRO/DX INJ NEW DRUG ADDON: CPT

## 2023-10-18 PROCEDURE — 80053 COMPREHEN METABOLIC PANEL: CPT

## 2023-10-18 PROCEDURE — 30233N1 TRANSFUSION OF NONAUTOLOGOUS RED BLOOD CELLS INTO PERIPHERAL VEIN, PERCUTANEOUS APPROACH: ICD-10-PCS | Performed by: INTERNAL MEDICINE

## 2023-10-18 PROCEDURE — 6360000002 HC RX W HCPCS

## 2023-10-18 PROCEDURE — 83690 ASSAY OF LIPASE: CPT

## 2023-10-18 PROCEDURE — 85014 HEMATOCRIT: CPT

## 2023-10-18 PROCEDURE — 3600007502: Performed by: INTERNAL MEDICINE

## 2023-10-18 PROCEDURE — 2580000003 HC RX 258: Performed by: INTERNAL MEDICINE

## 2023-10-18 PROCEDURE — 36430 TRANSFUSION BLD/BLD COMPNT: CPT

## 2023-10-18 PROCEDURE — 6360000002 HC RX W HCPCS: Performed by: INTERNAL MEDICINE

## 2023-10-18 PROCEDURE — 81001 URINALYSIS AUTO W/SCOPE: CPT

## 2023-10-18 PROCEDURE — 99285 EMERGENCY DEPT VISIT HI MDM: CPT

## 2023-10-18 PROCEDURE — 43239 EGD BIOPSY SINGLE/MULTIPLE: CPT | Performed by: INTERNAL MEDICINE

## 2023-10-18 PROCEDURE — 6370000000 HC RX 637 (ALT 250 FOR IP): Performed by: NURSE PRACTITIONER

## 2023-10-18 PROCEDURE — 0DB68ZX EXCISION OF STOMACH, VIA NATURAL OR ARTIFICIAL OPENING ENDOSCOPIC, DIAGNOSTIC: ICD-10-PCS | Performed by: INTERNAL MEDICINE

## 2023-10-18 PROCEDURE — C9113 INJ PANTOPRAZOLE SODIUM, VIA: HCPCS | Performed by: INTERNAL MEDICINE

## 2023-10-18 PROCEDURE — 85730 THROMBOPLASTIN TIME PARTIAL: CPT

## 2023-10-18 PROCEDURE — P9016 RBC LEUKOCYTES REDUCED: HCPCS

## 2023-10-18 PROCEDURE — 2580000003 HC RX 258: Performed by: EMERGENCY MEDICINE

## 2023-10-18 PROCEDURE — 2709999900 HC NON-CHARGEABLE SUPPLY: Performed by: INTERNAL MEDICINE

## 2023-10-18 PROCEDURE — 86901 BLOOD TYPING SEROLOGIC RH(D): CPT

## 2023-10-18 PROCEDURE — 82962 GLUCOSE BLOOD TEST: CPT

## 2023-10-18 PROCEDURE — 85610 PROTHROMBIN TIME: CPT

## 2023-10-18 PROCEDURE — 82728 ASSAY OF FERRITIN: CPT

## 2023-10-18 PROCEDURE — 85025 COMPLETE CBC W/AUTO DIFF WBC: CPT

## 2023-10-18 PROCEDURE — 82272 OCCULT BLD FECES 1-3 TESTS: CPT

## 2023-10-18 PROCEDURE — 3700000000 HC ANESTHESIA ATTENDED CARE: Performed by: INTERNAL MEDICINE

## 2023-10-18 PROCEDURE — 3600007512: Performed by: INTERNAL MEDICINE

## 2023-10-18 PROCEDURE — 2580000003 HC RX 258: Performed by: NURSE PRACTITIONER

## 2023-10-18 PROCEDURE — 85018 HEMOGLOBIN: CPT

## 2023-10-18 PROCEDURE — 2500000003 HC RX 250 WO HCPCS

## 2023-10-18 PROCEDURE — 0DB78ZX EXCISION OF STOMACH, PYLORUS, VIA NATURAL OR ARTIFICIAL OPENING ENDOSCOPIC, DIAGNOSTIC: ICD-10-PCS | Performed by: INTERNAL MEDICINE

## 2023-10-18 PROCEDURE — 1100000000 HC RM PRIVATE

## 2023-10-18 PROCEDURE — 83540 ASSAY OF IRON: CPT

## 2023-10-18 PROCEDURE — 74176 CT ABD & PELVIS W/O CONTRAST: CPT

## 2023-10-18 PROCEDURE — 86900 BLOOD TYPING SEROLOGIC ABO: CPT

## 2023-10-18 PROCEDURE — 80048 BASIC METABOLIC PNL TOTAL CA: CPT

## 2023-10-18 PROCEDURE — 99222 1ST HOSP IP/OBS MODERATE 55: CPT | Performed by: INTERNAL MEDICINE

## 2023-10-18 PROCEDURE — 96374 THER/PROPH/DIAG INJ IV PUSH: CPT

## 2023-10-18 PROCEDURE — 3700000001 HC ADD 15 MINUTES (ANESTHESIA): Performed by: INTERNAL MEDICINE

## 2023-10-18 PROCEDURE — 86850 RBC ANTIBODY SCREEN: CPT

## 2023-10-18 RX ORDER — PANTOPRAZOLE SODIUM 40 MG/10ML
40 INJECTION, POWDER, LYOPHILIZED, FOR SOLUTION INTRAVENOUS ONCE
Status: COMPLETED | OUTPATIENT
Start: 2023-10-18 | End: 2023-10-18

## 2023-10-18 RX ORDER — INSULIN LISPRO 100 [IU]/ML
0-4 INJECTION, SOLUTION INTRAVENOUS; SUBCUTANEOUS NIGHTLY
Status: DISCONTINUED | OUTPATIENT
Start: 2023-10-18 | End: 2023-10-19 | Stop reason: HOSPADM

## 2023-10-18 RX ORDER — PROPOFOL 10 MG/ML
INJECTION, EMULSION INTRAVENOUS PRN
Status: DISCONTINUED | OUTPATIENT
Start: 2023-10-18 | End: 2023-10-18 | Stop reason: SDUPTHER

## 2023-10-18 RX ORDER — SODIUM CHLORIDE 0.9 % (FLUSH) 0.9 %
5-40 SYRINGE (ML) INJECTION EVERY 12 HOURS SCHEDULED
Status: CANCELLED | OUTPATIENT
Start: 2023-10-18

## 2023-10-18 RX ORDER — POLYETHYLENE GLYCOL 3350 17 G/17G
17 POWDER, FOR SOLUTION ORAL DAILY PRN
Status: DISCONTINUED | OUTPATIENT
Start: 2023-10-18 | End: 2023-10-19 | Stop reason: HOSPADM

## 2023-10-18 RX ORDER — CARVEDILOL 12.5 MG/1
12.5 TABLET ORAL 2 TIMES DAILY
Status: DISCONTINUED | OUTPATIENT
Start: 2023-10-18 | End: 2023-10-18

## 2023-10-18 RX ORDER — SODIUM CHLORIDE 0.9 % (FLUSH) 0.9 %
5-40 SYRINGE (ML) INJECTION PRN
Status: DISCONTINUED | OUTPATIENT
Start: 2023-10-18 | End: 2023-10-19 | Stop reason: HOSPADM

## 2023-10-18 RX ORDER — ONDANSETRON 2 MG/ML
4 INJECTION INTRAMUSCULAR; INTRAVENOUS EVERY 6 HOURS PRN
Status: DISCONTINUED | OUTPATIENT
Start: 2023-10-18 | End: 2023-10-19 | Stop reason: HOSPADM

## 2023-10-18 RX ORDER — SODIUM CHLORIDE 0.9 % (FLUSH) 0.9 %
5-40 SYRINGE (ML) INJECTION PRN
Status: CANCELLED | OUTPATIENT
Start: 2023-10-18

## 2023-10-18 RX ORDER — ACETAMINOPHEN 650 MG/1
650 SUPPOSITORY RECTAL EVERY 6 HOURS PRN
Status: DISCONTINUED | OUTPATIENT
Start: 2023-10-18 | End: 2023-10-19 | Stop reason: HOSPADM

## 2023-10-18 RX ORDER — INSULIN LISPRO 100 [IU]/ML
0-4 INJECTION, SOLUTION INTRAVENOUS; SUBCUTANEOUS
Status: DISCONTINUED | OUTPATIENT
Start: 2023-10-18 | End: 2023-10-19 | Stop reason: HOSPADM

## 2023-10-18 RX ORDER — PANTOPRAZOLE SODIUM 40 MG/10ML
40 INJECTION, POWDER, LYOPHILIZED, FOR SOLUTION INTRAVENOUS 2 TIMES DAILY
Status: DISCONTINUED | OUTPATIENT
Start: 2023-10-18 | End: 2023-10-19 | Stop reason: HOSPADM

## 2023-10-18 RX ORDER — KETOROLAC TROMETHAMINE 30 MG/ML
30 INJECTION, SOLUTION INTRAMUSCULAR; INTRAVENOUS
Status: COMPLETED | OUTPATIENT
Start: 2023-10-18 | End: 2023-10-18

## 2023-10-18 RX ORDER — SODIUM CHLORIDE 9 MG/ML
INJECTION, SOLUTION INTRAVENOUS PRN
Status: CANCELLED | OUTPATIENT
Start: 2023-10-18

## 2023-10-18 RX ORDER — 0.9 % SODIUM CHLORIDE 0.9 %
1000 INTRAVENOUS SOLUTION INTRAVENOUS ONCE
Status: COMPLETED | OUTPATIENT
Start: 2023-10-18 | End: 2023-10-18

## 2023-10-18 RX ORDER — ONDANSETRON 4 MG/1
4 TABLET, ORALLY DISINTEGRATING ORAL EVERY 8 HOURS PRN
Status: DISCONTINUED | OUTPATIENT
Start: 2023-10-18 | End: 2023-10-19 | Stop reason: HOSPADM

## 2023-10-18 RX ORDER — AMLODIPINE BESYLATE 5 MG/1
10 TABLET ORAL DAILY
Status: DISCONTINUED | OUTPATIENT
Start: 2023-10-18 | End: 2023-10-19 | Stop reason: HOSPADM

## 2023-10-18 RX ORDER — SODIUM CHLORIDE 0.9 % (FLUSH) 0.9 %
5-40 SYRINGE (ML) INJECTION EVERY 12 HOURS SCHEDULED
Status: DISCONTINUED | OUTPATIENT
Start: 2023-10-18 | End: 2023-10-19 | Stop reason: HOSPADM

## 2023-10-18 RX ORDER — DEXTROSE MONOHYDRATE 100 MG/ML
INJECTION, SOLUTION INTRAVENOUS CONTINUOUS PRN
Status: DISCONTINUED | OUTPATIENT
Start: 2023-10-18 | End: 2023-10-19 | Stop reason: HOSPADM

## 2023-10-18 RX ORDER — CARVEDILOL 12.5 MG/1
12.5 TABLET ORAL 2 TIMES DAILY
Status: DISCONTINUED | OUTPATIENT
Start: 2023-10-18 | End: 2023-10-19 | Stop reason: HOSPADM

## 2023-10-18 RX ORDER — ACETAMINOPHEN 325 MG/1
650 TABLET ORAL EVERY 6 HOURS PRN
Status: DISCONTINUED | OUTPATIENT
Start: 2023-10-18 | End: 2023-10-19 | Stop reason: HOSPADM

## 2023-10-18 RX ORDER — ONDANSETRON 2 MG/ML
4 INJECTION INTRAMUSCULAR; INTRAVENOUS
Status: COMPLETED | OUTPATIENT
Start: 2023-10-18 | End: 2023-10-18

## 2023-10-18 RX ORDER — MORPHINE SULFATE 4 MG/ML
4 INJECTION, SOLUTION INTRAMUSCULAR; INTRAVENOUS
Status: COMPLETED | OUTPATIENT
Start: 2023-10-18 | End: 2023-10-18

## 2023-10-18 RX ORDER — ONDANSETRON 2 MG/ML
4 INJECTION INTRAMUSCULAR; INTRAVENOUS
Status: CANCELLED | OUTPATIENT
Start: 2023-10-18 | End: 2023-10-19

## 2023-10-18 RX ORDER — TAMSULOSIN HYDROCHLORIDE 0.4 MG/1
0.4 CAPSULE ORAL DAILY
Status: DISCONTINUED | OUTPATIENT
Start: 2023-10-18 | End: 2023-10-19 | Stop reason: HOSPADM

## 2023-10-18 RX ORDER — SODIUM CHLORIDE 9 MG/ML
INJECTION, SOLUTION INTRAVENOUS PRN
Status: DISCONTINUED | OUTPATIENT
Start: 2023-10-18 | End: 2023-10-19 | Stop reason: HOSPADM

## 2023-10-18 RX ORDER — SODIUM CHLORIDE 9 MG/ML
INJECTION, SOLUTION INTRAVENOUS CONTINUOUS
Status: DISCONTINUED | OUTPATIENT
Start: 2023-10-18 | End: 2023-10-18

## 2023-10-18 RX ORDER — LISINOPRIL 20 MG/1
40 TABLET ORAL DAILY
Status: DISCONTINUED | OUTPATIENT
Start: 2023-10-18 | End: 2023-10-19 | Stop reason: HOSPADM

## 2023-10-18 RX ORDER — SODIUM CHLORIDE, SODIUM LACTATE, POTASSIUM CHLORIDE, CALCIUM CHLORIDE 600; 310; 30; 20 MG/100ML; MG/100ML; MG/100ML; MG/100ML
INJECTION, SOLUTION INTRAVENOUS CONTINUOUS
Status: DISCONTINUED | OUTPATIENT
Start: 2023-10-18 | End: 2023-10-18

## 2023-10-18 RX ORDER — LIDOCAINE HYDROCHLORIDE 20 MG/ML
INJECTION, SOLUTION INFILTRATION; PERINEURAL PRN
Status: DISCONTINUED | OUTPATIENT
Start: 2023-10-18 | End: 2023-10-18 | Stop reason: SDUPTHER

## 2023-10-18 RX ADMIN — KETOROLAC TROMETHAMINE 30 MG: 30 INJECTION, SOLUTION INTRAMUSCULAR at 01:57

## 2023-10-18 RX ADMIN — SODIUM CHLORIDE: 9 INJECTION, SOLUTION INTRAVENOUS at 03:59

## 2023-10-18 RX ADMIN — PANTOPRAZOLE SODIUM 40 MG: 40 INJECTION, POWDER, FOR SOLUTION INTRAVENOUS at 20:52

## 2023-10-18 RX ADMIN — PROPOFOL 100 MG: 10 INJECTION, EMULSION INTRAVENOUS at 14:28

## 2023-10-18 RX ADMIN — SODIUM CHLORIDE, PRESERVATIVE FREE 10 ML: 5 INJECTION INTRAVENOUS at 20:56

## 2023-10-18 RX ADMIN — LIDOCAINE HYDROCHLORIDE 100 MG: 20 INJECTION, SOLUTION INFILTRATION; PERINEURAL at 14:16

## 2023-10-18 RX ADMIN — ONDANSETRON 4 MG: 2 INJECTION INTRAMUSCULAR; INTRAVENOUS at 22:32

## 2023-10-18 RX ADMIN — AMLODIPINE BESYLATE 10 MG: 5 TABLET ORAL at 08:13

## 2023-10-18 RX ADMIN — SODIUM CHLORIDE, PRESERVATIVE FREE 10 ML: 5 INJECTION INTRAVENOUS at 08:15

## 2023-10-18 RX ADMIN — TAMSULOSIN HYDROCHLORIDE 0.4 MG: 0.4 CAPSULE ORAL at 08:13

## 2023-10-18 RX ADMIN — CARVEDILOL 12.5 MG: 12.5 TABLET, FILM COATED ORAL at 05:08

## 2023-10-18 RX ADMIN — ONDANSETRON 4 MG: 2 INJECTION INTRAMUSCULAR; INTRAVENOUS at 01:56

## 2023-10-18 RX ADMIN — PROPOFOL 20 MG: 10 INJECTION, EMULSION INTRAVENOUS at 14:32

## 2023-10-18 RX ADMIN — PROPOFOL 50 MG: 10 INJECTION, EMULSION INTRAVENOUS at 14:30

## 2023-10-18 RX ADMIN — MORPHINE SULFATE 4 MG: 4 INJECTION, SOLUTION INTRAMUSCULAR; INTRAVENOUS at 01:58

## 2023-10-18 RX ADMIN — CARVEDILOL 12.5 MG: 12.5 TABLET, FILM COATED ORAL at 20:53

## 2023-10-18 RX ADMIN — PROPOFOL 30 MG: 10 INJECTION, EMULSION INTRAVENOUS at 14:34

## 2023-10-18 RX ADMIN — LISINOPRIL 40 MG: 20 TABLET ORAL at 08:13

## 2023-10-18 RX ADMIN — PANTOPRAZOLE SODIUM 40 MG: 40 INJECTION, POWDER, FOR SOLUTION INTRAVENOUS at 09:07

## 2023-10-18 RX ADMIN — SODIUM CHLORIDE 1000 ML: 9 INJECTION, SOLUTION INTRAVENOUS at 01:54

## 2023-10-18 RX ADMIN — ACETAMINOPHEN 650 MG: 325 TABLET ORAL at 22:32

## 2023-10-18 RX ADMIN — SODIUM CHLORIDE, POTASSIUM CHLORIDE, SODIUM LACTATE AND CALCIUM CHLORIDE: 600; 310; 30; 20 INJECTION, SOLUTION INTRAVENOUS at 13:22

## 2023-10-18 ASSESSMENT — ENCOUNTER SYMPTOMS
ABDOMINAL DISTENTION: 0
SHORTNESS OF BREATH: 0
DIARRHEA: 0
NAUSEA: 0
CHEST TIGHTNESS: 0
ANAL BLEEDING: 0
BLOOD IN STOOL: 0
CONSTIPATION: 0
ABDOMINAL PAIN: 1
RECTAL PAIN: 0
VOMITING: 0

## 2023-10-18 ASSESSMENT — PAIN DESCRIPTION - DIRECTION: RADIATING_TOWARDS: BACK

## 2023-10-18 ASSESSMENT — PAIN SCALES - GENERAL
PAINLEVEL_OUTOF10: 3
PAINLEVEL_OUTOF10: 0
PAINLEVEL_OUTOF10: 2
PAINLEVEL_OUTOF10: 0
PAINLEVEL_OUTOF10: 4

## 2023-10-18 ASSESSMENT — PAIN DESCRIPTION - DESCRIPTORS
DESCRIPTORS: DISCOMFORT
DESCRIPTORS: ACHING

## 2023-10-18 ASSESSMENT — PAIN DESCRIPTION - ORIENTATION
ORIENTATION: LEFT;LOWER;ANTERIOR
ORIENTATION: RIGHT;LEFT;UPPER;LOWER;MID

## 2023-10-18 ASSESSMENT — PAIN DESCRIPTION - LOCATION
LOCATION: ABDOMEN
LOCATION: ABDOMEN

## 2023-10-18 ASSESSMENT — LIFESTYLE VARIABLES: SMOKING_STATUS: 1

## 2023-10-18 ASSESSMENT — PAIN - FUNCTIONAL ASSESSMENT: PAIN_FUNCTIONAL_ASSESSMENT: NONE - DENIES PAIN

## 2023-10-18 NOTE — CONSULTS
Gastroenterology Consult Note       Primary GI Physician: Moon Schuster MD    Referring Provider: Hospitalist team    Consult Date:  10/18/2023    Admit Date:  10/18/2023    Chief Complaint: Severe anemia and question of GI bleed. Subjective:     History of Present Illness:  Patient is a 61 y.o. male who is seen in consultation for evaluation for severe anemia and question of GI bleed. The history is from the patient and their medical records. Patient presented emergency room yesterday with dyspnea on exertion and abdominal pain in the left upper and lower quadrant. The abdominal pain did not change with movement or bowel movement and was reproducible on palpation in the left lower quadrant. This is occurred within the last 3 days. He is also noted decrease in his exercise ability and dyspnea on exertion has occurred. The patient came the emergency room he was found to be severely anemic with a hemoglobin/hematocrit of 6.2/21.7, MCV 70.5. His hemoglobin then decreased to 5.8 and has been given 2 units of packed red blood cells. Review of his records shows he has had a gradual decrease in his hemoglobin/hematocrit over the last 2 years from normal.  Platelet count has remained normal during this decrease. He has noticed no gross rectal bleeding but he did say his stool was dark upon admission. He attributed the dark stool to edgar crackers EGD. Stool Hemoccult testing was negative for blood this admission. Pertinent labs show liver function tests are normal except for alkaline phosphatase of 120, albumin 2.5, glucose 196, BUN 19 and creatinine 0.86 normal electrolytes. The patient has had increased bilateral pulmonary nodules over the last 2 to 3 years. It is unclear if this etiology is infectious, metastatic disease or primary lung cancer.   He is scheduled be seen by pulmonologist.  Review of his records shows he has had the following number of CT scans: Abdomen and pelvis-4, chest-3, 11.6 - 14.5 % Final     Platelets   Date Value Ref Range Status   10/18/2023 572 (H) 135 - 420 K/uL Final           Lab Results   Component Value Date    ALT 18 10/18/2023    AST 17 10/18/2023    ALKPHOS 120 (H) 10/18/2023    BILITOT 0.2 10/18/2023       Lab Results   Component Value Date    LIPASE 237 10/18/2023             CAT Scan Result (most recent):  CT ABDOMEN PELVIS WO IV CONTRAST 10/18/2023    Narrative  EXAM: CT ABDOMEN PELVIS WO CONTRAST    INDICATION: Pain    COMPARISON: August 9, 2023    IV CONTRAST: None. ORAL CONTRAST: None    TECHNIQUE:  Thin axial images were obtained through the abdomen and pelvis. Coronal and  sagittal reformats were generated. CT dose reduction was achieved through use of  a standardized protocol tailored for this examination and automatic exposure  control for dose modulation. The absence of intravenous contrast material reduces the sensitivity for  evaluation of the vasculature and solid organs. FINDINGS:  LOWER THORAX: Numerous small bilateral pulmonary nodules are not significantly  changed. LIVER: No mass. BILIARY TREE: Gallbladder is within normal limits. CBD is not dilated. SPLEEN: within normal limits. PANCREAS: No focal abnormality. ADRENALS: Unremarkable. KIDNEYS/URETERS: No calculus or hydronephrosis. STOMACH: Unremarkable. SMALL BOWEL: No dilatation or wall thickening. COLON: No dilatation or wall thickening. APPENDIX: Unremarkable. PERITONEUM: No ascites or pneumoperitoneum. RETROPERITONEUM: No lymphadenopathy or aortic aneurysm. REPRODUCTIVE ORGANS: Unremarkable. URINARY BLADDER: No mass or calculus. BONES: No destructive bone lesion. Unchanged epidural stimulator device. ABDOMINAL WALL: No mass or hernia. ADDITIONAL COMMENTS: N/A    Impression  No evidence of acute abdominal or pelvic process. Unchanged numerous small  bilateral pulmonary nodules.           MRI Result (most recent):  No results found for this or any previous visit from the

## 2023-10-18 NOTE — ANESTHESIA PRE PROCEDURE
.                 Abdominal: normal exam  (+) obese,           Vascular: negative vascular ROS. Other Findings:           Anesthesia Plan      TIVA and MAC     ASA 3       Induction: intravenous. Anesthetic plan and risks discussed with patient. Use of blood products discussed with patient whom consented to blood products.    Plan discussed with surgical team.                    CHRIS Bhatti - MARAL   10/18/2023

## 2023-10-18 NOTE — H&P (VIEW-ONLY)
Gastroenterology Consult Note       Primary GI Physician: Willy Cortez MD    Referring Provider: Hospitalist team    Consult Date:  10/18/2023    Admit Date:  10/18/2023    Chief Complaint: Severe anemia and question of GI bleed. Subjective:     History of Present Illness:  Patient is a 61 y.o. male who is seen in consultation for evaluation for severe anemia and question of GI bleed. The history is from the patient and their medical records. Patient presented emergency room yesterday with dyspnea on exertion and abdominal pain in the left upper and lower quadrant. The abdominal pain did not change with movement or bowel movement and was reproducible on palpation in the left lower quadrant. This is occurred within the last 3 days. He is also noted decrease in his exercise ability and dyspnea on exertion has occurred. The patient came the emergency room he was found to be severely anemic with a hemoglobin/hematocrit of 6.2/21.7, MCV 70.5. His hemoglobin then decreased to 5.8 and has been given 2 units of packed red blood cells. Review of his records shows he has had a gradual decrease in his hemoglobin/hematocrit over the last 2 years from normal.  Platelet count has remained normal during this decrease. He has noticed no gross rectal bleeding but he did say his stool was dark upon admission. He attributed the dark stool to edgar crackers EGD. Stool Hemoccult testing was negative for blood this admission. Pertinent labs show liver function tests are normal except for alkaline phosphatase of 120, albumin 2.5, glucose 196, BUN 19 and creatinine 0.86 normal electrolytes. The patient has had increased bilateral pulmonary nodules over the last 2 to 3 years. It is unclear if this etiology is infectious, metastatic disease or primary lung cancer.   He is scheduled be seen by pulmonologist.  Review of his records shows he has had the following number of CT scans: Abdomen and pelvis-4, chest-3, treatment. PMH:  Past Medical History:   Diagnosis Date    Acute respiratory failure with hypoxia ([address])     COVID-19 10/2022    Diabetes mellitus ([address])     Hypertension     Hypertensive urgency     Pulmonary nodules     Sepsis ([address])        PSH:  Past Surgical History:   Procedure Laterality Date    BACK SURGERY      CHOLECYSTECTOMY         Allergies:  No Known Allergies    Home Medications:  Prior to Admission medications    Medication Sig Start Date End Date Taking?  Authorizing Provider   sildenafil (REVATIO) 20 MG tablet Take 1 tablet by mouth daily as needed 7/28/22   Automatic Reconciliation, Ar   tamsulosin (FLOMAX) 0.4 MG capsule Take 1 capsule by mouth daily 7/28/22   Automatic Reconciliation, Ar   amLODIPine (NORVASC) 10 MG tablet Take 1 tablet by mouth daily 2/18/23   Gloria Wesley MD   carvedilol (COREG) 12.5 MG tablet Take 1 tablet by mouth 2 times daily 2/24/23   Gloria Wesley MD   furosemide (LASIX) 20 MG tablet Take 1 tablet by mouth daily 2/16/23   Gloria Wesley MD   lisinopril (PRINIVIL;ZESTRIL) 40 MG tablet Take 1 tablet by mouth daily 2/16/23   Gloria Wesley MD   metFORMIN (GLUCOPHAGE) 1000 MG tablet Take 1 tablet by mouth 2 times daily 2/24/23   Gloria Wesley MD       Hospital Medications:  Current Facility-Administered Medications   Medication Dose Route Frequency    0.9 % sodium chloride infusion   IntraVENous PRN    sodium chloride flush 0.9 % injection 5-40 mL  5-40 mL IntraVENous 2 times per day    sodium chloride flush 0.9 % injection 5-40 mL  5-40 mL IntraVENous PRN    0.9 % sodium chloride infusion   IntraVENous PRN    ondansetron (ZOFRAN-ODT) disintegrating tablet 4 mg  4 mg Oral Q8H PRN    Or    ondansetron (ZOFRAN) injection 4 mg  4 mg IntraVENous Q6H PRN    polyethylene glycol (GLYCOLAX) packet 17 g  17 g Oral Daily PRN    acetaminophen (TYLENOL) tablet 650 mg  650 mg Oral Q6H PRN    Or    acetaminophen (TYLENOL) suppository 650 mg  650 mg

## 2023-10-18 NOTE — TELEPHONE ENCOUNTER
Patient with multiple duodenal ulcers and erosive gastritis and duodenitis on EGD today. Anticipate discharge from hospital tomorrow. Please call him and schedule him for colonoscopy on Wednesday, 10/25/2023 and give him the bowel preparation and date and time. He should hold his diabetes medication the morning of the procedure. Also, please contact him and schedule an office follow-up visit with me in 12 weeks time. Thank you.

## 2023-10-18 NOTE — ED NOTES
Pt resting in position of comfort. Pt denies shortness of breath, difficulty breathing. States pain is improving.       William Peace, RN  10/18/23 [id number]

## 2023-10-18 NOTE — OP NOTE
EGD procedure note    Date of procedure: 10/18/2023    Indication for procedure: Severe symptomatic anemia    Type of procedure: Upper endoscopy and biopsies of gastric body and antrum to exclude H. pylori. Anesthesia classification: ASA class 2    Patient history and physical has been accomplished and documented. Patient is assessed and determined to be an appropriate candidate for planned procedure and sedation. The patient was assessed immediately prior to sedation. Sedation plan: MAC per anesthesia. Surgical assistant: Not applicable    Airway assessment: Range of motion: normal, mouth opening: Normal, visual obstruction: No.    PREOP EXAM:  Unchanged. VS: Reviewed  Gen: WDWN in NAD  CV: RRR, no murmur  Resp: CTAB  Abd: Soft, NTND, +BS  Extrem: No cyanosis or edema  Neuro: Awake and alert      Informed consent obtained: Yes. The indications, risks, including but not limited to bleeding, perforation, infection, death, potential for failure to visualize or diagnose neoplasia, alternatives, benefits, discussed in detail with the patient prior to the procedure. Patient understands and agrees to the procedure. Patient identity and procedure were verified, consent was obtained, and is consistent with the consent form in the patient's records. INSTRUMENT: Olympus upper endoscope    PROCEDURE FINDINGS:    OROPHARYNX: Normal.  The oropharynx had no evidence of erythema or edema. ESOPHAGUS:  Esophageal mucosa normal with no masses noted in the proximal, mid, and distal esophagus. No endoscopic features of eosinophilic esophagitis were noted. The Z-line was at 43 cm. and was normal.    No hiatal hernia was noted distally. STOMACH: Stomach was then evaluated including the cardia and fundus on retroflexion view. Evaluation of the gastric body, antrum, and pylorus were normal, including normal gastric mucosa with no masses, ulcers, or mucosal abnormalities except the following.   There is moderate erosive gastritis in the distal body and antrum of the stomach. There is no blood or stigmata of hemorrhage. Biopsies were obtained in the gastric body and antrum to exclude H. Pylori. Retroflexion view of the cardia and fundus were normal.     DUODENUM:  The duodenal bulb and descending duodenum were then evaluated and found to be normal except for the following. In the bulb, there was moderate erosive duodenitis and 6 small ulcers present. There was no blood or stigmata of hemorrhage. SPECIMENS: Body and antrum to exclude H. pylori. ESTIMATED BLOOD LOSS:  None. COMPLICATIONS:  None     COMMENTS: none    Impression:  1. Erosive gastritis. 2.  Erosive duodenitis and 6 small benign-appearing duodenal ulcers. 3.  The above is significant enough to cause the patient's anemia over time. I suspect this is from his daily ibuprofen use. Recommendations:    1.  Okay to stop start allowing the patient a clear liquid diet. 2.  Check pathology. Will notify patient with results. 3.  In the a.m., if he tolerates clear liquids and his hemoglobin hematocrit are approximately 8 and 22 and he symptomatically is better, it is okay from a GI standpoint to discharge him home. 4.  In the a.m., change to pantoprazole 40 mg twice a day for the next 12 weeks. He should remain on this dosage as an outpatient. 5.  Plan for colonoscopy as an outpatient on Wednesday, 10/25/2023. My office will notify the patient and schedule him and give him preparation instructions. 6.  Avoid all aspirin and nonsteroidal anti-inflammatory drugs until his ulcers have been proven to be healed. 7.  Anticipate planning repeat EGD and biopsies to exclude H. pylori in 12 weeks time to assess healing of his duodenal ulcers. 8.  Further recommendations pending clinical course as needed. 9.  Follow up in the office in 12 weeks time.        Atul Salcido MD

## 2023-10-18 NOTE — ED NOTES
Date: 10/18/2023  Patient Name: Parth Wheeler  MRN: [id number]  [address] [place] 1960  Date of evaluation: 10/18/2023  Provider: Mylene Mayfield MD     Chief Complaint: Abdominal pain    HPI: Parth Wheeler is a 61 y.o. male who presents to the ED with complaint of abdominal pain. He says that he started to have some pain to his left abdomen 3 to 4 days ago but it was mild and intermittent. He says yesterday the pain became constant and it got a lot worse tonight. He says he has had some nausea with no vomiting. No dysuria, hematuria, or other urinary changes. No bowel issues today. He says he is never had pain like this before. He has a surgical history significant for an appendectomy and cholecystectomy    Additional Historians:       Review of Systems:  Other systems reviewed and are negative except as noted in the HPI. Past Medical and Surgical History:  Past Medical History:   Diagnosis Date    Acute respiratory failure with hypoxia ([address])     COVID-19 10/2022    Diabetes mellitus ([address])     Hypertension     Hypertensive urgency     Pulmonary nodules     Sepsis ([address])      Past Surgical History:   Procedure Laterality Date    BACK SURGERY      CHOLECYSTECTOMY           Social History:     Social History     Tobacco Use    Smoking status: Every Day     Types: Cigarettes    Smokeless tobacco: Never   Substance Use Topics    Alcohol use: Yes     Comment: rare    Drug use: Yes     Types: Marijuana Eunice Rival)       Family History:  History reviewed. No pertinent family history. Medications:  No current facility-administered medications on file prior to encounter.      Current Outpatient Medications on File Prior to Encounter   Medication Sig Dispense Refill    sildenafil (REVATIO) 20 MG tablet Take 1 tablet by mouth daily as needed      tamsulosin (FLOMAX) 0.4 MG capsule Take 1 capsule by mouth daily      amLODIPine (NORVASC) 10 MG tablet Take 1 tablet by mouth daily      carvedilol (COREG) 12.5

## 2023-10-18 NOTE — PROGRESS NOTES
Admission Medication Reconciliation:    Information obtained from:  Patient/ fill history    Comments/Recommendations: Reviewed PTA medications and patient's allergies. Patient claims to be taking all of these medications regularly, fill history inconsistent        Allergies:  Patient has no known allergies. Significant PMH/Disease States:   Past Medical History:   Diagnosis Date    Acute respiratory failure with hypoxia ([address])     COVID-19 10/2022    Diabetes mellitus ([address])     Hypertension     Hypertensive urgency     Pulmonary nodules     Sepsis [facility])      Chief Complaint for this Admission:    Chief Complaint   Patient presents with    Abdominal Pain     Prior to Admission Medications:   Prior to Admission medications    Medication Sig Start Date End Date Taking?  Authorizing Provider   Chlorpheniramine-DM (CORICIDIN COUGH/COLD) 4-30 MG TABS Take 1 tablet by mouth 2 times daily as needed (cough) 9/26/23 10/18/23  Ruth Ann Brown DO   sildenafil (REVATIO) 20 MG tablet Take 1 tablet by mouth daily as needed 7/28/22   Automatic Reconciliation, Ar   tamsulosin (FLOMAX) 0.4 MG capsule Take 1 capsule by mouth daily 7/28/22   Automatic Reconciliation, Ar   amLODIPine (NORVASC) 10 MG tablet Take 1 tablet by mouth daily 11/30/21 10/18/23  Automatic Reconciliation, Ar   piperacillin-tazobactam (ZOSYN) 40.5 (36-4.5) g injection ceived the following from [address] [facility]: Outside name: piperacillin-tazobactam (ZOSYN) 40.5 gram injection  Patient not taking: Reported on 8/9/2023 11/15/22 10/18/23  Automatic Reconciliation, Ar   amLODIPine (NORVASC) 10 MG tablet Take 1 tablet by mouth daily 2/18/23   ProviderGloria MD   carvedilol (COREG) 12.5 MG tablet Take 1 tablet by mouth 2 times daily 2/24/23   Gloria Wesley MD   furosemide (LASIX) 20 MG tablet Take 1 tablet by mouth daily 2/16/23   Gloria Wesley MD   lisinopril (PRINIVIL;ZESTRIL) 40 MG tablet Take 1 tablet by mouth daily 2/16/23

## 2023-10-18 NOTE — CARE COORDINATION
Case Management Assessment  Initial Evaluation    Date/Time of Evaluation: 10/18/2023 8:58 AM  Assessment Completed by: Radha Carbajal    If patient is discharged prior to next notation, then this note serves as note for discharge by case management. Patient Name: Trevon Cleary                   YOB: 1960  Diagnosis: Symptomatic anemia [D64.9]  Left lower quadrant abdominal pain [R10.32]                   Date / Time: 10/18/2023  1:29 AM    Patient Admission Status: Observation   Readmission Risk (Low < 19, Mod (19-27), High > 27): No data recorded  Current PCP: CHRIS Garza NP  PCP verified by CM? Chart Reviewed: Yes      History Provided by:    Patient Orientation:      Patient Cognition:      Hospitalization in the last 30 days (Readmission):  No    If yes, Readmission Assessment in  Navigator will be completed. Advance Directives:      Code Status: Full Code   Patient's Primary Decision Maker is:        Discharge Planning:    Patient lives with: Alone Type of Home: House  Primary Care Giver:    Patient Support Systems include: Friends/Neighbors   Current Financial resources:    Current community resources:    Current services prior to admission: None            Current DME:              Type of Home Care services:  None    ADLS  Prior functional level:    Current functional level:      PT AM-PAC:   /24  OT AM-PAC:   /24    Family can provide assistance at DC: Would you like Case Management to discuss the discharge plan with any other family members/significant others, and if so, who?     Plans to Return to Present Housing:    Other Identified Issues/Barriers to RETURNING to current housing: yes  Potential Assistance needed at discharge: N/A            Potential DME:    Patient expects to discharge to: [address] [place] [address] for transportation at discharge: Family    Financial    Payor: [address] [place] [address] Road / Plan: Javier Weber / Product Type: *No Product

## 2023-10-18 NOTE — ED NOTES
TRANSFER - OUT REPORT:    Verbal report given to Dannielle Leigh RN on Nicol Vogel  being transferred to icu for routine progression of patient care       Report consisted of patient's Situation, Background, Assessment and   Recommendations(SBAR). Information from the following report(s) Nurse Handoff Report, ED Encounter Summary, MAR, and Recent Results was reviewed with the receiving nurse. [place] Fall Assessment:    Presents to emergency department  because of falls (Syncope, seizure, or loss of consciousness): No  Age > 70: No  Altered Mental Status, Intoxication with alcohol or substance confusion (Disorientation, impaired judgment, poor safety awaremess, or inability to follow instructions): No  Impaired Mobility: Ambulates or transfers with assistive devices or assistance; Unable to ambulate or transer.: No  Nursing Judgement: No          Lines:   Peripheral IV 10/18/23 Right Arm (Active)       Peripheral IV 10/18/23 Left; Anterior Forearm (Active)   Site Assessment Clean, dry & intact 10/18/23 0247   Line Status Brisk blood return;Flushed;Normal saline locked;Specimen collected 10/18/23 [address] disinfected 10/18/23 0247   Phlebitis Assessment No symptoms 10/18/23 0247   Infiltration Assessment 0 10/18/23 0247   Dressing Status Clean, dry & intact 10/18/23 0247   Dressing Type Transparent 10/18/23 0247        Opportunity for questions and clarification was provided.       Patient transported with:  Monitor and Karlie Lara  10/18/23 1166

## 2023-10-18 NOTE — ANESTHESIA POSTPROCEDURE EVALUATION
Department of Anesthesiology  Postprocedure Note    Patient: Gurjit Hidalgo  MRN: [id number]  YOB: 1960  Date of evaluation: 10/18/2023      Procedure Summary     Date: 10/18/23 Room / Location: [facility] ENDO 01 / [facility] ENDOSCOPY    Anesthesia Start: 4913 Anesthesia Stop: 1808    Procedure: EGD (ICU 5) (Esophagus) Diagnosis:       Anemia, unspecified type      (Anemia, unspecified type [D64.9])    Surgeons: Joao Martin MD Responsible Provider: CHRIS Sands CRNA    Anesthesia Type: MAC ASA Status: 3          Anesthesia Type: MAC    Miguel Phase I: Miguel Score: 10    Miguel Phase II:        Anesthesia Post Evaluation    Patient location during evaluation: PACU  Patient participation: complete - patient participated  Level of consciousness: sleepy but conscious  Pain score: 0  Airway patency: patent  Nausea & Vomiting: no vomiting and no nausea  Complications: no  Cardiovascular status: blood pressure returned to baseline and hemodynamically stable  Respiratory status: room air, acceptable and spontaneous ventilation  Hydration status: stable  Multimodal analgesia pain management approach  Pain management: adequate

## 2023-10-18 NOTE — PROGRESS NOTES
Patient is not available to be assessed at this time.       [address] [facility]   [phone]

## 2023-10-18 NOTE — PROGRESS NOTES
Reviewed overnight admission with nurse practitioner patient also seen and examined by myself. Patient had dark to black stools on Saturday patient developed abdominal pain on Monday which never improved patient started having decrease in his exercise ability and dyspnea exertion for last few weeks now. Patient takes a stool was dark due to some sort of edgar crackers. Patient denies ever seeing bright red blood either by vomiting or per rectum. Patient does not think he had black stool earlier in the month either. Patient's pain is in the left lower quadrant and it is reproducible by palpation at this time. Reviewed CAT scan without contrast and showed no abnormality. Reviewed with patient that I was worried about his pulmonary nodules but that CAT scan recheck last month showed that they did not grow from earlier in the year. In fact the lymphadenopathy had improved. Patient thinks he had a colonoscopy in the last years that was within normal limits. Patient denies weight loss at this time infected gaining some weight. Although last year he did lose some weight he went down from 2 26-2 09 and now is gaining some without I wonder if this is on the same time he had severe pneumonia. I told him our plan would be to wait to see if GI had any input if they want to do any procedures on him based on his findings interestingly his stool was heme-negative though still the top of my differential was a recent GI bleed. If GI does not feel any further procedures are necessary my goal and plan is to transfuse him to hemoglobin above 7 I have added iron studies onto his labs hopefully these can be done on the blood work that was done prior to his transfusion. After this procedure if he still below 7 will give another unit if he is above 7 we will let him discharge.

## 2023-10-18 NOTE — INTERVAL H&P NOTE
Update History & Physical    The patient's History and Physical of October 18, 2023 was reviewed with the patient and I examined the patient. There was no change. The surgical site was confirmed by the patient and me. Plan: The risks, benefits, expected outcome, and alternative to the recommended procedure have been discussed with the patient. Patient understands and wants to proceed with the procedure.      Electronically signed by Cecil Ellis MD on 10/18/2023 at 1:47 PM

## 2023-10-18 NOTE — ASSESSMENT & PLAN NOTE
This is a chronic problem stable  Hold oral hypoglycemic at this time  Sliding scale every 4 hours due to n.p.o.

## 2023-10-18 NOTE — ASSESSMENT & PLAN NOTE
Hemoglobin 6.2 and 5.8 tonight down from 8.51-month ago  Unsure of etiology of anemia  Possible GI bleed states black stool although heme-negative  Abdominal pain and tenderness generalized  CT of the abdomen negative for any acute finding  No nausea vomiting no diarrhea  Patient will be transfused 2 units  Repeat CBC at 6 AM  Repeat hemoglobin hematocrit 1 hour after transfusion complete  Hold anticoagulation at this time SCDs for DVT prophylaxis  GI consultation

## 2023-10-18 NOTE — ASSESSMENT & PLAN NOTE
This is a chronic problem, stable  Continue blood pressure meds Norvasc and Coreg  Monitor, telemetry

## 2023-10-18 NOTE — CONSENT
Informed Consent for Blood Component Transfusion Note    I have discussed with the patient the rationale for blood component transfusion; its benefits in treating or preventing fatigue, organ damage, or death; and its risk which includes mild transfusion reactions, rare risk of blood borne infection, or more serious but rare reactions. I have discussed the alternatives to transfusion, including the risk and consequences of not receiving transfusion. The patient had an opportunity to ask questions and had agreed to proceed with transfusion of blood components.     Electronically signed by Latisha Le MD on 10/18/23 at 3:18 AM EDT

## 2023-10-18 NOTE — PROGRESS NOTES
9120 TRANSFER - IN REPORT:    Verbal report received from Sumit Chi RN on Rebecca Gillespie  being received from ED for routine progression of patient care      Report consisted of patient's Situation, Background, Assessment and   Recommendations(SBAR). Information from the following report(s) Nurse Handoff Report, Intake/Output, MAR, Recent Results, Med Rec Status, and Cardiac Rhythm Sinus Tach  was reviewed with the receiving nurse. Opportunity for questions and clarification was provided. Assessment completed upon patient's arrival to unit and care assumed. Pt aaox4. Skin w/d. Resp easy and nonlabored. Pt voices no c/o pain or discomfort. CBWR. Wally Mckeon NP notified of pt's elevated BP.     4406  First unit PRBC started per protocol. Maintenance IVF on hold during blood transfusion. 2729  Carvedilol 12.5mg po given for elevated BP as ordered. 0277   Rounding complete at this time. Pt. Resting quietly with call bell in reach. VSS. PRBC infusing without difficulty. 7599  Shift report given to oncoming nurse.

## 2023-10-18 NOTE — ASSESSMENT & PLAN NOTE
Abdominal CT negative  GI consultation  Unsure if GI bleed, patient states dark stools for a few days heme-negative  Abdominal tenderness throughout abdomen  Keep patient n.p.o. at this time pending possible procedure with GI

## 2023-10-18 NOTE — ED TRIAGE NOTES
Pt states he has had LUQ and LLQ pain radiationg to L lower back since yesterday. Denies any urinary symptoms.

## 2023-10-18 NOTE — H&P (VIEW-ONLY)
Gastroenterology Consult Note       Primary GI Physician: Cecil Ellis MD    Referring Provider: Hospitalist team    Consult Date:  10/18/2023    Admit Date:  10/18/2023    Chief Complaint: Severe anemia and question of GI bleed. Subjective:     History of Present Illness:  Patient is a 61 y.o. male who is seen in consultation for evaluation for severe anemia and question of GI bleed. The history is from the patient and their medical records. Patient presented emergency room yesterday with dyspnea on exertion and abdominal pain in the left upper and lower quadrant. The abdominal pain did not change with movement or bowel movement and was reproducible on palpation in the left lower quadrant. This is occurred within the last 3 days. He is also noted decrease in his exercise ability and dyspnea on exertion has occurred. The patient came the emergency room he was found to be severely anemic with a hemoglobin/hematocrit of 6.2/21.7, MCV 70.5. His hemoglobin then decreased to 5.8 and has been given 2 units of packed red blood cells. Review of his records shows he has had a gradual decrease in his hemoglobin/hematocrit over the last 2 years from normal.  Platelet count has remained normal during this decrease. He has noticed no gross rectal bleeding but he did say his stool was dark upon admission. He attributed the dark stool to edgar crackers EGD. Stool Hemoccult testing was negative for blood this admission. Pertinent labs show liver function tests are normal except for alkaline phosphatase of 120, albumin 2.5, glucose 196, BUN 19 and creatinine 0.86 normal electrolytes. The patient has had increased bilateral pulmonary nodules over the last 2 to 3 years. It is unclear if this etiology is infectious, metastatic disease or primary lung cancer.   He is scheduled be seen by pulmonologist.  Review of his records shows he has had the following number of CT scans: Abdomen and pelvis-4, chest-3,

## 2023-10-18 NOTE — PROGRESS NOTES
0645-Beside shift report received from offgoing nurse. Assumed care of patient. PRBC infusing at this time. No distress noted. VSS. CBWR    0813-AM medications given- Education Provided. PT tolerated well. 0750-PRBC complete. VSS. Pt tolerated well.    0900-Dr. Kasper rounding at this time    1000-Pt Hgb 6.2. Notified MD. Surya Chapman 1 unit of PRBC per MD. Adelita Pena.       0750-PRBC  infusion complete. VSS. Pt tolerated well. 1020-Second unit of PRBC started per protocol. VSS. Pt tolerating well. 1320-PRBC infusion complete. VSS. Pt tolerated well. 1330-Pt off unit to OR.     1500-Patient returned to unit, no c/o pain. VSS.     1625-Supper tray provided. Family at bedside. CBWR.    1900-Report given to offgoing nurse.

## 2023-10-19 VITALS
HEART RATE: 86 BPM | DIASTOLIC BLOOD PRESSURE: 69 MMHG | HEIGHT: 67 IN | RESPIRATION RATE: 19 BRPM | TEMPERATURE: 98.6 F | OXYGEN SATURATION: 95 % | SYSTOLIC BLOOD PRESSURE: 158 MMHG | BODY MASS INDEX: 33.12 KG/M2 | WEIGHT: 211 LBS

## 2023-10-19 LAB
ANION GAP SERPL CALC-SCNC: 6 MMOL/L (ref 3–18)
BASOPHILS # BLD: 0 K/UL (ref 0–0.1)
BASOPHILS NFR BLD: 0 % (ref 0–2)
BUN SERPL-MCNC: 13 MG/DL (ref 7–18)
BUN/CREAT SERPL: 16 (ref 12–20)
CA-I BLD-MCNC: 8.5 MG/DL (ref 8.5–10.1)
CHLORIDE SERPL-SCNC: 110 MMOL/L (ref 100–111)
CO2 SERPL-SCNC: 27 MMOL/L (ref 21–32)
CREAT SERPL-MCNC: 0.79 MG/DL (ref 0.6–1.3)
DIFFERENTIAL METHOD BLD: ABNORMAL
EOSINOPHIL # BLD: 0.2 K/UL (ref 0–0.4)
EOSINOPHIL NFR BLD: 2 % (ref 0–5)
ERYTHROCYTE [DISTWIDTH] IN BLOOD BY AUTOMATED COUNT: 18.6 % (ref 11.6–14.5)
GLUCOSE BLD STRIP.AUTO-MCNC: 121 MG/DL (ref 70–110)
GLUCOSE SERPL-MCNC: 104 MG/DL (ref 74–99)
HCT VFR BLD AUTO: 25 % (ref 36–48)
HGB BLD-MCNC: 7.2 G/DL (ref 13–16)
IMM GRANULOCYTES # BLD AUTO: 0 K/UL
IMM GRANULOCYTES NFR BLD AUTO: 0 %
LYMPHOCYTES # BLD: 2.5 K/UL (ref 0.9–3.6)
LYMPHOCYTES NFR BLD: 23 % (ref 21–52)
MAGNESIUM SERPL-MCNC: 2 MG/DL (ref 1.6–2.6)
MCH RBC QN AUTO: 21.3 PG (ref 24–34)
MCHC RBC AUTO-ENTMCNC: 28.8 G/DL (ref 31–37)
MCV RBC AUTO: 74 FL (ref 78–100)
MONOCYTES # BLD: 1.4 K/UL (ref 0.05–1.2)
MONOCYTES NFR BLD: 13 % (ref 3–10)
NEUTS SEG # BLD: 6.9 K/UL (ref 1.8–8)
NEUTS SEG NFR BLD: 62 % (ref 40–73)
NRBC # BLD: 0.03 K/UL (ref 0–0.01)
NRBC BLD-RTO: 0.3 PER 100 WBC
PERFORMED BY:: ABNORMAL
PHOSPHATE SERPL-MCNC: 3.4 MG/DL (ref 2.5–4.9)
PLATELET # BLD AUTO: 453 K/UL (ref 135–420)
PLATELET COMMENT: ABNORMAL
PMV BLD AUTO: 9 FL (ref 9.2–11.8)
POTASSIUM SERPL-SCNC: 3.6 MMOL/L (ref 3.5–5.5)
RBC # BLD AUTO: 3.38 M/UL (ref 4.35–5.65)
RBC MORPH BLD: ABNORMAL
SODIUM SERPL-SCNC: 143 MMOL/L (ref 136–145)
WBC # BLD AUTO: 11 K/UL (ref 4.6–13.2)

## 2023-10-19 PROCEDURE — 85025 COMPLETE CBC W/AUTO DIFF WBC: CPT

## 2023-10-19 PROCEDURE — 84100 ASSAY OF PHOSPHORUS: CPT

## 2023-10-19 PROCEDURE — 96365 THER/PROPH/DIAG IV INF INIT: CPT

## 2023-10-19 PROCEDURE — 2580000003 HC RX 258: Performed by: INTERNAL MEDICINE

## 2023-10-19 PROCEDURE — 6370000000 HC RX 637 (ALT 250 FOR IP): Performed by: INTERNAL MEDICINE

## 2023-10-19 PROCEDURE — 6360000002 HC RX W HCPCS: Performed by: INTERNAL MEDICINE

## 2023-10-19 PROCEDURE — 83735 ASSAY OF MAGNESIUM: CPT

## 2023-10-19 PROCEDURE — C9113 INJ PANTOPRAZOLE SODIUM, VIA: HCPCS | Performed by: INTERNAL MEDICINE

## 2023-10-19 PROCEDURE — 80048 BASIC METABOLIC PNL TOTAL CA: CPT

## 2023-10-19 PROCEDURE — 82962 GLUCOSE BLOOD TEST: CPT

## 2023-10-19 PROCEDURE — 36415 COLL VENOUS BLD VENIPUNCTURE: CPT

## 2023-10-19 PROCEDURE — G0378 HOSPITAL OBSERVATION PER HR: HCPCS

## 2023-10-19 PROCEDURE — 96376 TX/PRO/DX INJ SAME DRUG ADON: CPT

## 2023-10-19 RX ORDER — FERROUS SULFATE 325(65) MG
325 TABLET ORAL 2 TIMES DAILY
Qty: 180 TABLET | Refills: 1 | Status: SHIPPED | OUTPATIENT
Start: 2023-10-19

## 2023-10-19 RX ORDER — PANTOPRAZOLE SODIUM 40 MG/1
40 TABLET, DELAYED RELEASE ORAL
Qty: 60 TABLET | Refills: 5 | Status: SHIPPED | OUTPATIENT
Start: 2023-10-19

## 2023-10-19 RX ADMIN — LISINOPRIL 40 MG: 20 TABLET ORAL at 09:21

## 2023-10-19 RX ADMIN — SODIUM CHLORIDE, PRESERVATIVE FREE 10 ML: 5 INJECTION INTRAVENOUS at 10:46

## 2023-10-19 RX ADMIN — ACETAMINOPHEN 650 MG: 325 TABLET ORAL at 04:23

## 2023-10-19 RX ADMIN — TAMSULOSIN HYDROCHLORIDE 0.4 MG: 0.4 CAPSULE ORAL at 09:21

## 2023-10-19 RX ADMIN — SODIUM CHLORIDE 125 MG: 9 INJECTION, SOLUTION INTRAVENOUS at 09:21

## 2023-10-19 RX ADMIN — PANTOPRAZOLE SODIUM 40 MG: 40 INJECTION, POWDER, FOR SOLUTION INTRAVENOUS at 09:21

## 2023-10-19 RX ADMIN — AMLODIPINE BESYLATE 10 MG: 5 TABLET ORAL at 09:21

## 2023-10-19 RX ADMIN — CARVEDILOL 12.5 MG: 12.5 TABLET, FILM COATED ORAL at 09:21

## 2023-10-19 ASSESSMENT — PAIN SCALES - GENERAL
PAINLEVEL_OUTOF10: 0
PAINLEVEL_OUTOF10: 3
PAINLEVEL_OUTOF10: 0

## 2023-10-19 ASSESSMENT — PAIN SCALES - WONG BAKER: WONGBAKER_NUMERICALRESPONSE: 0

## 2023-10-19 ASSESSMENT — PAIN DESCRIPTION - LOCATION: LOCATION: ABDOMEN

## 2023-10-19 ASSESSMENT — PAIN DESCRIPTION - DESCRIPTORS: DESCRIPTORS: ACHING

## 2023-10-19 NOTE — PROGRESS NOTES
Bedside shift change report given to TROY Gomes RN (oncoming nurse) by Nino Simpson RN (offgoing nurse). Report included the following information SBAR, Intake/Output, MAR, Recent Results, Med Rec Status, and Quality Measures. 1930  Shift assessment completed. Pt A&Ox4 and denies pain but on occasion has LLQ pain when he moves. VSS.     2100  HS medication administered without difficulty. Pt OOB to the bathroom to urinate. Gait is steady. 2232  Pt sitting up on the side of the bed and coughing. Pt stated that when he coughs, his LLQ aches and he gets nauseous. Tylenol and Zofran administered. 2355  Provider in unit and discussed pt's pain in LLQ when coughing. Provider will review his medications for pain. 0400  Phlebotomy in to draw blood for AM labs. Pt tolerated well.     0645  Bedside shift change report given to BRIDGER Smiley RN (oncoming nurse) by TROY Gomes RN (offgoing nurse). Report included the following information SBAR, Intake/Output, MAR, Recent Results, Med Rec Status, and Quality Measures.

## 2023-10-19 NOTE — DISCHARGE SUMMARY
Discharge Summary       PATIENT ID: Parth Wheeler  MRN: [id number]   YOB: 1960    DATE OF ADMISSION: 10/18/2023  1:29 AM    DATE OF DISCHARGE: 10/19/23    PRIMARY CARE PROVIDER: CHRIS Oliver NP     ATTENDING PHYSICIAN: Negin Bear MD  DISCHARGING PROVIDER: Negin Bear MD        CONSULTATIONS: IP CONSULT TO GI    PROCEDURES/SURGERIES: Procedure(s):  EGD (ICU 5)    [address] COURSE:   Parth Wheeler is a 61 y.o. male  has a past medical history of Acute respiratory failure with hypoxia ([address]), COVID-19, Diabetes mellitus ([address]), Hypertension, Hypertensive urgency, Pulmonary nodules, and Sepsis ([address]). Patient stated bedside emergency department. Patient came to the emergency room tonight for abdominal pain. Patient states his abdominal pain has been going on about a month and he has seen his primary care doctor and they have not given any answers as to why he is got abdominal pain. Patient states he was also [facility] with no answers for the same. Patient denies any bloody stool although he states he is stool for last couple days has been dark and formed. No nausea vomiting or diarrhea. Patient states the abdominal pain is mainly on the left side upper and lower radiates to the left flank. Patient has had no problems eating. Patient does state for the last week or so he has felt weak and feeling a little woozy. Patient states he is going to be worked up soon for some possible masses in his lungs and will see a pulmonologist.  No upper respiratory symptoms reported tonight no cough or cold no chest pain or shortness of breath. Patient will be admitted on observation status he will be given 2 units of PRBCs hemoglobin hematocrit checked 1 hour after PRBCs completed. GI consultation also placed given the symptomatic anemia and possible dark stool.   Patient will be kept n.p.o. pending GI consult and case Dr. Aliyah Benoit wants to do the procedure. Patient is on disability due to back injury has a stimulator in his right hip, is an everyday smoker, a full code. Reviewed overnight admission with nurse practitioner patient also seen and examined by myself. Patient had dark to black stools on Saturday patient developed abdominal pain on Monday which never improved patient started having decrease in his exercise ability and dyspnea exertion for last few weeks now. Patient takes a stool was dark due to some sort of edgar crackers. Patient denies ever seeing bright red blood either by vomiting or per rectum. Patient does not think he had black stool earlier in the month either. Patient's pain is in the left lower quadrant and it is reproducible by palpation at this time. Reviewed CAT scan without contrast and showed no abnormality. Reviewed with patient that I was worried about his pulmonary nodules but that CAT scan recheck last month showed that they did not grow from earlier in the year. In fact the lymphadenopathy had improved. Patient thinks he had a colonoscopy in the last years that was within normal limits. Patient denies weight loss at this time infected gaining some weight. Although last year he did lose some weight he went down from 2 26-2 09 and now is gaining some without I wonder if this is on the same time he had severe pneumonia. I told him our plan would be to wait to see if GI had any input if they want to do any procedures on him based on his findings interestingly his stool was heme-negative though still the top of my differential was a recent GI bleed. If GI does not feel any further procedures are necessary my goal and plan is to transfuse him to hemoglobin above 7 I have added iron studies onto his labs hopefully these can be done on the blood work that was done prior to his transfusion. After this procedure if he still below 7 will give another unit if he is above 7 we will let him discharge.     Pt also admit to heavy ibuprofen use. DISCHARGE DIAGNOSES / PLAN:      Upper gi bleed  Iron def anemia  Blood loss anemia  GERD  Gastritis, duodenitis, PUD  - sp egd see results below  - hgb up to 7.2 after 2 units prbc  - iron studies consistent with iron def  - agrees to iv iron prior ot dc, we discussed pro's and con's of an additonal transfusion, no further tranfusions at this time  - has follow up with GI for further procedures and follow up  - will dc on ppi and iron  - no more nsaids      DISCHARGE MEDICATIONS:  Current Discharge Medication List        START taking these medications    Details   pantoprazole (PROTONIX) 40 MG tablet Take 1 tablet by mouth 2 times daily (before meals)  Qty: 60 tablet, Refills: 5      ferrous sulfate (IRON 325) 325 (65 Fe) MG tablet Take 1 tablet by mouth 2 times daily  Qty: 180 tablet, Refills: 1           CONTINUE these medications which have NOT CHANGED    Details   sildenafil (REVATIO) 20 MG tablet Take 1 tablet by mouth daily as needed      tamsulosin (FLOMAX) 0.4 MG capsule Take 1 capsule by mouth daily      amLODIPine (NORVASC) 10 MG tablet Take 1 tablet by mouth daily      carvedilol (COREG) 12.5 MG tablet Take 1 tablet by mouth 2 times daily      furosemide (LASIX) 20 MG tablet Take 1 tablet by mouth daily      lisinopril (PRINIVIL;ZESTRIL) 40 MG tablet Take 1 tablet by mouth daily      metFORMIN (GLUCOPHAGE) 1000 MG tablet Take 1 tablet by mouth 2 times daily           STOP taking these medications       Chlorpheniramine-DM (CORICIDIN COUGH/COLD) 4-30 MG TABS Comments:   Reason for Stopping:         piperacillin-tazobactam (ZOSYN) 40.5 (36-4.5) g injection Comments:   Reason for Stopping:         potassium chloride (KLOR-CON M) 20 MEQ extended release tablet Comments:   Reason for Stopping:                 NOTIFY YOUR PHYSICIAN FOR ANY OF THE FOLLOWING:   Fever over 101 degrees for 24 hours.    Chest pain, shortness of breath, fever, chills, nausea, vomiting, diarrhea, change in

## 2023-10-19 NOTE — PLAN OF CARE
Problem: Safety - Adult  Goal: Free from fall injury  10/19/2023 1141 by Miya Sánchez RN  Outcome: Adequate for Discharge  10/19/2023 1009 by Miya Sánchez RN  Outcome: Adequate for Discharge  10/19/2023 1007 by Miya Sánchez RN  Outcome: Adequate for Discharge  10/19/2023 0508 by Debra Stone RN  Outcome: Progressing     Problem: ABCDS Injury Assessment  Goal: Absence of physical injury  10/19/2023 1141 by Miya Sánchez RN  Outcome: Adequate for Discharge  10/19/2023 1009 by Miya Sánchez RN  Outcome: Adequate for Discharge  10/19/2023 1007 by Miya Sánchez RN  Outcome: Adequate for Discharge  10/19/2023 0508 by Debra Stone RN  Outcome: Progressing     Problem: Hematologic - Adult  Goal: Maintains hematologic stability  10/19/2023 1141 by Miya Sánchez RN  Outcome: Adequate for Discharge  10/19/2023 1009 by Miya Sánchez RN  Outcome: Adequate for Discharge  10/19/2023 1007 by Miya Sánchez RN  Outcome: Adequate for Discharge  10/19/2023 0508 by Debra Stone RN  Outcome: Progressing     Problem: Chronic Conditions and Co-morbidities  Goal: Patient's chronic conditions and co-morbidity symptoms are monitored and maintained or improved  10/19/2023 1141 by Miya Sánchez RN  Outcome: Adequate for Discharge  10/19/2023 1009 by Miya Sánchez RN  Outcome: Adequate for Discharge  10/19/2023 1007 by Miya Sánchez RN  Outcome: Adequate for Discharge  10/19/2023 0508 by Debra Stone RN  Outcome: Progressing     Problem: Discharge Planning  Goal: Discharge to home or other facility with appropriate resources  10/19/2023 1141 by Miya Sánchez RN  Outcome: Adequate for Discharge  10/19/2023 1009 by Miya Sánchez RN  Outcome: Adequate for Discharge  10/19/2023 1007 by Miya Sánchez RN  Outcome: Adequate for Discharge  10/19/2023 0298 by Debra Stone RN  Outcome: Progressing     Problem: Pain  Goal: Verbalizes/displays adequate comfort level or baseline comfort level  10/19/2023 1141 by Neville Doherty RN  Outcome: Adequate for Discharge  10/19/2023 1009 by Neville Doherty RN  Outcome: Adequate for Discharge  10/19/2023 1007 by Neville Doherty RN  Outcome: Adequate for Discharge  10/19/2023 0508 by Jameson Garcia RN  Outcome: Progressing

## 2023-10-19 NOTE — PLAN OF CARE
Problem: Safety - Adult  Goal: Free from fall injury  10/19/2023 1007 by Escobar Chu RN  Outcome: Adequate for Discharge  10/19/2023 0508 by Rodrigo Gonzales RN  Outcome: Progressing     Problem: ABCDS Injury Assessment  Goal: Absence of physical injury  10/19/2023 1007 by Escobar Chu RN  Outcome: Adequate for Discharge  10/19/2023 0508 by Rodrigo Gonzales RN  Outcome: Progressing     Problem: Hematologic - Adult  Goal: Maintains hematologic stability  10/19/2023 1007 by Escobar Chu RN  Outcome: Adequate for Discharge  10/19/2023 0508 by Rodrigo Gonzales RN  Outcome: Progressing     Problem: Chronic Conditions and Co-morbidities  Goal: Patient's chronic conditions and co-morbidity symptoms are monitored and maintained or improved  10/19/2023 1007 by Escobar Chu RN  Outcome: Adequate for Discharge  10/19/2023 0508 by Rodrigo Gonzales RN  Outcome: Progressing     Problem: Discharge Planning  Goal: Discharge to home or other facility with appropriate resources  10/19/2023 1007 by Escobar Chu RN  Outcome: Adequate for Discharge  10/19/2023 0508 by Rodrigo Gonazles RN  Outcome: Progressing     Problem: Pain  Goal: Verbalizes/displays adequate comfort level or baseline comfort level  10/19/2023 1007 by Escobar Chu RN  Outcome: Adequate for Discharge  10/19/2023 0508 by Rodrigo Gonzales RN  Outcome: Progressing

## 2023-10-19 NOTE — PLAN OF CARE
Problem: Safety - Adult  Goal: Free from fall injury  10/19/2023 1009 by Sheyla Dunaway RN  Outcome: Adequate for Discharge  10/19/2023 1007 by Sheyla Dunaway RN  Outcome: Adequate for Discharge  10/19/2023 0508 by Maximiliano Aguero RN  Outcome: Progressing     Problem: ABCDS Injury Assessment  Goal: Absence of physical injury  10/19/2023 1009 by Sheyla Dunaway RN  Outcome: Adequate for Discharge  10/19/2023 1007 by Sheyla Dunaway RN  Outcome: Adequate for Discharge  10/19/2023 0508 by Maximiliano Aguero RN  Outcome: Progressing     Problem: Hematologic - Adult  Goal: Maintains hematologic stability  10/19/2023 1009 by Sheyla Dunaway RN  Outcome: Adequate for Discharge  10/19/2023 1007 by Sheyla Dunaway RN  Outcome: Adequate for Discharge  10/19/2023 0508 by Maximiliano Aguero RN  Outcome: Progressing     Problem: Chronic Conditions and Co-morbidities  Goal: Patient's chronic conditions and co-morbidity symptoms are monitored and maintained or improved  10/19/2023 1009 by Sheyla Dunaway RN  Outcome: Adequate for Discharge  10/19/2023 1007 by Sheyla Dunaway RN  Outcome: Adequate for Discharge  10/19/2023 0508 by Maximiliano Aguero RN  Outcome: Progressing     Problem: Discharge Planning  Goal: Discharge to home or other facility with appropriate resources  10/19/2023 1009 by Sheyla Dunaway RN  Outcome: Adequate for Discharge  10/19/2023 1007 by Sheyla Dunaway RN  Outcome: Adequate for Discharge  10/19/2023 0508 by Maximiliano Aguero RN  Outcome: Progressing     Problem: Pain  Goal: Verbalizes/displays adequate comfort level or baseline comfort level  10/19/2023 1009 by Sheyla Dunaway RN  Outcome: Adequate for Discharge  10/19/2023 1007 by Sheyla Dunaway RN  Outcome: Adequate for Discharge  10/19/2023 0508 by Maximiliano Aguero RN  Outcome: Progressing

## 2023-10-19 NOTE — PLAN OF CARE
Problem: Safety - Adult  Goal: Free from fall injury  Outcome: Progressing     Problem: ABCDS Injury Assessment  Goal: Absence of physical injury  Outcome: Progressing     Problem: Hematologic - Adult  Goal: Maintains hematologic stability  Outcome: Progressing     Problem: Chronic Conditions and Co-morbidities  Goal: Patient's chronic conditions and co-morbidity symptoms are monitored and maintained or improved  Outcome: Progressing     Problem: Discharge Planning  Goal: Discharge to home or other facility with appropriate resources  Outcome: Progressing     Problem: Pain  Goal: Verbalizes/displays adequate comfort level or baseline comfort level  Outcome: Progressing

## 2023-10-19 NOTE — PROGRESS NOTES
0700-Beside shift report received from off going nurse. Assumed care of patient. Pt AxOx4. No c/o pain. Bed in lowest position. CBWR.      0915-Doctor Rounding at this time    0921-AM medications given- Education Provided. Pt tolerated well. 1015-Pt tolerated regular diet. 1130-Discharge instructions provided. Pt verbalized understanding. IV removed. No complications. Pt tolerated well.

## 2023-10-20 ENCOUNTER — PRE-PROCEDURE TELEPHONE (OUTPATIENT)
Age: 63
End: 2023-10-20

## 2023-10-20 ENCOUNTER — SCHEDULED TELEPHONE ENCOUNTER (OUTPATIENT)
Age: 63
End: 2023-10-20

## 2023-10-20 DIAGNOSIS — Z12.11 SCREEN FOR COLON CANCER: Primary | ICD-10-CM

## 2023-10-20 LAB
ABO + RH BLD: NORMAL
BLD PROD TYP BPU: NORMAL
BLOOD BANK DISPENSE STATUS: NORMAL
BLOOD GROUP ANTIBODIES SERPL: NEGATIVE
BPU ID: NORMAL
CROSSMATCH RESULT: NORMAL
SPECIMEN EXP DATE BLD: NORMAL
TRANSFUSION STATUS PATIENT QL: NORMAL
UNIT DIVISION: 0

## 2023-10-20 NOTE — PROGRESS NOTES
Peg prep given to the patient via telephone, mailed and in person pickup  Procedure -2023, Dx Code z12.11 Patient made aware to hold diabetic medications morning of procedure. Patient verbalized understanding.  Garett Bolanos lpn

## 2023-10-20 NOTE — TELEPHONE ENCOUNTER
Peg prep given to the patient via telephone, mailed, and in person   Procedure 10-, Dx Code z12.11  Patient verbalized understanding. Patient made aware to hold diabetic medication morning of procedure.

## 2023-10-22 ENCOUNTER — APPOINTMENT (OUTPATIENT)
Age: 63
End: 2023-10-22
Payer: MEDICARE

## 2023-10-22 ENCOUNTER — HOSPITAL ENCOUNTER (EMERGENCY)
Age: 63
Discharge: HOME | End: 2023-10-22
Attending: EMERGENCY MEDICINE
Payer: MEDICARE

## 2023-10-22 VITALS
RESPIRATION RATE: 18 BRPM | OXYGEN SATURATION: 96 % | BODY MASS INDEX: 34.53 KG/M2 | DIASTOLIC BLOOD PRESSURE: 102 MMHG | HEIGHT: 67 IN | TEMPERATURE: 98.2 F | HEART RATE: 89 BPM | WEIGHT: 220 LBS | SYSTOLIC BLOOD PRESSURE: 207 MMHG

## 2023-10-22 DIAGNOSIS — J90 PLEURAL EFFUSION, BILATERAL: ICD-10-CM

## 2023-10-22 DIAGNOSIS — E87.6 HYPOKALEMIA: ICD-10-CM

## 2023-10-22 DIAGNOSIS — R10.32 ABDOMINAL PAIN, LEFT LOWER QUADRANT: Primary | ICD-10-CM

## 2023-10-22 DIAGNOSIS — R93.5 ABNORMAL CT OF THE ABDOMEN: ICD-10-CM

## 2023-10-22 LAB
ALBUMIN SERPL-MCNC: 2.5 G/DL (ref 3.4–5)
ALBUMIN/GLOB SERPL: 0.6 (ref 0.8–1.7)
ALP SERPL-CCNC: 100 U/L (ref 45–117)
ALT SERPL-CCNC: 16 U/L (ref 16–61)
ANION GAP SERPL CALC-SCNC: 5 MMOL/L (ref 3–18)
APPEARANCE UR: CLEAR
AST SERPL W P-5'-P-CCNC: 14 U/L (ref 10–38)
BACTERIA URNS QL MICRO: NEGATIVE /HPF
BASOPHILS # BLD: 0.3 K/UL (ref 0–0.1)
BASOPHILS NFR BLD: 2 % (ref 0–2)
BILIRUB SERPL-MCNC: 0.4 MG/DL (ref 0.2–1)
BILIRUB UR QL: NEGATIVE
BUN SERPL-MCNC: 9 MG/DL (ref 7–18)
BUN/CREAT SERPL: 12 (ref 12–20)
CA-I BLD-MCNC: 8.9 MG/DL (ref 8.5–10.1)
CHLORIDE SERPL-SCNC: 107 MMOL/L (ref 100–111)
CO2 SERPL-SCNC: 30 MMOL/L (ref 21–32)
COLOR UR: YELLOW
CREAT SERPL-MCNC: 0.77 MG/DL (ref 0.6–1.3)
DIFFERENTIAL METHOD BLD: ABNORMAL
EOSINOPHIL # BLD: 0 K/UL (ref 0–0.4)
EOSINOPHIL NFR BLD: 0 % (ref 0–5)
EPITH CASTS URNS QL MICRO: ABNORMAL /LPF (ref 0–20)
ERYTHROCYTE [DISTWIDTH] IN BLOOD BY AUTOMATED COUNT: 21.9 % (ref 11.6–14.5)
GLOBULIN SER CALC-MCNC: 4.2 G/DL (ref 2–4)
GLUCOSE SERPL-MCNC: 129 MG/DL (ref 74–99)
GLUCOSE UR STRIP.AUTO-MCNC: NEGATIVE MG/DL
HCT VFR BLD AUTO: 26.6 % (ref 36–48)
HGB BLD-MCNC: 7.9 G/DL (ref 13–16)
HGB UR QL STRIP: NEGATIVE
IMM GRANULOCYTES # BLD AUTO: 0 K/UL
IMM GRANULOCYTES NFR BLD AUTO: 0 %
KETONES UR QL STRIP.AUTO: NEGATIVE MG/DL
LACTATE SERPL-SCNC: 0.8 MMOL/L (ref 0.4–2)
LACTATE SERPL-SCNC: 1 MMOL/L (ref 0.4–2)
LEUKOCYTE ESTERASE UR QL STRIP.AUTO: NEGATIVE
LIPASE SERPL-CCNC: 151 U/L (ref 73–393)
LYMPHOCYTES # BLD: 2.6 K/UL (ref 0.9–3.6)
LYMPHOCYTES NFR BLD: 19 % (ref 21–52)
MAGNESIUM SERPL-MCNC: 1.7 MG/DL (ref 1.6–2.6)
MCH RBC QN AUTO: 22.3 PG (ref 24–34)
MCHC RBC AUTO-ENTMCNC: 29.7 G/DL (ref 31–37)
MCV RBC AUTO: 75.1 FL (ref 78–100)
MONOCYTES # BLD: 0.8 K/UL (ref 0.05–1.2)
MONOCYTES NFR BLD: 6 % (ref 3–10)
NEUTS BAND NFR BLD MANUAL: 2 % (ref 0–5)
NEUTS SEG # BLD: 9.9 K/UL (ref 1.8–8)
NEUTS SEG NFR BLD: 71 % (ref 40–73)
NITRITE UR QL STRIP.AUTO: NEGATIVE
NRBC # BLD: 0 K/UL (ref 0–0.01)
NRBC BLD-RTO: 0 PER 100 WBC
PH UR STRIP: 7 (ref 5–8)
PLATELET # BLD AUTO: 348 K/UL (ref 135–420)
PMV BLD AUTO: 8.9 FL (ref 9.2–11.8)
POTASSIUM SERPL-SCNC: 3.3 MMOL/L (ref 3.5–5.5)
PROCALCITONIN SERPL-MCNC: <0.05 NG/ML
PROT SERPL-MCNC: 6.7 G/DL (ref 6.4–8.2)
PROT UR STRIP-MCNC: 100 MG/DL
RBC # BLD AUTO: 3.54 M/UL (ref 4.35–5.65)
RBC #/AREA URNS HPF: ABNORMAL /HPF (ref 0–2)
RBC MORPH BLD: ABNORMAL
SODIUM SERPL-SCNC: 142 MMOL/L (ref 136–145)
SP GR UR REFRACTOMETRY: 1.01 (ref 1–1.03)
UROBILINOGEN UR QL STRIP.AUTO: 1 EU/DL (ref 0.2–1)
WBC # BLD AUTO: 13.6 K/UL (ref 4.6–13.2)
WBC URNS QL MICRO: ABNORMAL /HPF (ref 0–4)

## 2023-10-22 PROCEDURE — 85025 COMPLETE CBC W/AUTO DIFF WBC: CPT

## 2023-10-22 PROCEDURE — 83735 ASSAY OF MAGNESIUM: CPT

## 2023-10-22 PROCEDURE — 36415 COLL VENOUS BLD VENIPUNCTURE: CPT

## 2023-10-22 PROCEDURE — 96361 HYDRATE IV INFUSION ADD-ON: CPT

## 2023-10-22 PROCEDURE — 99285 EMERGENCY DEPT VISIT HI MDM: CPT

## 2023-10-22 PROCEDURE — 80053 COMPREHEN METABOLIC PANEL: CPT

## 2023-10-22 PROCEDURE — 84145 PROCALCITONIN (PCT): CPT

## 2023-10-22 PROCEDURE — 6360000002 HC RX W HCPCS: Performed by: EMERGENCY MEDICINE

## 2023-10-22 PROCEDURE — 2580000003 HC RX 258: Performed by: EMERGENCY MEDICINE

## 2023-10-22 PROCEDURE — 82164 ANGIOTENSIN I ENZYME TEST: CPT

## 2023-10-22 PROCEDURE — 87040 BLOOD CULTURE FOR BACTERIA: CPT

## 2023-10-22 PROCEDURE — 83690 ASSAY OF LIPASE: CPT

## 2023-10-22 PROCEDURE — 74022 RADEX COMPL AQT ABD SERIES: CPT

## 2023-10-22 PROCEDURE — 81001 URINALYSIS AUTO W/SCOPE: CPT

## 2023-10-22 PROCEDURE — 96374 THER/PROPH/DIAG INJ IV PUSH: CPT

## 2023-10-22 PROCEDURE — 83605 ASSAY OF LACTIC ACID: CPT

## 2023-10-22 PROCEDURE — C9113 INJ PANTOPRAZOLE SODIUM, VIA: HCPCS | Performed by: EMERGENCY MEDICINE

## 2023-10-22 PROCEDURE — 96375 TX/PRO/DX INJ NEW DRUG ADDON: CPT

## 2023-10-22 PROCEDURE — 74177 CT ABD & PELVIS W/CONTRAST: CPT

## 2023-10-22 PROCEDURE — 6370000000 HC RX 637 (ALT 250 FOR IP): Performed by: EMERGENCY MEDICINE

## 2023-10-22 PROCEDURE — 6360000004 HC RX CONTRAST MEDICATION: Performed by: EMERGENCY MEDICINE

## 2023-10-22 RX ORDER — HYDROCODONE BITARTRATE AND ACETAMINOPHEN 5; 325 MG/1; MG/1
1 TABLET ORAL
Status: COMPLETED | OUTPATIENT
Start: 2023-10-22 | End: 2023-10-22

## 2023-10-22 RX ORDER — POTASSIUM CHLORIDE 20 MEQ/1
20 TABLET, EXTENDED RELEASE ORAL DAILY
Qty: 3 TABLET | Refills: 0 | Status: SHIPPED | OUTPATIENT
Start: 2023-10-22 | End: 2023-10-25

## 2023-10-22 RX ORDER — MORPHINE SULFATE 2 MG/ML
2 INJECTION, SOLUTION INTRAMUSCULAR; INTRAVENOUS
Status: COMPLETED | OUTPATIENT
Start: 2023-10-22 | End: 2023-10-22

## 2023-10-22 RX ORDER — 0.9 % SODIUM CHLORIDE 0.9 %
500 INTRAVENOUS SOLUTION INTRAVENOUS ONCE
Status: COMPLETED | OUTPATIENT
Start: 2023-10-22 | End: 2023-10-22

## 2023-10-22 RX ORDER — ONDANSETRON 4 MG/1
4 TABLET, FILM COATED ORAL 3 TIMES DAILY PRN
Qty: 15 TABLET | Refills: 0 | Status: SHIPPED | OUTPATIENT
Start: 2023-10-22

## 2023-10-22 RX ORDER — HYDROCODONE BITARTRATE AND ACETAMINOPHEN 5; 325 MG/1; MG/1
1 TABLET ORAL EVERY 6 HOURS PRN
Qty: 5 TABLET | Refills: 0 | Status: SHIPPED | OUTPATIENT
Start: 2023-10-22 | End: 2023-10-24

## 2023-10-22 RX ORDER — SODIUM CHLORIDE, SODIUM LACTATE, POTASSIUM CHLORIDE, AND CALCIUM CHLORIDE .6; .31; .03; .02 G/100ML; G/100ML; G/100ML; G/100ML
1000 INJECTION, SOLUTION INTRAVENOUS ONCE
Status: COMPLETED | OUTPATIENT
Start: 2023-10-22 | End: 2023-10-22

## 2023-10-22 RX ORDER — PANTOPRAZOLE SODIUM 40 MG/10ML
40 INJECTION, POWDER, LYOPHILIZED, FOR SOLUTION INTRAVENOUS ONCE
Status: COMPLETED | OUTPATIENT
Start: 2023-10-22 | End: 2023-10-22

## 2023-10-22 RX ADMIN — MORPHINE SULFATE 2 MG: 2 INJECTION, SOLUTION INTRAMUSCULAR; INTRAVENOUS at 06:41

## 2023-10-22 RX ADMIN — IOPAMIDOL 100 ML: 755 INJECTION, SOLUTION INTRAVENOUS at 07:47

## 2023-10-22 RX ADMIN — SODIUM CHLORIDE, POTASSIUM CHLORIDE, SODIUM LACTATE AND CALCIUM CHLORIDE 1000 ML: 600; 310; 30; 20 INJECTION, SOLUTION INTRAVENOUS at 07:40

## 2023-10-22 RX ADMIN — HYDROCODONE BITARTRATE AND ACETAMINOPHEN 1 TABLET: 5; 325 TABLET ORAL at 09:04

## 2023-10-22 RX ADMIN — SODIUM CHLORIDE 500 ML: 9 INJECTION, SOLUTION INTRAVENOUS at 06:41

## 2023-10-22 RX ADMIN — CEFTRIAXONE SODIUM 1000 MG: 1 INJECTION, POWDER, FOR SOLUTION INTRAMUSCULAR; INTRAVENOUS at 07:58

## 2023-10-22 RX ADMIN — PANTOPRAZOLE SODIUM 40 MG: 40 INJECTION, POWDER, FOR SOLUTION INTRAVENOUS at 06:41

## 2023-10-22 ASSESSMENT — PAIN DESCRIPTION - LOCATION
LOCATION: ABDOMEN
LOCATION: ABDOMEN;FLANK

## 2023-10-22 ASSESSMENT — ENCOUNTER SYMPTOMS
ABDOMINAL PAIN: 1
RESPIRATORY NEGATIVE: 1

## 2023-10-22 ASSESSMENT — PAIN - FUNCTIONAL ASSESSMENT: PAIN_FUNCTIONAL_ASSESSMENT: 0-10

## 2023-10-22 ASSESSMENT — PAIN SCALES - GENERAL
PAINLEVEL_OUTOF10: 10
PAINLEVEL_OUTOF10: 8

## 2023-10-22 ASSESSMENT — PAIN DESCRIPTION - PAIN TYPE: TYPE: ACUTE PAIN

## 2023-10-22 ASSESSMENT — LIFESTYLE VARIABLES
HOW MANY STANDARD DRINKS CONTAINING ALCOHOL DO YOU HAVE ON A TYPICAL DAY: 1 OR 2
HOW OFTEN DO YOU HAVE A DRINK CONTAINING ALCOHOL: 2-4 TIMES A MONTH

## 2023-10-22 ASSESSMENT — PAIN DESCRIPTION - DESCRIPTORS: DESCRIPTORS: BURNING

## 2023-10-22 ASSESSMENT — PAIN DESCRIPTION - ORIENTATION
ORIENTATION: LEFT
ORIENTATION: LEFT

## 2023-10-22 NOTE — DISCHARGE INSTRUCTIONS
You do not have any acute findings for the left lower quadrant abdominal pain. Keep your appointment with your GI doctor for the colonoscopy next week. I have prescribed a short course of pain medication as well as Zofran for any nausea. Additionally you have a mildly low potassium for which I have prescribed a daily supplement for the next 3 days. You should follow-up with your primary care provider for repeat outpatient testing. You have 2 incidental findings on the CT scan of your abdomen: Trace pleural effusion which is a small amount of fluid outside of both lungs as well as some small nodules that we will need follow-up with your primary care provider. Have a mildly elevated white blood cell count for which we have not found an active infection and could be related to the stress of the left lower quadrant abdominal pain. If your blood cultures are positive you will be contacted for reevaluation.

## 2023-10-22 NOTE — ED NOTES
Lab on unit to draw blood, make aware pt is in [facility], [address] [place] [address]  10/22/23 0843

## 2023-10-22 NOTE — ED TRIAGE NOTES
Patient reports ULQ pain that radiates into his back. Patient states that he has been having this pain on and off for the past 10 months, but that the doctors kept telling him nothing was wrong. Patient reports that he got some relief from pain while he was in the hospital and received morphine, but that the pain came back.

## 2023-10-22 NOTE — ED NOTES
8053- code sepsis called overhead    0504- lab aware of code sepsis and blood cultures due       Linh Wang RN  10/22/23 3872

## 2023-10-22 NOTE — ED PROVIDER NOTES
[facility] EMERGENCY DEPT  EMERGENCY DEPARTMENT HISTORY AND PHYSICAL EXAM        Date: 10/22/2023  Patient Name: Dolores Jarvis  MRN: [id number]  [address] [facility] 1960  Date of evaluation: 10/22/2023  Provider: Prakash Cline MD   Note Started: 8:49 AM 10/22/23    HISTORY OF PRESENT ILLNESS   No chief complaint on file. History Provided By: Patient    HPI: Dolores Jarvis, 61 y.o. male PMHx DM, CoVID, HTN, Pulmonary Nodules, duodenal ulcers, erosive gastritis, chronic left side abdominal pain, resp failure presents with left sided abdominal pain. He states it started January 2023. It has been constant and waxes and wanes. He states he has been seen by several doctors and nothing was found. In fact, he has duodenal ulcers and erosive gastritis on EGD, Asymptomatic anemia and pulmonary nodules. He denies NVD, fever, chills, SOB. He denies BRBPR, Melena and hematemesis. His symptoms are acute on chronic and moderately severe. Patient is a 58-year-old male with multiple medical problems who presents for delayed evaluation of acute on chronic left lower quadrant abdominal pain for the last several days. Gradual onset, atraumatic, nonradiating. No alleviating factors attempted. No clear aggravating factors, systemic symptoms, food or drug association. He has a pending GI appointment for a lower colonoscopy next week for these persistent symptoms since January 2023. The history is provided by the patient, the spouse and medical records.          PAST MEDICAL HISTORY   Past Medical History:  Past Medical History:   Diagnosis Date    Acute respiratory failure with hypoxia ([address])     COVID-19 10/2022    Diabetes mellitus ([address])     Hypertension     Hypertensive urgency     Pulmonary nodules     Sepsis ([address])        Past Surgical History:  Past Surgical History:   Procedure Laterality Date    BACK SURGERY      CHOLECYSTECTOMY      UPPER GASTROINTESTINAL ENDOSCOPY N/A 10/18/2023    EGD (ICU 5) performed by Jose

## 2023-10-23 DIAGNOSIS — Z12.11 SCREEN FOR COLON CANCER: Primary | ICD-10-CM

## 2023-10-23 RX ORDER — POLYETHYLENE GLYCOL 3350, SODIUM SULFATE ANHYDROUS, SODIUM BICARBONATE, SODIUM CHLORIDE, POTASSIUM CHLORIDE 236; 22.74; 6.74; 5.86; 2.97 G/4L; G/4L; G/4L; G/4L; G/4L
4 POWDER, FOR SOLUTION ORAL ONCE
Qty: 4000 ML | Refills: 0 | Status: SHIPPED | OUTPATIENT
Start: 2023-10-23 | End: 2023-10-23 | Stop reason: SDUPTHER

## 2023-10-23 RX ORDER — POLYETHYLENE GLYCOL 3350, SODIUM SULFATE ANHYDROUS, SODIUM BICARBONATE, SODIUM CHLORIDE, POTASSIUM CHLORIDE 236; 22.74; 6.74; 5.86; 2.97 G/4L; G/4L; G/4L; G/4L; G/4L
4 POWDER, FOR SOLUTION ORAL ONCE
Qty: 4000 ML | Refills: 0 | Status: SHIPPED | OUTPATIENT
Start: 2023-10-23 | End: 2023-10-23

## 2023-10-24 ENCOUNTER — ANESTHESIA EVENT (OUTPATIENT)
Age: 63
End: 2023-10-24
Payer: MEDICARE

## 2023-10-25 ENCOUNTER — HOSPITAL ENCOUNTER (OUTPATIENT)
Age: 63
Setting detail: OUTPATIENT SURGERY
Discharge: HOME | End: 2023-10-25
Attending: INTERNAL MEDICINE | Admitting: INTERNAL MEDICINE
Payer: MEDICARE

## 2023-10-25 ENCOUNTER — ANESTHESIA (OUTPATIENT)
Age: 63
End: 2023-10-25
Payer: MEDICARE

## 2023-10-25 VITALS
OXYGEN SATURATION: 99 % | SYSTOLIC BLOOD PRESSURE: 197 MMHG | RESPIRATION RATE: 18 BRPM | HEART RATE: 95 BPM | DIASTOLIC BLOOD PRESSURE: 89 MMHG | TEMPERATURE: 97 F

## 2023-10-25 DIAGNOSIS — D64.9 SYMPTOMATIC ANEMIA: ICD-10-CM

## 2023-10-25 LAB
ACE SERPL-CCNC: 11 U/L (ref 14–82)
GLUCOSE BLD STRIP.AUTO-MCNC: 103 MG/DL (ref 70–110)
PERFORMED BY:: NORMAL

## 2023-10-25 PROCEDURE — 88305 TISSUE EXAM BY PATHOLOGIST: CPT

## 2023-10-25 PROCEDURE — 2580000003 HC RX 258

## 2023-10-25 PROCEDURE — 6360000002 HC RX W HCPCS: Performed by: NURSE ANESTHETIST, CERTIFIED REGISTERED

## 2023-10-25 PROCEDURE — 3700000001 HC ADD 15 MINUTES (ANESTHESIA): Performed by: INTERNAL MEDICINE

## 2023-10-25 PROCEDURE — 7100000010 HC PHASE II RECOVERY - FIRST 15 MIN: Performed by: INTERNAL MEDICINE

## 2023-10-25 PROCEDURE — 82962 GLUCOSE BLOOD TEST: CPT

## 2023-10-25 PROCEDURE — 3600007502: Performed by: INTERNAL MEDICINE

## 2023-10-25 PROCEDURE — 3600007512: Performed by: INTERNAL MEDICINE

## 2023-10-25 PROCEDURE — 45380 COLONOSCOPY AND BIOPSY: CPT | Performed by: INTERNAL MEDICINE

## 2023-10-25 PROCEDURE — 3700000000 HC ANESTHESIA ATTENDED CARE: Performed by: INTERNAL MEDICINE

## 2023-10-25 PROCEDURE — 2709999900 HC NON-CHARGEABLE SUPPLY: Performed by: INTERNAL MEDICINE

## 2023-10-25 PROCEDURE — 7100000011 HC PHASE II RECOVERY - ADDTL 15 MIN: Performed by: INTERNAL MEDICINE

## 2023-10-25 RX ORDER — SODIUM CHLORIDE 9 MG/ML
INJECTION, SOLUTION INTRAVENOUS PRN
Status: DISCONTINUED | OUTPATIENT
Start: 2023-10-25 | End: 2023-10-25 | Stop reason: HOSPADM

## 2023-10-25 RX ORDER — SODIUM CHLORIDE 0.9 % (FLUSH) 0.9 %
5-40 SYRINGE (ML) INJECTION EVERY 12 HOURS SCHEDULED
Status: DISCONTINUED | OUTPATIENT
Start: 2023-10-25 | End: 2023-10-25 | Stop reason: HOSPADM

## 2023-10-25 RX ORDER — PROPOFOL 10 MG/ML
INJECTION, EMULSION INTRAVENOUS PRN
Status: DISCONTINUED | OUTPATIENT
Start: 2023-10-25 | End: 2023-10-25 | Stop reason: SDUPTHER

## 2023-10-25 RX ORDER — SODIUM CHLORIDE, SODIUM LACTATE, POTASSIUM CHLORIDE, CALCIUM CHLORIDE 600; 310; 30; 20 MG/100ML; MG/100ML; MG/100ML; MG/100ML
INJECTION, SOLUTION INTRAVENOUS CONTINUOUS
Status: DISCONTINUED | OUTPATIENT
Start: 2023-10-25 | End: 2023-10-25 | Stop reason: HOSPADM

## 2023-10-25 RX ORDER — SODIUM CHLORIDE 0.9 % (FLUSH) 0.9 %
5-40 SYRINGE (ML) INJECTION PRN
Status: DISCONTINUED | OUTPATIENT
Start: 2023-10-25 | End: 2023-10-25 | Stop reason: HOSPADM

## 2023-10-25 RX ADMIN — PROPOFOL 30 MG: 10 INJECTION, EMULSION INTRAVENOUS at 15:15

## 2023-10-25 RX ADMIN — SODIUM CHLORIDE, POTASSIUM CHLORIDE, SODIUM LACTATE AND CALCIUM CHLORIDE: 600; 310; 30; 20 INJECTION, SOLUTION INTRAVENOUS at 12:37

## 2023-10-25 RX ADMIN — PROPOFOL 100 MG: 10 INJECTION, EMULSION INTRAVENOUS at 15:07

## 2023-10-25 RX ADMIN — PROPOFOL 50 MG: 10 INJECTION, EMULSION INTRAVENOUS at 15:14

## 2023-10-25 RX ADMIN — SODIUM CHLORIDE, POTASSIUM CHLORIDE, SODIUM LACTATE AND CALCIUM CHLORIDE: 600; 310; 30; 20 INJECTION, SOLUTION INTRAVENOUS at 14:45

## 2023-10-25 RX ADMIN — PROPOFOL 50 MG: 10 INJECTION, EMULSION INTRAVENOUS at 15:12

## 2023-10-25 RX ADMIN — PROPOFOL 50 MG: 10 INJECTION, EMULSION INTRAVENOUS at 15:10

## 2023-10-25 RX ADMIN — PROPOFOL 50 MG: 10 INJECTION, EMULSION INTRAVENOUS at 15:09

## 2023-10-25 RX ADMIN — PROPOFOL 50 MG: 10 INJECTION, EMULSION INTRAVENOUS at 15:19

## 2023-10-25 RX ADMIN — PROPOFOL 30 MG: 10 INJECTION, EMULSION INTRAVENOUS at 15:21

## 2023-10-25 ASSESSMENT — PAIN - FUNCTIONAL ASSESSMENT: PAIN_FUNCTIONAL_ASSESSMENT: NONE - DENIES PAIN

## 2023-10-25 NOTE — ANESTHESIA POSTPROCEDURE EVALUATION
Department of Anesthesiology  Postprocedure Note    Patient: Verner Baar  MRN: [id number]  YOB: 1960  Date of evaluation: 10/25/2023      Procedure Summary     Date: 10/25/23 Room / Location: [facility] ENDO 01 / [facility] ENDOSCOPY    Anesthesia Start: 1452 Anesthesia Stop: 0765    Procedure: COLONOSCOPY with polypectomy (Rectum) Diagnosis:       Special screening for malignant neoplasms, colon      (Special screening for malignant neoplasms, colon [Z12.11])    Surgeons: Justice Carreno MD Responsible Provider: CHRIS Encarnacion CRNA    Anesthesia Type: MAC ASA Status: 3          Anesthesia Type: MAC    Miguel Phase I: Miguel Score: 10    Miguel Phase II:        Anesthesia Post Evaluation    Patient location during evaluation: bedside  Patient participation: complete - patient cannot participate  Level of consciousness: sleepy but conscious  Pain score: 0  Airway patency: patent  Nausea & Vomiting: no nausea and no vomiting  Complications: no  Cardiovascular status: blood pressure returned to baseline  Respiratory status: acceptable  Hydration status: euvolemic  Pain management: adequate

## 2023-10-25 NOTE — INTERVAL H&P NOTE
Update History & Physical    The patient's History and Physical of October 25, 2023 was reviewed with the patient and I examined the patient. There was no change. The surgical site was confirmed by the patient and me. Plan: The risks, benefits, expected outcome, and alternative to the recommended procedure have been discussed with the patient. Patient understands and wants to proceed with the procedure.      Electronically signed by Zheng Jackson MD on 10/25/2023 at 12:33 PM

## 2023-10-25 NOTE — OP NOTE
orifice and ileocecal valve. There was several nonbleeding AVMs in the cecum. Terminal ileum: not evaluated     Specimens: 1. Multiple 15-20 diminutive polyps removed from the rectosigmoid colon. Bowel preparation- adequate to detect small (5mm) polyps or larger. Estimated blood loss: none   Complications:  none   Cecal withdrawal time: 13 minutes. Comments: 10/18/2023 EGD revealed:  Impression:  1. Erosive gastritis. 2.  Erosive duodenitis and 6 small benign-appearing duodenal ulcers. 3.  The above is significant enough to cause the patient's anemia over time. I suspect this is from his daily ibuprofen use. Already on pantoprazole 40 mg twice a day. Impression:  Removal of multiple 15-20 diminutive polyps in the rectosigmoid that are suspicious for hyperplastic polyps. Diverticulosis. Nonbleeding AVMs in the cecum. The AVMs plus the findings on EGD as listed above are the likely cause of the patient's anemia. Recommendations:  Check pathology. Will notify patient with results when they are available. Repeat colonoscopy in 5-10 years for surveillance versus screening based on pathology results. .  Follow up as 12 weeks as scheduled. Continue pantoprazole 40 mg twice a day. At his 12-week follow-up, we plan to reschedule him for repeat EGD as well as biopsies to exclude H. pylori to assess healing of his duodenal ulcers. Continue to avoid nonsteroidal anti-inflammatory drugs which likely was the cause of his upper GI findings.

## 2023-10-25 NOTE — DISCHARGE INSTRUCTIONS
Impression:  Removal of multiple 15-20 diminutive polyps in the rectosigmoid that are suspicious for hyperplastic polyps. Diverticulosis. Nonbleeding AVMs in the cecum. The AVMs plus the findings on EGD as listed above are the likely cause of the patient's anemia. Recommendations:  Check pathology. Will notify patient with results when they are available. Repeat colonoscopy in 5-10 years for surveillance versus screening based on pathology results. .  Follow up as 12 weeks as scheduled. Continue pantoprazole 40 mg twice a day. At his 12-week follow-up, we plan to reschedule him for repeat EGD as well as biopsies to exclude H. pylori to assess healing of his duodenal ulcers. Continue to avoid nonsteroidal anti-inflammatory drugs which likely was the cause of his upper GI findings.

## 2023-10-28 LAB
BACTERIA SPEC CULT: NORMAL
BACTERIA SPEC CULT: NORMAL
Lab: NORMAL
Lab: NORMAL

## 2023-11-06 ENCOUNTER — HOSPITAL ENCOUNTER (EMERGENCY)
Age: 63
Discharge: HOME | End: 2023-11-06
Attending: EMERGENCY MEDICINE
Payer: MEDICARE

## 2023-11-06 ENCOUNTER — APPOINTMENT (OUTPATIENT)
Age: 63
End: 2023-11-06
Payer: MEDICARE

## 2023-11-06 VITALS
BODY MASS INDEX: 34.53 KG/M2 | HEIGHT: 67 IN | WEIGHT: 220 LBS | HEART RATE: 81 BPM | RESPIRATION RATE: 15 BRPM | TEMPERATURE: 97.7 F | SYSTOLIC BLOOD PRESSURE: 169 MMHG | DIASTOLIC BLOOD PRESSURE: 87 MMHG | OXYGEN SATURATION: 100 %

## 2023-11-06 DIAGNOSIS — K21.9 GASTROESOPHAGEAL REFLUX DISEASE WITHOUT ESOPHAGITIS: ICD-10-CM

## 2023-11-06 DIAGNOSIS — R07.9 CHEST PAIN, UNSPECIFIED TYPE: Primary | ICD-10-CM

## 2023-11-06 LAB
ALBUMIN SERPL-MCNC: 2.4 G/DL (ref 3.4–5)
ALBUMIN/GLOB SERPL: 0.5 (ref 0.8–1.7)
ALP SERPL-CCNC: 101 U/L (ref 45–117)
ALT SERPL-CCNC: 17 U/L (ref 16–61)
ANION GAP SERPL CALC-SCNC: 6 MMOL/L (ref 3–18)
AST SERPL W P-5'-P-CCNC: 24 U/L (ref 10–38)
BASOPHILS # BLD: 0.1 K/UL (ref 0–0.1)
BASOPHILS NFR BLD: 1 % (ref 0–2)
BILIRUB SERPL-MCNC: 0.2 MG/DL (ref 0.2–1)
BUN SERPL-MCNC: 10 MG/DL (ref 7–18)
BUN/CREAT SERPL: 12 (ref 12–20)
CA-I BLD-MCNC: 9.3 MG/DL (ref 8.5–10.1)
CHLORIDE SERPL-SCNC: 106 MMOL/L (ref 100–111)
CO2 SERPL-SCNC: 29 MMOL/L (ref 21–32)
CREAT SERPL-MCNC: 0.86 MG/DL (ref 0.6–1.3)
DIFFERENTIAL METHOD BLD: ABNORMAL
EKG ATRIAL RATE: 93 BPM
EKG DIAGNOSIS: NORMAL
EKG P AXIS: 79 DEGREES
EKG P-R INTERVAL: 130 MS
EKG Q-T INTERVAL: 374 MS
EKG QRS DURATION: 74 MS
EKG QTC CALCULATION (BAZETT): 465 MS
EKG R AXIS: 0 DEGREES
EKG T AXIS: 84 DEGREES
EKG VENTRICULAR RATE: 93 BPM
EOSINOPHIL # BLD: 0.1 K/UL (ref 0–0.4)
EOSINOPHIL NFR BLD: 1 % (ref 0–5)
ERYTHROCYTE [DISTWIDTH] IN BLOOD BY AUTOMATED COUNT: 22.4 % (ref 11.6–14.5)
GLOBULIN SER CALC-MCNC: 5.3 G/DL (ref 2–4)
GLUCOSE SERPL-MCNC: 162 MG/DL (ref 74–99)
HCT VFR BLD AUTO: 32.5 % (ref 36–48)
HGB BLD-MCNC: 9.5 G/DL (ref 13–16)
IMM GRANULOCYTES # BLD AUTO: 0.1 K/UL (ref 0–0.04)
IMM GRANULOCYTES NFR BLD AUTO: 1 % (ref 0–0.5)
LIPASE SERPL-CCNC: 32 U/L (ref 13–75)
LYMPHOCYTES # BLD: 1.5 K/UL (ref 0.9–3.6)
LYMPHOCYTES NFR BLD: 13 % (ref 21–52)
MCH RBC QN AUTO: 21.9 PG (ref 24–34)
MCHC RBC AUTO-ENTMCNC: 29.2 G/DL (ref 31–37)
MCV RBC AUTO: 75.1 FL (ref 78–100)
MONOCYTES # BLD: 1.5 K/UL (ref 0.05–1.2)
MONOCYTES NFR BLD: 13 % (ref 3–10)
NEUTS SEG # BLD: 8.5 K/UL (ref 1.8–8)
NEUTS SEG NFR BLD: 71 % (ref 40–73)
NRBC # BLD: 0 K/UL (ref 0–0.01)
NRBC BLD-RTO: 0 PER 100 WBC
PLATELET # BLD AUTO: 671 K/UL (ref 135–420)
PMV BLD AUTO: 9.4 FL (ref 9.2–11.8)
POTASSIUM SERPL-SCNC: 3.9 MMOL/L (ref 3.5–5.5)
PROT SERPL-MCNC: 7.7 G/DL (ref 6.4–8.2)
RBC # BLD AUTO: 4.33 M/UL (ref 4.35–5.65)
SODIUM SERPL-SCNC: 141 MMOL/L (ref 136–145)
TROPONIN I SERPL HS-MCNC: 21 NG/L (ref 0–78)
TROPONIN I SERPL HS-MCNC: 23 NG/L (ref 0–78)
WBC # BLD AUTO: 11.6 K/UL (ref 4.6–13.2)

## 2023-11-06 PROCEDURE — 96361 HYDRATE IV INFUSION ADD-ON: CPT

## 2023-11-06 PROCEDURE — 6360000002 HC RX W HCPCS: Performed by: EMERGENCY MEDICINE

## 2023-11-06 PROCEDURE — 2580000003 HC RX 258: Performed by: EMERGENCY MEDICINE

## 2023-11-06 PROCEDURE — 96374 THER/PROPH/DIAG INJ IV PUSH: CPT

## 2023-11-06 PROCEDURE — 6370000000 HC RX 637 (ALT 250 FOR IP): Performed by: EMERGENCY MEDICINE

## 2023-11-06 PROCEDURE — 84484 ASSAY OF TROPONIN QUANT: CPT

## 2023-11-06 PROCEDURE — 93005 ELECTROCARDIOGRAM TRACING: CPT | Performed by: EMERGENCY MEDICINE

## 2023-11-06 PROCEDURE — 80053 COMPREHEN METABOLIC PANEL: CPT

## 2023-11-06 PROCEDURE — 99285 EMERGENCY DEPT VISIT HI MDM: CPT

## 2023-11-06 PROCEDURE — 83690 ASSAY OF LIPASE: CPT

## 2023-11-06 PROCEDURE — 71045 X-RAY EXAM CHEST 1 VIEW: CPT

## 2023-11-06 PROCEDURE — 85025 COMPLETE CBC W/AUTO DIFF WBC: CPT

## 2023-11-06 PROCEDURE — 73110 X-RAY EXAM OF WRIST: CPT

## 2023-11-06 RX ORDER — SODIUM CHLORIDE, SODIUM LACTATE, POTASSIUM CHLORIDE, AND CALCIUM CHLORIDE .6; .31; .03; .02 G/100ML; G/100ML; G/100ML; G/100ML
1000 INJECTION, SOLUTION INTRAVENOUS ONCE
Status: COMPLETED | OUTPATIENT
Start: 2023-11-06 | End: 2023-11-06

## 2023-11-06 RX ORDER — KETOROLAC TROMETHAMINE 30 MG/ML
15 INJECTION, SOLUTION INTRAMUSCULAR; INTRAVENOUS ONCE
Status: COMPLETED | OUTPATIENT
Start: 2023-11-06 | End: 2023-11-06

## 2023-11-06 RX ORDER — MAGNESIUM HYDROXIDE/ALUMINUM HYDROXICE/SIMETHICONE 120; 1200; 1200 MG/30ML; MG/30ML; MG/30ML
30 SUSPENSION ORAL
Status: COMPLETED | OUTPATIENT
Start: 2023-11-06 | End: 2023-11-06

## 2023-11-06 RX ADMIN — SODIUM CHLORIDE, POTASSIUM CHLORIDE, SODIUM LACTATE AND CALCIUM CHLORIDE 1000 ML: 600; 310; 30; 20 INJECTION, SOLUTION INTRAVENOUS at 10:38

## 2023-11-06 RX ADMIN — KETOROLAC TROMETHAMINE 15 MG: 30 INJECTION, SOLUTION INTRAMUSCULAR at 10:39

## 2023-11-06 RX ADMIN — ALUMINUM HYDROXIDE, MAGNESIUM HYDROXIDE, AND SIMETHICONE 30 ML: 200; 200; 20 SUSPENSION ORAL at 10:39

## 2023-11-06 ASSESSMENT — PAIN DESCRIPTION - ORIENTATION: ORIENTATION: MID

## 2023-11-06 ASSESSMENT — PAIN DESCRIPTION - DESCRIPTORS: DESCRIPTORS: SHARP

## 2023-11-06 ASSESSMENT — PAIN DESCRIPTION - LOCATION: LOCATION: CHEST

## 2023-11-06 ASSESSMENT — LIFESTYLE VARIABLES
HOW OFTEN DO YOU HAVE A DRINK CONTAINING ALCOHOL: 2-4 TIMES A MONTH
HOW MANY STANDARD DRINKS CONTAINING ALCOHOL DO YOU HAVE ON A TYPICAL DAY: 1 OR 2

## 2023-11-06 ASSESSMENT — PAIN SCALES - GENERAL: PAINLEVEL_OUTOF10: 8

## 2023-11-06 ASSESSMENT — PAIN - FUNCTIONAL ASSESSMENT: PAIN_FUNCTIONAL_ASSESSMENT: 0-10

## 2023-11-06 NOTE — DISCHARGE INSTRUCTIONS
Your chest pain is very likely due to the gastritis that was noted on the endoscopy on October 25. Take the Protonix as prescribed. You can use Maalox, Tums or Rolaids as needed for any flares of the indigestion/gastritis. You had blood test for your heart that were reassuring. You may want to consider following up with your cardiologist after today's visit.

## 2023-11-06 NOTE — ED TRIAGE NOTES
Pt states that he started having pain in his chest last pm and then he noticed his \"breathing was funny\"

## 2023-11-06 NOTE — ED PROVIDER NOTES
[facility] EMERGENCY DEPT  EMERGENCY DEPARTMENT ENCOUNTER      Pt Name: Trell Gorman  MRN: [id number]  [address] [facility] 1960  Date of evaluation: 11/6/2023  Provider: Ronan Golden MD    CHIEF COMPLAINT       Chief Complaint   Patient presents with    Chest Pain         HISTORY OF PRESENT ILLNESS   (Location/Symptom, Timing/Onset, Context/Setting, Quality, Duration, Modifying Factors, Severity)  Note limiting factors. Trell Gorman is an obese 61 y.o. male with past medical history significant for type 2 diabetes, hypertension who presents to the emergency department for delayed evaluation of epigastric abdominal pain for the last 2 days. Gradual onset, atraumatic, nonradiating. No alleviating factors attempted. No clear aggravating factors, exertional or positional association, systemic symptoms, food or drug association. No change to the character or severity. Incidentally he has some gradual onset, atraumatic left wrist pain for the last several days for which he cannot recall inciting trauma. Aggravated by palpation and movement. No alleviating factors attempted. The history is provided by the patient and medical records. Nursing Notes were reviewed. REVIEW OF SYSTEMS    (2-9 systems for level 4, 10 or more for level 5)     Review of Systems   All other systems reviewed and are negative. Except as noted above the remainder of the review of systems was reviewed and negative.        PAST MEDICAL HISTORY     Past Medical History:   Diagnosis Date    Acute respiratory failure with hypoxia ([address])     COVID-19 10/2022    Diabetes mellitus ([address])     Hypertension     Hypertensive urgency     Pulmonary nodules     Sepsis ([address])          SURGICAL HISTORY       Past Surgical History:   Procedure Laterality Date    BACK SURGERY      CHOLECYSTECTOMY      COLONOSCOPY N/A 10/25/2023    COLONOSCOPY with polypectomy performed by Corina Rae MD at [facility] ENDOSCOPY    UPPER GASTROINTESTINAL

## 2024-01-16 ENCOUNTER — HOSPITAL ENCOUNTER (OUTPATIENT)
Age: 64
Setting detail: RECURRING SERIES
Discharge: HOME | End: 2024-01-19
Payer: MEDICARE

## 2024-01-16 PROCEDURE — 97166 OT EVAL MOD COMPLEX 45 MIN: CPT

## 2024-01-16 PROCEDURE — 97110 THERAPEUTIC EXERCISES: CPT

## 2024-01-16 NOTE — PROGRESS NOTES
Training  [x]               Patient Education                   [x]      Family Training/Education  []               Other (comment):    Frequency/Duration: Patient will be followed by occupational therapy 2x/ wk x 8 wks  to address goals.    Further Equipment Recommendations for Discharge: none      SUBJECTIVE:   Patient stated “I'm ready to get this arm and hand moving better.”    OBJECTIVE DATA SUMMARY:     Past Medical History:   Diagnosis Date    Acute respiratory failure with hypoxia (H[facility])     COVID-19 10/2022    Diabetes mellitus ([facility])     Hypertension     Hypertensive urgency     Pulmonary nodules     Sepsis ([facility])      Past Surgical History:   Procedure Laterality Date    BACK SURGERY      CHOLECYSTECTOMY      COLONOSCOPY N/A 10/25/2023    COLONOSCOPY with polypectomy performed by Albin Garcia MD at [facility] ENDOSCOPY    UPPER GASTROINTESTINAL ENDOSCOPY N/A 10/18/2023    EGD (ICU 5) performed by Albin Garcia MD at [facility] ENDOSCOPY     Barriers to Learning/Limitations: None  Compensate with: visual, verbal, tactile, kinesthetic cues/model    [x]  Right hand dominant   []  Left hand dominant    Cognitive/Behavioral Status:  A &  O x 3; follows multi step commands       Skin: intact   Edema: none noted       Vision/Perceptual:    Wfls     Coordination: BUE                WFL          Impaired    Gross Motor x    Fine Motor  x    Crossing the Midline  x    Bilateral Integration  x    Praxis x    Dexterity x    Visual Motor x        Strength: BUE                                                   3pt pinch            2pt pInch              Lateral pinch  Right 78# 12# 6.25# 16#   Left 10# 3# 3#  5.5#         Tone & Sensation: BUE  None       Range of Motion: BUE     RUE   AROM R UE PROM *Goal L UE AROM L UE   PROM *Goal   Shoulder flexion          Shoulder abduction          Elbow flexion          Elbow extension          Forearm supination          Forearm pronation          Wrist flexion     45 57

## 2024-01-16 NOTE — PROGRESS NOTES
interest, return verbalization , and return demonstration     Persons(s) to be included in education:  pt     Barriers to Learning/Limitations: None    Patient Goal (s): “I want to get this hand working”    Patient Self Reported Health Status: good    Rehabilitation Potential: good    Short Term Goals: To be accomplished in 3 weeks:  1- Pt will increase L hand  from 10# to 25# to increase function   2- Pt will increase L wrist flexion from 45 to 55 to increase function   3- Pt will increase L wrist extension from 30 to 45 to increase function   4- Pt will increase L 3pt pinch from 3# to 8#   5- Pt will increase L lateral pinch from 5.5# to 10# to increase function     Long Term Goals: To be accomplished in 8 weeks:   1- Pt will return to PLOF with L hand painfree function     Frequency / Duration: Patient to be seen 2 times per week for 8 weeks:    Patient/ Caregiver education and instruction: Diagnosis, prognosis, exercises       Mitzi Cuello MS, OTR/L   CERT Dates: 1/16/2024- 3/15/2024  ===================================================================================================================================================================================================================================================================================================  I certify that the above Occupational Therapy services are being furnished while this patient is under my care. I agree with the treatment plan and certify that this therapy is necessary.        Physician Signature: ___________________________________________     Date:________________________________             Time: ______________________________  ________________________________________________________________________

## 2024-01-19 ENCOUNTER — HOSPITAL ENCOUNTER (OUTPATIENT)
Age: 64
Setting detail: RECURRING SERIES
Discharge: HOME | End: 2024-01-22
Payer: MEDICARE

## 2024-01-19 PROCEDURE — 97110 THERAPEUTIC EXERCISES: CPT

## 2024-01-19 NOTE — PROGRESS NOTES
OT DAILY TREATMENT NOTE     Patient Name: iMlo Dolan  Date:2024  : 1960  [x]  Patient  Verified  Payor: [facility] MEDICARE / Plan: [facility] [employer] MEDIBLUE PLUS / Product Type: *No Product type* /    In time:100  Out time:147  Total Treatment Time (min): 47  Visit #: 2     Treatment Area: Effusion, left hand [M25.442]  Stiffness of left hand, not elsewhere classified [M25.642]    SUBJECTIVE  Pre- tx Pain Level (0-10 scale): 3/10  Post- tx pain level (0/10 scale)3/10  Any medication changes, allergies to medications, adverse drug reactions, diagnosis change, or new procedure performed?: [x] No    [] Yes (see summary sheet for update)  Subjective functional status/changes:   [] No changes reported  Pt reporting working on exercises in home environment     OBJECTIVE      47 min Therapeutic Exercise:  [] See flow sheet :   Rationale: increase ROM, increase strength, and improve coordination to improve the patient’s ability to complete gross and fine motor function in L hand   L wrist AROM ex x 10 reps, 2 sets for flexion/ extension, RD/ sup   1# supinator x 10 reps, 2 sets   Yellow digiflex x 10 reps, 5 sets   Red flexbar x 10 reps, 2 sets   Digit opposition x 10 reps, 2 sets   Digit ab/ adduction x 10 reps, 2 sets         With   [x] TE   [] TA   [] neuro   [] other: Patient Education: [x] Review HEP    [x] Progressed/Changed HEP based on:   [x] positioning   [x] body mechanics   [] transfers   [] heat/ice application   [] Splint wear/care   [] Sensory re-education   [] scar management      [] other:        ASSESSMENT  Progress towards goals/ change in function: pt making good improvements with L hand ROM and strengthening. Cont     Patient will continue to benefit from skilled OT services to address ROM deficits and address strength deficits to attain remaining goals.     [x]  See Plan of Care  []  See progress note/recertification  []  See Discharge Summary         PLAN  [x]  Upgrade

## 2024-01-24 ENCOUNTER — HOSPITAL ENCOUNTER (OUTPATIENT)
Age: 64
Setting detail: RECURRING SERIES
Discharge: HOME | End: 2024-01-27
Payer: MEDICARE

## 2024-01-24 PROCEDURE — 97110 THERAPEUTIC EXERCISES: CPT

## 2024-01-24 NOTE — PROGRESS NOTES
OT DAILY TREATMENT NOTE     Patient Name: Milo Dolan  Date:2024  : 1960  [x]  Patient  Verified  Payor: [facility] MEDICARE / Plan: [facility]IR[facility] [employer]ALTH[employer]EEPERS MEDIBLUE PLUS / Product Type: *No Product type* /    In time:0200  Out time:0246  Total Treatment Time (min): 46  Visit #: 3    Treatment Area: Effusion, left hand [M25.442]  Stiffness of left hand, not elsewhere classified [M25.642]    SUBJECTIVE  Pre- tx Pain Level (0-10 scale): 2/10  Post- tx pain level (0/10 scale)1-2/10  Any medication changes, allergies to medications, adverse drug reactions, diagnosis change, or new procedure performed?: [x] No    [] Yes (see summary sheet for update)  Subjective functional status/changes:   [] No changes reported  Pt reports that he is doing more in home with R hand    OBJECTIVE      46 min Therapeutic Exercise:  [] See flow sheet :   Rationale: increase ROM, increase strength, and improve coordination to improve the patient’s ability to complete gross and fine motor coordination for R hand  R hand AROM wrist flexion/ extension, RD/ UD, supination x 10 reps,2 sets   1# supinator x 10 reps, 2 sets   Red digiflex x 10 reps, 4 sets   Red flexbar x 10 reps, 2 sets   Digit opposition x 10 reps,   Digit ab/ adduction x 10 reps   Provided pt yellow putty with pt able to return demonstration           With   [x] TE   [] TA   [] neuro   [] other: Patient Education: [x] Review HEP    [x] Progressed/Changed HEP based on:   [x] positioning   [x] body mechanics   [] transfers   [] heat/ice application   [] Splint wear/care   [] Sensory re-education   [] scar management      [] other:        ASSESSMENT  Progress towards goals/ change in function: pt making good progress with pain free motion. Cont     Patient will continue to benefit from skilled OT services to address ROM deficits and address strength deficits to attain remaining goals.     [x]  See Plan of Care  []  See progress note/recertification  []  See

## 2024-01-29 ENCOUNTER — HOSPITAL ENCOUNTER (OUTPATIENT)
Age: 64
Setting detail: RECURRING SERIES
Discharge: HOME | End: 2024-02-01
Payer: MEDICARE

## 2024-01-29 PROCEDURE — 97110 THERAPEUTIC EXERCISES: CPT

## 2024-01-29 NOTE — PROGRESS NOTES
to:_  []  Other:_      Mitzi Cuello, MS, OTR/L   1/29/2024  2:53 PM    Future Appointments   Date Time Provider Department Center   2/1/2024  1:00 PM Mitzi Cuello, OT [facility]

## 2024-04-29 ENCOUNTER — HOSPITAL ENCOUNTER (OUTPATIENT)
Age: 64
Discharge: HOME | End: 2024-05-02

## 2024-04-29 LAB — LABCORP DRAW FEE: NORMAL

## 2024-04-29 PROCEDURE — 99001 SPECIMEN HANDLING PT-LAB: CPT

## 2024-06-20 ENCOUNTER — HOSPITAL ENCOUNTER (OUTPATIENT)
Facility: HOSPITAL | Age: 64
Discharge: HOME | End: 2024-06-20
Attending: INTERNAL MEDICINE
Payer: MEDICARE

## 2024-06-20 DIAGNOSIS — R91.1 LUNG NODULE: ICD-10-CM

## 2024-06-20 PROCEDURE — 71250 CT THORAX DX C-: CPT

## (undated) DEVICE — Device: Brand: DEFENDO VALVE AND CONNECTOR KIT

## (undated) DEVICE — TUBING, SUCTION, 9/32" X 10', STRAIGHT: Brand: MEDLINE

## (undated) DEVICE — KIT COLON W/ 1.1OZ LUB AND 2 END

## (undated) DEVICE — SOLUTION IRRIG 1000ML STRL H2O USP PLAS POUR BTL

## (undated) DEVICE — ENDOGATOR TUBING FOR BOSTON SCIENTIFIC ENDOSTAT II PUMP, OLYMPUS OFP PUMP OR ENDO STRATUS PUMP: Brand: ENDOGATOR

## (undated) DEVICE — SOLUTION IRRIG 500ML STRL H2O NONPYROGENIC

## (undated) DEVICE — TUBING INSUFFLATION CAP W/ EXT CARBON DIOX ENDO SMARTCAP

## (undated) DEVICE — SINGLE-USE BIOPSY FORCEPS: Brand: RADIAL JAW 4

## (undated) DEVICE — CONMED SCOPE SAVER BITE BLOCK, 20X27 MM: Brand: SCOPE SAVER